# Patient Record
Sex: MALE | Race: WHITE | Employment: OTHER | URBAN - METROPOLITAN AREA
[De-identification: names, ages, dates, MRNs, and addresses within clinical notes are randomized per-mention and may not be internally consistent; named-entity substitution may affect disease eponyms.]

---

## 2018-05-13 ENCOUNTER — APPOINTMENT (OUTPATIENT)
Dept: GENERAL RADIOLOGY | Age: 83
DRG: 470 | End: 2018-05-13
Payer: MEDICARE

## 2018-05-13 ENCOUNTER — HOSPITAL ENCOUNTER (INPATIENT)
Age: 83
LOS: 3 days | Discharge: NURSING, REHAB OR LONG-TERM CARE | DRG: 470 | End: 2018-05-16
Attending: EMERGENCY MEDICINE | Admitting: INTERNAL MEDICINE
Payer: MEDICARE

## 2018-05-13 DIAGNOSIS — S72.002A CLOSED LEFT HIP FRACTURE, INITIAL ENCOUNTER (HCC): Primary | ICD-10-CM

## 2018-05-13 DIAGNOSIS — R52 PAIN: ICD-10-CM

## 2018-05-13 PROBLEM — E78.2 MIXED HYPERLIPIDEMIA: Chronic | Status: ACTIVE | Noted: 2018-05-13

## 2018-05-13 PROBLEM — J44.9 COPD (CHRONIC OBSTRUCTIVE PULMONARY DISEASE) (HCC): Chronic | Status: ACTIVE | Noted: 2018-05-13

## 2018-05-13 PROBLEM — I10 HTN (HYPERTENSION), BENIGN: Chronic | Status: ACTIVE | Noted: 2018-05-13

## 2018-05-13 LAB
ABO/RH: NORMAL
ANION GAP SERPL CALCULATED.3IONS-SCNC: 11 MMOL/L (ref 7–16)
ANTIBODY SCREEN: NORMAL
APTT: 32.7 SEC (ref 24.5–35.1)
BASOPHILS ABSOLUTE: 0.05 E9/L (ref 0–0.2)
BASOPHILS RELATIVE PERCENT: 0.5 % (ref 0–2)
BUN BLDV-MCNC: 11 MG/DL (ref 8–23)
CALCIUM SERPL-MCNC: 9 MG/DL (ref 8.6–10.2)
CHLORIDE BLD-SCNC: 96 MMOL/L (ref 98–107)
CO2: 24 MMOL/L (ref 22–29)
CREAT SERPL-MCNC: 0.5 MG/DL (ref 0.7–1.2)
EKG ATRIAL RATE: 108 BPM
EKG P AXIS: 46 DEGREES
EKG P-R INTERVAL: 200 MS
EKG Q-T INTERVAL: 336 MS
EKG QRS DURATION: 128 MS
EKG QTC CALCULATION (BAZETT): 450 MS
EKG R AXIS: -45 DEGREES
EKG T AXIS: -32 DEGREES
EKG VENTRICULAR RATE: 108 BPM
EOSINOPHILS ABSOLUTE: 0.09 E9/L (ref 0.05–0.5)
EOSINOPHILS RELATIVE PERCENT: 0.9 % (ref 0–6)
GFR AFRICAN AMERICAN: >60
GFR NON-AFRICAN AMERICAN: >60 ML/MIN/1.73
GLUCOSE BLD-MCNC: 129 MG/DL (ref 74–109)
HCT VFR BLD CALC: 42.7 % (ref 37–54)
HEMOGLOBIN: 14.4 G/DL (ref 12.5–16.5)
IMMATURE GRANULOCYTES #: 0.04 E9/L
IMMATURE GRANULOCYTES %: 0.4 % (ref 0–5)
INR BLD: 1.3
LYMPHOCYTES ABSOLUTE: 0.95 E9/L (ref 1.5–4)
LYMPHOCYTES RELATIVE PERCENT: 9.8 % (ref 20–42)
MCH RBC QN AUTO: 35.1 PG (ref 26–35)
MCHC RBC AUTO-ENTMCNC: 33.7 % (ref 32–34.5)
MCV RBC AUTO: 104.1 FL (ref 80–99.9)
MONOCYTES ABSOLUTE: 1.01 E9/L (ref 0.1–0.95)
MONOCYTES RELATIVE PERCENT: 10.4 % (ref 2–12)
NEUTROPHILS ABSOLUTE: 7.54 E9/L (ref 1.8–7.3)
NEUTROPHILS RELATIVE PERCENT: 78 % (ref 43–80)
PDW BLD-RTO: 12.9 FL (ref 11.5–15)
PLATELET # BLD: 172 E9/L (ref 130–450)
PMV BLD AUTO: 10.3 FL (ref 7–12)
POTASSIUM SERPL-SCNC: 4.5 MMOL/L (ref 3.5–5)
PROTHROMBIN TIME: 14.5 SEC (ref 9.3–12.4)
RBC # BLD: 4.1 E12/L (ref 3.8–5.8)
SODIUM BLD-SCNC: 131 MMOL/L (ref 132–146)
WBC # BLD: 9.7 E9/L (ref 4.5–11.5)

## 2018-05-13 PROCEDURE — 93005 ELECTROCARDIOGRAM TRACING: CPT | Performed by: EMERGENCY MEDICINE

## 2018-05-13 PROCEDURE — 94640 AIRWAY INHALATION TREATMENT: CPT

## 2018-05-13 PROCEDURE — 85610 PROTHROMBIN TIME: CPT

## 2018-05-13 PROCEDURE — 73562 X-RAY EXAM OF KNEE 3: CPT

## 2018-05-13 PROCEDURE — 73610 X-RAY EXAM OF ANKLE: CPT

## 2018-05-13 PROCEDURE — 80048 BASIC METABOLIC PNL TOTAL CA: CPT

## 2018-05-13 PROCEDURE — 99285 EMERGENCY DEPT VISIT HI MDM: CPT

## 2018-05-13 PROCEDURE — 6370000000 HC RX 637 (ALT 250 FOR IP): Performed by: INTERNAL MEDICINE

## 2018-05-13 PROCEDURE — 1200000000 HC SEMI PRIVATE

## 2018-05-13 PROCEDURE — 71045 X-RAY EXAM CHEST 1 VIEW: CPT

## 2018-05-13 PROCEDURE — 36415 COLL VENOUS BLD VENIPUNCTURE: CPT

## 2018-05-13 PROCEDURE — 6360000002 HC RX W HCPCS: Performed by: INTERNAL MEDICINE

## 2018-05-13 PROCEDURE — 86901 BLOOD TYPING SEROLOGIC RH(D): CPT

## 2018-05-13 PROCEDURE — 6370000000 HC RX 637 (ALT 250 FOR IP): Performed by: EMERGENCY MEDICINE

## 2018-05-13 PROCEDURE — 73552 X-RAY EXAM OF FEMUR 2/>: CPT

## 2018-05-13 PROCEDURE — 73502 X-RAY EXAM HIP UNI 2-3 VIEWS: CPT

## 2018-05-13 PROCEDURE — 94664 DEMO&/EVAL PT USE INHALER: CPT

## 2018-05-13 PROCEDURE — 86900 BLOOD TYPING SEROLOGIC ABO: CPT

## 2018-05-13 PROCEDURE — 86850 RBC ANTIBODY SCREEN: CPT

## 2018-05-13 PROCEDURE — 2580000003 HC RX 258: Performed by: INTERNAL MEDICINE

## 2018-05-13 PROCEDURE — 85730 THROMBOPLASTIN TIME PARTIAL: CPT

## 2018-05-13 PROCEDURE — 85025 COMPLETE CBC W/AUTO DIFF WBC: CPT

## 2018-05-13 RX ORDER — SODIUM CHLORIDE 0.9 % (FLUSH) 0.9 %
10 SYRINGE (ML) INJECTION EVERY 12 HOURS SCHEDULED
Status: DISCONTINUED | OUTPATIENT
Start: 2018-05-13 | End: 2018-05-16 | Stop reason: HOSPADM

## 2018-05-13 RX ORDER — CLINDAMYCIN PHOSPHATE 900 MG/50ML
900 INJECTION INTRAVENOUS SEE ADMIN INSTRUCTIONS
Status: COMPLETED | OUTPATIENT
Start: 2018-05-13 | End: 2018-05-14

## 2018-05-13 RX ORDER — ESCITALOPRAM OXALATE 10 MG/1
10 TABLET ORAL DAILY
Status: DISCONTINUED | OUTPATIENT
Start: 2018-05-13 | End: 2018-05-16 | Stop reason: HOSPADM

## 2018-05-13 RX ORDER — ACETAMINOPHEN 325 MG/1
650 TABLET ORAL EVERY 4 HOURS PRN
Status: DISCONTINUED | OUTPATIENT
Start: 2018-05-13 | End: 2018-05-16 | Stop reason: HOSPADM

## 2018-05-13 RX ORDER — ACETAMINOPHEN 325 MG/1
650 TABLET ORAL EVERY 4 HOURS PRN
Status: DISCONTINUED | OUTPATIENT
Start: 2018-05-13 | End: 2018-05-13 | Stop reason: SDUPTHER

## 2018-05-13 RX ORDER — DOXYCYCLINE HYCLATE 100 MG/1
100 CAPSULE ORAL 2 TIMES DAILY
Status: DISCONTINUED | OUTPATIENT
Start: 2018-05-13 | End: 2018-05-16 | Stop reason: HOSPADM

## 2018-05-13 RX ORDER — ONDANSETRON 2 MG/ML
4 INJECTION INTRAMUSCULAR; INTRAVENOUS EVERY 6 HOURS PRN
Status: DISCONTINUED | OUTPATIENT
Start: 2018-05-13 | End: 2018-05-16 | Stop reason: HOSPADM

## 2018-05-13 RX ORDER — CLINDAMYCIN PHOSPHATE 900 MG/50ML
900 INJECTION INTRAVENOUS
Status: DISCONTINUED | OUTPATIENT
Start: 2018-05-13 | End: 2018-05-13 | Stop reason: CLARIF

## 2018-05-13 RX ORDER — LABETALOL HYDROCHLORIDE 5 MG/ML
10 INJECTION, SOLUTION INTRAVENOUS EVERY 4 HOURS PRN
Status: DISCONTINUED | OUTPATIENT
Start: 2018-05-13 | End: 2018-05-16 | Stop reason: HOSPADM

## 2018-05-13 RX ORDER — OXYCODONE HYDROCHLORIDE AND ACETAMINOPHEN 5; 325 MG/1; MG/1
1 TABLET ORAL EVERY 4 HOURS PRN
Status: DISCONTINUED | OUTPATIENT
Start: 2018-05-13 | End: 2018-05-16 | Stop reason: HOSPADM

## 2018-05-13 RX ORDER — FORMOTEROL FUMARATE 20 UG/2ML
20 SOLUTION RESPIRATORY (INHALATION) EVERY 12 HOURS
Status: DISCONTINUED | OUTPATIENT
Start: 2018-05-13 | End: 2018-05-16 | Stop reason: HOSPADM

## 2018-05-13 RX ORDER — BUDESONIDE 0.5 MG/2ML
500 INHALANT ORAL 2 TIMES DAILY
Status: DISCONTINUED | OUTPATIENT
Start: 2018-05-13 | End: 2018-05-16 | Stop reason: HOSPADM

## 2018-05-13 RX ORDER — IPRATROPIUM BROMIDE AND ALBUTEROL SULFATE 2.5; .5 MG/3ML; MG/3ML
1 SOLUTION RESPIRATORY (INHALATION) EVERY 4 HOURS PRN
Status: DISCONTINUED | OUTPATIENT
Start: 2018-05-13 | End: 2018-05-16 | Stop reason: HOSPADM

## 2018-05-13 RX ORDER — HYDROCODONE BITARTRATE AND ACETAMINOPHEN 5; 325 MG/1; MG/1
1 TABLET ORAL ONCE
Status: COMPLETED | OUTPATIENT
Start: 2018-05-13 | End: 2018-05-13

## 2018-05-13 RX ORDER — PREDNISONE 1 MG/1
2.5 TABLET ORAL DAILY
Status: DISCONTINUED | OUTPATIENT
Start: 2018-05-13 | End: 2018-05-16 | Stop reason: HOSPADM

## 2018-05-13 RX ORDER — SODIUM CHLORIDE 0.9 % (FLUSH) 0.9 %
10 SYRINGE (ML) INJECTION PRN
Status: DISCONTINUED | OUTPATIENT
Start: 2018-05-13 | End: 2018-05-16 | Stop reason: HOSPADM

## 2018-05-13 RX ADMIN — BUDESONIDE 500 MCG: 0.5 SUSPENSION RESPIRATORY (INHALATION) at 22:10

## 2018-05-13 RX ADMIN — DOXYCYCLINE HYCLATE 100 MG: 100 CAPSULE, GELATIN COATED ORAL at 21:38

## 2018-05-13 RX ADMIN — HYDROCODONE BITARTRATE AND ACETAMINOPHEN 1 TABLET: 5; 325 TABLET ORAL at 12:31

## 2018-05-13 RX ADMIN — FORMOTEROL FUMARATE DIHYDRATE 20 MCG: 20 SOLUTION RESPIRATORY (INHALATION) at 22:10

## 2018-05-13 RX ADMIN — Medication 10 ML: at 21:43

## 2018-05-13 ASSESSMENT — PAIN SCALES - GENERAL
PAINLEVEL_OUTOF10: 3
PAINLEVEL_OUTOF10: 0
PAINLEVEL_OUTOF10: 0

## 2018-05-14 ENCOUNTER — APPOINTMENT (OUTPATIENT)
Dept: GENERAL RADIOLOGY | Age: 83
DRG: 470 | End: 2018-05-14
Payer: MEDICARE

## 2018-05-14 ENCOUNTER — ANESTHESIA (OUTPATIENT)
Dept: OPERATING ROOM | Age: 83
DRG: 470 | End: 2018-05-14
Payer: MEDICARE

## 2018-05-14 ENCOUNTER — ANESTHESIA EVENT (OUTPATIENT)
Dept: OPERATING ROOM | Age: 83
DRG: 470 | End: 2018-05-14
Payer: MEDICARE

## 2018-05-14 VITALS
RESPIRATION RATE: 8 BRPM | SYSTOLIC BLOOD PRESSURE: 135 MMHG | TEMPERATURE: 98.8 F | DIASTOLIC BLOOD PRESSURE: 97 MMHG | OXYGEN SATURATION: 100 %

## 2018-05-14 PROBLEM — M86.652 CHRONIC OSTEOMYELITIS OF LEFT FEMUR (HCC): Chronic | Status: ACTIVE | Noted: 2018-05-14

## 2018-05-14 PROBLEM — S72.032A CLOSED DISPLACED MIDCERVICAL FRACTURE OF LEFT FEMUR (HCC): Status: ACTIVE | Noted: 2018-05-13

## 2018-05-14 PROBLEM — T84.9XXA COMPLICATION OF INTERNAL ORTHOPEDIC DEVICE, INITIAL ENCOUNTER (HCC): Status: ACTIVE | Noted: 2018-05-14

## 2018-05-14 LAB
ANION GAP SERPL CALCULATED.3IONS-SCNC: 11 MMOL/L (ref 7–16)
APTT: 31.4 SEC (ref 24.5–35.1)
BUN BLDV-MCNC: 15 MG/DL (ref 8–23)
CALCIUM SERPL-MCNC: 8.4 MG/DL (ref 8.6–10.2)
CHLORIDE BLD-SCNC: 97 MMOL/L (ref 98–107)
CO2: 22 MMOL/L (ref 22–29)
CREAT SERPL-MCNC: 0.6 MG/DL (ref 0.7–1.2)
GFR AFRICAN AMERICAN: >60
GFR NON-AFRICAN AMERICAN: >60 ML/MIN/1.73
GLUCOSE BLD-MCNC: 121 MG/DL (ref 74–109)
HCT VFR BLD CALC: 39.2 % (ref 37–54)
HEMOGLOBIN: 13.4 G/DL (ref 12.5–16.5)
INR BLD: 1.3
MCH RBC QN AUTO: 35.4 PG (ref 26–35)
MCHC RBC AUTO-ENTMCNC: 34.2 % (ref 32–34.5)
MCV RBC AUTO: 103.4 FL (ref 80–99.9)
PDW BLD-RTO: 13.2 FL (ref 11.5–15)
PLATELET # BLD: 153 E9/L (ref 130–450)
PMV BLD AUTO: 9.6 FL (ref 7–12)
POTASSIUM SERPL-SCNC: 4.3 MMOL/L (ref 3.5–5)
PROTHROMBIN TIME: 14.7 SEC (ref 9.3–12.4)
RBC # BLD: 3.79 E12/L (ref 3.8–5.8)
SODIUM BLD-SCNC: 130 MMOL/L (ref 132–146)
WBC # BLD: 7.5 E9/L (ref 4.5–11.5)

## 2018-05-14 PROCEDURE — 80048 BASIC METABOLIC PNL TOTAL CA: CPT

## 2018-05-14 PROCEDURE — 2500000003 HC RX 250 WO HCPCS: Performed by: NURSE ANESTHETIST, CERTIFIED REGISTERED

## 2018-05-14 PROCEDURE — 3209999900 FLUORO FOR SURGICAL PROCEDURES

## 2018-05-14 PROCEDURE — 2500000003 HC RX 250 WO HCPCS: Performed by: STUDENT IN AN ORGANIZED HEALTH CARE EDUCATION/TRAINING PROGRAM

## 2018-05-14 PROCEDURE — 6360000002 HC RX W HCPCS

## 2018-05-14 PROCEDURE — 2580000003 HC RX 258: Performed by: NURSE ANESTHETIST, CERTIFIED REGISTERED

## 2018-05-14 PROCEDURE — C1776 JOINT DEVICE (IMPLANTABLE): HCPCS | Performed by: ORTHOPAEDIC SURGERY

## 2018-05-14 PROCEDURE — 3600000005 HC SURGERY LEVEL 5 BASE: Performed by: ORTHOPAEDIC SURGERY

## 2018-05-14 PROCEDURE — 87077 CULTURE AEROBIC IDENTIFY: CPT

## 2018-05-14 PROCEDURE — 3700000000 HC ANESTHESIA ATTENDED CARE: Performed by: ORTHOPAEDIC SURGERY

## 2018-05-14 PROCEDURE — 87070 CULTURE OTHR SPECIMN AEROBIC: CPT

## 2018-05-14 PROCEDURE — 2500000003 HC RX 250 WO HCPCS

## 2018-05-14 PROCEDURE — 0SRS0JZ REPLACEMENT OF LEFT HIP JOINT, FEMORAL SURFACE WITH SYNTHETIC SUBSTITUTE, OPEN APPROACH: ICD-10-PCS | Performed by: ORTHOPAEDIC SURGERY

## 2018-05-14 PROCEDURE — 87015 SPECIMEN INFECT AGNT CONCNTJ: CPT

## 2018-05-14 PROCEDURE — 6360000002 HC RX W HCPCS: Performed by: NURSE ANESTHETIST, CERTIFIED REGISTERED

## 2018-05-14 PROCEDURE — 85610 PROTHROMBIN TIME: CPT

## 2018-05-14 PROCEDURE — 7100000001 HC PACU RECOVERY - ADDTL 15 MIN: Performed by: ORTHOPAEDIC SURGERY

## 2018-05-14 PROCEDURE — 94640 AIRWAY INHALATION TREATMENT: CPT

## 2018-05-14 PROCEDURE — 2580000003 HC RX 258: Performed by: INTERNAL MEDICINE

## 2018-05-14 PROCEDURE — 87075 CULTR BACTERIA EXCEPT BLOOD: CPT

## 2018-05-14 PROCEDURE — 0QBC0ZZ EXCISION OF LEFT LOWER FEMUR, OPEN APPROACH: ICD-10-PCS | Performed by: ORTHOPAEDIC SURGERY

## 2018-05-14 PROCEDURE — 85027 COMPLETE CBC AUTOMATED: CPT

## 2018-05-14 PROCEDURE — 94760 N-INVAS EAR/PLS OXIMETRY 1: CPT

## 2018-05-14 PROCEDURE — 6360000002 HC RX W HCPCS: Performed by: ORTHOPAEDIC SURGERY

## 2018-05-14 PROCEDURE — 85730 THROMBOPLASTIN TIME PARTIAL: CPT

## 2018-05-14 PROCEDURE — 88311 DECALCIFY TISSUE: CPT

## 2018-05-14 PROCEDURE — 7100000000 HC PACU RECOVERY - FIRST 15 MIN: Performed by: ORTHOPAEDIC SURGERY

## 2018-05-14 PROCEDURE — 2709999900 HC NON-CHARGEABLE SUPPLY: Performed by: ORTHOPAEDIC SURGERY

## 2018-05-14 PROCEDURE — 73502 X-RAY EXAM HIP UNI 2-3 VIEWS: CPT

## 2018-05-14 PROCEDURE — 3700000001 HC ADD 15 MINUTES (ANESTHESIA): Performed by: ORTHOPAEDIC SURGERY

## 2018-05-14 PROCEDURE — 87186 SC STD MICRODIL/AGAR DIL: CPT

## 2018-05-14 PROCEDURE — 36415 COLL VENOUS BLD VENIPUNCTURE: CPT

## 2018-05-14 PROCEDURE — 87116 MYCOBACTERIA CULTURE: CPT

## 2018-05-14 PROCEDURE — 1200000000 HC SEMI PRIVATE

## 2018-05-14 PROCEDURE — C1713 ANCHOR/SCREW BN/BN,TIS/BN: HCPCS | Performed by: ORTHOPAEDIC SURGERY

## 2018-05-14 PROCEDURE — 87206 SMEAR FLUORESCENT/ACID STAI: CPT

## 2018-05-14 PROCEDURE — 2700000000 HC OXYGEN THERAPY PER DAY

## 2018-05-14 PROCEDURE — 88304 TISSUE EXAM BY PATHOLOGIST: CPT

## 2018-05-14 PROCEDURE — 87102 FUNGUS ISOLATION CULTURE: CPT

## 2018-05-14 PROCEDURE — 3600000015 HC SURGERY LEVEL 5 ADDTL 15MIN: Performed by: ORTHOPAEDIC SURGERY

## 2018-05-14 PROCEDURE — 73552 X-RAY EXAM OF FEMUR 2/>: CPT

## 2018-05-14 PROCEDURE — 87205 SMEAR GRAM STAIN: CPT

## 2018-05-14 PROCEDURE — 0QP904Z REMOVAL OF INTERNAL FIXATION DEVICE FROM LEFT FEMORAL SHAFT, OPEN APPROACH: ICD-10-PCS | Performed by: ORTHOPAEDIC SURGERY

## 2018-05-14 DEVICE — DUP USE 125194 IMPL HIP FEM HEAD LFIT V40 26MM 3MM: Type: IMPLANTABLE DEVICE | Site: HIP | Status: FUNCTIONAL

## 2018-05-14 DEVICE — IMPLANTABLE DEVICE: Type: IMPLANTABLE DEVICE | Site: HIP | Status: FUNCTIONAL

## 2018-05-14 DEVICE — COMPONENT TOT HIP CAPPED STD FRAC: Type: IMPLANTABLE DEVICE | Site: HIP | Status: FUNCTIONAL

## 2018-05-14 DEVICE — RESORBABLE BEAD KIT - FAST CURE
Type: IMPLANTABLE DEVICE | Site: HIP | Status: FUNCTIONAL
Brand: OSTEOSET

## 2018-05-14 RX ORDER — PROPOFOL 10 MG/ML
INJECTION, EMULSION INTRAVENOUS PRN
Status: DISCONTINUED | OUTPATIENT
Start: 2018-05-14 | End: 2018-05-14 | Stop reason: SDUPTHER

## 2018-05-14 RX ORDER — NEOSTIGMINE METHYLSULFATE 1 MG/ML
INJECTION, SOLUTION INTRAVENOUS PRN
Status: DISCONTINUED | OUTPATIENT
Start: 2018-05-14 | End: 2018-05-14 | Stop reason: SDUPTHER

## 2018-05-14 RX ORDER — MORPHINE SULFATE 2 MG/ML
2 INJECTION, SOLUTION INTRAMUSCULAR; INTRAVENOUS EVERY 5 MIN PRN
Status: DISCONTINUED | OUTPATIENT
Start: 2018-05-14 | End: 2018-05-14 | Stop reason: HOSPADM

## 2018-05-14 RX ORDER — ASPIRIN 81 MG/1
81 TABLET ORAL DAILY
Qty: 25 TABLET | Refills: 0 | Status: ON HOLD | OUTPATIENT
Start: 2018-05-14 | End: 2020-04-22

## 2018-05-14 RX ORDER — OXYCODONE HYDROCHLORIDE AND ACETAMINOPHEN 5; 325 MG/1; MG/1
1 TABLET ORAL EVERY 6 HOURS PRN
Qty: 28 TABLET | Refills: 0 | Status: SHIPPED | OUTPATIENT
Start: 2018-05-14 | End: 2018-05-21

## 2018-05-14 RX ORDER — ASPIRIN 81 MG/1
81 TABLET ORAL DAILY
Status: DISCONTINUED | OUTPATIENT
Start: 2018-05-15 | End: 2018-05-16 | Stop reason: HOSPADM

## 2018-05-14 RX ORDER — DEXAMETHASONE SODIUM PHOSPHATE 4 MG/ML
INJECTION, SOLUTION INTRA-ARTICULAR; INTRALESIONAL; INTRAMUSCULAR; INTRAVENOUS; SOFT TISSUE PRN
Status: DISCONTINUED | OUTPATIENT
Start: 2018-05-14 | End: 2018-05-14 | Stop reason: SDUPTHER

## 2018-05-14 RX ORDER — ERGOCALCIFEROL 1.25 MG/1
50000 CAPSULE ORAL ONCE
Status: COMPLETED | OUTPATIENT
Start: 2018-05-14 | End: 2018-05-15

## 2018-05-14 RX ORDER — ONDANSETRON 2 MG/ML
INJECTION INTRAMUSCULAR; INTRAVENOUS PRN
Status: DISCONTINUED | OUTPATIENT
Start: 2018-05-14 | End: 2018-05-14 | Stop reason: SDUPTHER

## 2018-05-14 RX ORDER — FENTANYL CITRATE 50 UG/ML
INJECTION, SOLUTION INTRAMUSCULAR; INTRAVENOUS PRN
Status: DISCONTINUED | OUTPATIENT
Start: 2018-05-14 | End: 2018-05-14 | Stop reason: SDUPTHER

## 2018-05-14 RX ORDER — TOBRAMYCIN 1.2 G/30ML
1200 INJECTION, POWDER, LYOPHILIZED, FOR SOLUTION INTRAVENOUS ONCE
Status: COMPLETED | OUTPATIENT
Start: 2018-05-14 | End: 2018-05-14

## 2018-05-14 RX ORDER — MEPERIDINE HYDROCHLORIDE 50 MG/ML
12.5 INJECTION INTRAMUSCULAR; INTRAVENOUS; SUBCUTANEOUS EVERY 5 MIN PRN
Status: DISCONTINUED | OUTPATIENT
Start: 2018-05-14 | End: 2018-05-14 | Stop reason: HOSPADM

## 2018-05-14 RX ORDER — ROCURONIUM BROMIDE 10 MG/ML
INJECTION, SOLUTION INTRAVENOUS PRN
Status: DISCONTINUED | OUTPATIENT
Start: 2018-05-14 | End: 2018-05-14 | Stop reason: SDUPTHER

## 2018-05-14 RX ORDER — OXYCODONE HYDROCHLORIDE AND ACETAMINOPHEN 5; 325 MG/1; MG/1
0.5 TABLET ORAL EVERY 6 HOURS PRN
Status: DISCONTINUED | OUTPATIENT
Start: 2018-05-14 | End: 2018-05-16 | Stop reason: HOSPADM

## 2018-05-14 RX ORDER — MORPHINE SULFATE 4 MG/ML
4 INJECTION, SOLUTION INTRAMUSCULAR; INTRAVENOUS
Status: DISCONTINUED | OUTPATIENT
Start: 2018-05-14 | End: 2018-05-16 | Stop reason: HOSPADM

## 2018-05-14 RX ORDER — GLYCOPYRROLATE 0.2 MG/ML
INJECTION INTRAMUSCULAR; INTRAVENOUS PRN
Status: DISCONTINUED | OUTPATIENT
Start: 2018-05-14 | End: 2018-05-14 | Stop reason: SDUPTHER

## 2018-05-14 RX ORDER — LABETALOL HYDROCHLORIDE 5 MG/ML
5 INJECTION, SOLUTION INTRAVENOUS EVERY 10 MIN PRN
Status: DISCONTINUED | OUTPATIENT
Start: 2018-05-14 | End: 2018-05-14 | Stop reason: HOSPADM

## 2018-05-14 RX ORDER — HYDRALAZINE HYDROCHLORIDE 20 MG/ML
5 INJECTION INTRAMUSCULAR; INTRAVENOUS EVERY 10 MIN PRN
Status: DISCONTINUED | OUTPATIENT
Start: 2018-05-14 | End: 2018-05-14 | Stop reason: HOSPADM

## 2018-05-14 RX ORDER — CLINDAMYCIN PHOSPHATE 600 MG/50ML
600 INJECTION INTRAVENOUS ONCE
Status: COMPLETED | OUTPATIENT
Start: 2018-05-15 | End: 2018-05-15

## 2018-05-14 RX ORDER — SODIUM CHLORIDE 9 MG/ML
INJECTION, SOLUTION INTRAVENOUS CONTINUOUS PRN
Status: DISCONTINUED | OUTPATIENT
Start: 2018-05-14 | End: 2018-05-14 | Stop reason: SDUPTHER

## 2018-05-14 RX ORDER — PROMETHAZINE HYDROCHLORIDE 25 MG/ML
6.25 INJECTION, SOLUTION INTRAMUSCULAR; INTRAVENOUS
Status: DISCONTINUED | OUTPATIENT
Start: 2018-05-14 | End: 2018-05-14 | Stop reason: HOSPADM

## 2018-05-14 RX ORDER — LIDOCAINE HYDROCHLORIDE 20 MG/ML
INJECTION, SOLUTION INFILTRATION; PERINEURAL PRN
Status: DISCONTINUED | OUTPATIENT
Start: 2018-05-14 | End: 2018-05-14 | Stop reason: SDUPTHER

## 2018-05-14 RX ADMIN — PHENYLEPHRINE HYDROCHLORIDE 200 MCG: 10 INJECTION INTRAMUSCULAR; INTRAVENOUS; SUBCUTANEOUS at 18:05

## 2018-05-14 RX ADMIN — LIDOCAINE HYDROCHLORIDE 40 MG: 20 INJECTION, SOLUTION INFILTRATION; PERINEURAL at 18:06

## 2018-05-14 RX ADMIN — PHENYLEPHRINE HYDROCHLORIDE 200 MCG: 10 INJECTION INTRAMUSCULAR; INTRAVENOUS; SUBCUTANEOUS at 18:09

## 2018-05-14 RX ADMIN — ONDANSETRON 4 MG: 2 INJECTION INTRAMUSCULAR; INTRAVENOUS at 19:08

## 2018-05-14 RX ADMIN — FORMOTEROL FUMARATE DIHYDRATE 20 MCG: 20 SOLUTION RESPIRATORY (INHALATION) at 10:25

## 2018-05-14 RX ADMIN — ROCURONIUM BROMIDE 10 MG: 10 INJECTION, SOLUTION INTRAVENOUS at 20:02

## 2018-05-14 RX ADMIN — PHENYLEPHRINE HYDROCHLORIDE 100 MCG: 10 INJECTION INTRAMUSCULAR; INTRAVENOUS; SUBCUTANEOUS at 18:40

## 2018-05-14 RX ADMIN — PHENYLEPHRINE HYDROCHLORIDE 50 MCG/MIN: 10 INJECTION INTRAMUSCULAR; INTRAVENOUS; SUBCUTANEOUS at 18:49

## 2018-05-14 RX ADMIN — FENTANYL CITRATE 50 MCG: 50 INJECTION, SOLUTION INTRAMUSCULAR; INTRAVENOUS at 18:53

## 2018-05-14 RX ADMIN — FENTANYL CITRATE 50 MCG: 50 INJECTION, SOLUTION INTRAMUSCULAR; INTRAVENOUS at 19:36

## 2018-05-14 RX ADMIN — PROPOFOL 150 MG: 10 INJECTION, EMULSION INTRAVENOUS at 18:06

## 2018-05-14 RX ADMIN — Medication 3 MG: at 20:35

## 2018-05-14 RX ADMIN — ROCURONIUM BROMIDE 50 MG: 10 INJECTION, SOLUTION INTRAVENOUS at 18:06

## 2018-05-14 RX ADMIN — PHENYLEPHRINE HYDROCHLORIDE 200 MCG: 10 INJECTION INTRAMUSCULAR; INTRAVENOUS; SUBCUTANEOUS at 18:12

## 2018-05-14 RX ADMIN — GLYCOPYRROLATE 0.6 MG: 0.2 INJECTION, SOLUTION INTRAMUSCULAR; INTRAVENOUS at 20:35

## 2018-05-14 RX ADMIN — Medication 10 ML: at 09:00

## 2018-05-14 RX ADMIN — BUDESONIDE 500 MCG: 0.5 SUSPENSION RESPIRATORY (INHALATION) at 10:23

## 2018-05-14 RX ADMIN — SODIUM CHLORIDE: 9 INJECTION, SOLUTION INTRAVENOUS at 18:00

## 2018-05-14 RX ADMIN — FENTANYL CITRATE 50 MCG: 50 INJECTION, SOLUTION INTRAMUSCULAR; INTRAVENOUS at 18:06

## 2018-05-14 RX ADMIN — FENTANYL CITRATE 50 MCG: 50 INJECTION, SOLUTION INTRAMUSCULAR; INTRAVENOUS at 20:26

## 2018-05-14 RX ADMIN — DEXAMETHASONE SODIUM PHOSPHATE 10 MG: 4 INJECTION, SOLUTION INTRAMUSCULAR; INTRAVENOUS at 18:25

## 2018-05-14 RX ADMIN — PHENYLEPHRINE HYDROCHLORIDE 100 MCG: 10 INJECTION INTRAMUSCULAR; INTRAVENOUS; SUBCUTANEOUS at 18:35

## 2018-05-14 RX ADMIN — ROCURONIUM BROMIDE 10 MG: 10 INJECTION, SOLUTION INTRAVENOUS at 19:01

## 2018-05-14 RX ADMIN — PHENYLEPHRINE HYDROCHLORIDE 200 MCG: 10 INJECTION INTRAMUSCULAR; INTRAVENOUS; SUBCUTANEOUS at 18:15

## 2018-05-14 RX ADMIN — CLINDAMYCIN PHOSPHATE 900 MG: 18 INJECTION, SOLUTION INTRAVENOUS at 18:39

## 2018-05-14 ASSESSMENT — PULMONARY FUNCTION TESTS
PIF_VALUE: 16
PIF_VALUE: 25
PIF_VALUE: 24
PIF_VALUE: 19
PIF_VALUE: 26
PIF_VALUE: 19
PIF_VALUE: 25
PIF_VALUE: 17
PIF_VALUE: 18
PIF_VALUE: 23
PIF_VALUE: 17
PIF_VALUE: 23
PIF_VALUE: 18
PIF_VALUE: 17
PIF_VALUE: 25
PIF_VALUE: 25
PIF_VALUE: 20
PIF_VALUE: 25
PIF_VALUE: 19
PIF_VALUE: 25
PIF_VALUE: 25
PIF_VALUE: 20
PIF_VALUE: 19
PIF_VALUE: 20
PIF_VALUE: 19
PIF_VALUE: 25
PIF_VALUE: 23
PIF_VALUE: 25
PIF_VALUE: 20
PIF_VALUE: 19
PIF_VALUE: 23
PIF_VALUE: 25
PIF_VALUE: 19
PIF_VALUE: 25
PIF_VALUE: 22
PIF_VALUE: 26
PIF_VALUE: 25
PIF_VALUE: 23
PIF_VALUE: 25
PIF_VALUE: 24
PIF_VALUE: 19
PIF_VALUE: 18
PIF_VALUE: 1
PIF_VALUE: 23
PIF_VALUE: 18
PIF_VALUE: 20
PIF_VALUE: 25
PIF_VALUE: 23
PIF_VALUE: 0
PIF_VALUE: 17
PIF_VALUE: 19
PIF_VALUE: 23
PIF_VALUE: 25
PIF_VALUE: 19
PIF_VALUE: 23
PIF_VALUE: 17
PIF_VALUE: 19
PIF_VALUE: 18
PIF_VALUE: 25
PIF_VALUE: 23
PIF_VALUE: 19
PIF_VALUE: 26
PIF_VALUE: 14
PIF_VALUE: 20
PIF_VALUE: 24
PIF_VALUE: 20
PIF_VALUE: 25
PIF_VALUE: 25
PIF_VALUE: 1
PIF_VALUE: 20
PIF_VALUE: 22
PIF_VALUE: 23
PIF_VALUE: 25
PIF_VALUE: 25
PIF_VALUE: 14
PIF_VALUE: 19
PIF_VALUE: 3
PIF_VALUE: 26
PIF_VALUE: 19
PIF_VALUE: 4
PIF_VALUE: 20
PIF_VALUE: 25
PIF_VALUE: 20
PIF_VALUE: 20
PIF_VALUE: 2
PIF_VALUE: 24
PIF_VALUE: 25
PIF_VALUE: 16
PIF_VALUE: 2
PIF_VALUE: 20
PIF_VALUE: 19
PIF_VALUE: 25
PIF_VALUE: 25
PIF_VALUE: 20
PIF_VALUE: 24
PIF_VALUE: 20
PIF_VALUE: 26
PIF_VALUE: 19
PIF_VALUE: 25
PIF_VALUE: 20
PIF_VALUE: 26
PIF_VALUE: 26
PIF_VALUE: 16
PIF_VALUE: 19
PIF_VALUE: 18
PIF_VALUE: 19
PIF_VALUE: 17
PIF_VALUE: 24
PIF_VALUE: 23
PIF_VALUE: 25
PIF_VALUE: 25
PIF_VALUE: 14
PIF_VALUE: 25
PIF_VALUE: 19
PIF_VALUE: 20
PIF_VALUE: 24
PIF_VALUE: 25
PIF_VALUE: 26
PIF_VALUE: 16
PIF_VALUE: 26
PIF_VALUE: 20
PIF_VALUE: 26
PIF_VALUE: 26
PIF_VALUE: 3
PIF_VALUE: 25
PIF_VALUE: 20
PIF_VALUE: 26
PIF_VALUE: 24
PIF_VALUE: 20
PIF_VALUE: 14
PIF_VALUE: 20
PIF_VALUE: 20
PIF_VALUE: 23
PIF_VALUE: 25
PIF_VALUE: 26
PIF_VALUE: 25
PIF_VALUE: 20
PIF_VALUE: 24
PIF_VALUE: 27
PIF_VALUE: 25
PIF_VALUE: 20
PIF_VALUE: 25
PIF_VALUE: 25
PIF_VALUE: 20
PIF_VALUE: 4
PIF_VALUE: 14
PIF_VALUE: 0
PIF_VALUE: 16
PIF_VALUE: 24
PIF_VALUE: 23
PIF_VALUE: 24
PIF_VALUE: 18
PIF_VALUE: 18
PIF_VALUE: 25
PIF_VALUE: 25
PIF_VALUE: 20
PIF_VALUE: 19
PIF_VALUE: 25
PIF_VALUE: 25
PIF_VALUE: 20
PIF_VALUE: 19
PIF_VALUE: 25
PIF_VALUE: 20
PIF_VALUE: 26
PIF_VALUE: 23
PIF_VALUE: 26
PIF_VALUE: 25
PIF_VALUE: 20
PIF_VALUE: 24
PIF_VALUE: 20
PIF_VALUE: 20
PIF_VALUE: 16
PIF_VALUE: 20
PIF_VALUE: 20
PIF_VALUE: 24
PIF_VALUE: 20
PIF_VALUE: 22
PIF_VALUE: 3
PIF_VALUE: 21
PIF_VALUE: 16
PIF_VALUE: 25
PIF_VALUE: 20
PIF_VALUE: 19

## 2018-05-14 ASSESSMENT — PAIN SCALES - GENERAL
PAINLEVEL_OUTOF10: 0

## 2018-05-15 PROBLEM — M17.12 DEGENERATIVE JOINT DISEASE OF LEFT KNEE: Chronic | Status: ACTIVE | Noted: 2018-05-15

## 2018-05-15 LAB
ANION GAP SERPL CALCULATED.3IONS-SCNC: 11 MMOL/L (ref 7–16)
BASOPHILS ABSOLUTE: 0 E9/L (ref 0–0.2)
BASOPHILS RELATIVE PERCENT: 0 % (ref 0–2)
BUN BLDV-MCNC: 18 MG/DL (ref 8–23)
CALCIUM SERPL-MCNC: 8.2 MG/DL (ref 8.6–10.2)
CHLORIDE BLD-SCNC: 101 MMOL/L (ref 98–107)
CO2: 23 MMOL/L (ref 22–29)
CREAT SERPL-MCNC: 0.6 MG/DL (ref 0.7–1.2)
EOSINOPHILS ABSOLUTE: 0 E9/L (ref 0.05–0.5)
EOSINOPHILS RELATIVE PERCENT: 0 % (ref 0–6)
GFR AFRICAN AMERICAN: >60
GFR NON-AFRICAN AMERICAN: >60 ML/MIN/1.73
GLUCOSE BLD-MCNC: 163 MG/DL (ref 74–109)
HCT VFR BLD CALC: 35.4 % (ref 37–54)
HEMOGLOBIN: 11.9 G/DL (ref 12.5–16.5)
IMMATURE GRANULOCYTES #: 0.04 E9/L
IMMATURE GRANULOCYTES %: 0.5 % (ref 0–5)
LYMPHOCYTES ABSOLUTE: 0.53 E9/L (ref 1.5–4)
LYMPHOCYTES RELATIVE PERCENT: 7.2 % (ref 20–42)
MCH RBC QN AUTO: 35.4 PG (ref 26–35)
MCHC RBC AUTO-ENTMCNC: 33.6 % (ref 32–34.5)
MCV RBC AUTO: 105.4 FL (ref 80–99.9)
MONOCYTES ABSOLUTE: 0.95 E9/L (ref 0.1–0.95)
MONOCYTES RELATIVE PERCENT: 12.9 % (ref 2–12)
NEUTROPHILS ABSOLUTE: 5.87 E9/L (ref 1.8–7.3)
NEUTROPHILS RELATIVE PERCENT: 79.4 % (ref 43–80)
PDW BLD-RTO: 12.8 FL (ref 11.5–15)
PLATELET # BLD: 133 E9/L (ref 130–450)
PMV BLD AUTO: 10.7 FL (ref 7–12)
POTASSIUM SERPL-SCNC: 4.7 MMOL/L (ref 3.5–5)
RBC # BLD: 3.36 E12/L (ref 3.8–5.8)
RBC # BLD: NORMAL 10*6/UL
SODIUM BLD-SCNC: 135 MMOL/L (ref 132–146)
WBC # BLD: 7.4 E9/L (ref 4.5–11.5)

## 2018-05-15 PROCEDURE — 0HBRXZZ EXCISION OF TOE NAIL, EXTERNAL APPROACH: ICD-10-PCS | Performed by: PODIATRIST

## 2018-05-15 PROCEDURE — 6370000000 HC RX 637 (ALT 250 FOR IP): Performed by: INTERNAL MEDICINE

## 2018-05-15 PROCEDURE — 2700000000 HC OXYGEN THERAPY PER DAY

## 2018-05-15 PROCEDURE — 1200000000 HC SEMI PRIVATE

## 2018-05-15 PROCEDURE — 94640 AIRWAY INHALATION TREATMENT: CPT

## 2018-05-15 PROCEDURE — 97535 SELF CARE MNGMENT TRAINING: CPT

## 2018-05-15 PROCEDURE — G8987 SELF CARE CURRENT STATUS: HCPCS

## 2018-05-15 PROCEDURE — 97165 OT EVAL LOW COMPLEX 30 MIN: CPT

## 2018-05-15 PROCEDURE — 97530 THERAPEUTIC ACTIVITIES: CPT

## 2018-05-15 PROCEDURE — 85025 COMPLETE CBC W/AUTO DIFF WBC: CPT

## 2018-05-15 PROCEDURE — G8978 MOBILITY CURRENT STATUS: HCPCS

## 2018-05-15 PROCEDURE — 97162 PT EVAL MOD COMPLEX 30 MIN: CPT

## 2018-05-15 PROCEDURE — 6370000000 HC RX 637 (ALT 250 FOR IP): Performed by: ORTHOPAEDIC SURGERY

## 2018-05-15 PROCEDURE — 2500000003 HC RX 250 WO HCPCS: Performed by: ORTHOPAEDIC SURGERY

## 2018-05-15 PROCEDURE — G8988 SELF CARE GOAL STATUS: HCPCS

## 2018-05-15 PROCEDURE — 2580000003 HC RX 258: Performed by: INTERNAL MEDICINE

## 2018-05-15 PROCEDURE — 36415 COLL VENOUS BLD VENIPUNCTURE: CPT

## 2018-05-15 PROCEDURE — G8979 MOBILITY GOAL STATUS: HCPCS

## 2018-05-15 PROCEDURE — 80048 BASIC METABOLIC PNL TOTAL CA: CPT

## 2018-05-15 RX ADMIN — DOXYCYCLINE HYCLATE 100 MG: 100 CAPSULE, GELATIN COATED ORAL at 20:36

## 2018-05-15 RX ADMIN — ACETAMINOPHEN 650 MG: 325 TABLET, FILM COATED ORAL at 08:45

## 2018-05-15 RX ADMIN — Medication 10 ML: at 08:48

## 2018-05-15 RX ADMIN — ESCITALOPRAM 10 MG: 10 TABLET, FILM COATED ORAL at 10:00

## 2018-05-15 RX ADMIN — FORMOTEROL FUMARATE DIHYDRATE 20 MCG: 20 SOLUTION RESPIRATORY (INHALATION) at 10:08

## 2018-05-15 RX ADMIN — DOXYCYCLINE HYCLATE 100 MG: 100 CAPSULE, GELATIN COATED ORAL at 10:00

## 2018-05-15 RX ADMIN — BUDESONIDE 500 MCG: 0.5 SUSPENSION RESPIRATORY (INHALATION) at 10:08

## 2018-05-15 RX ADMIN — CLINDAMYCIN PHOSPHATE 600 MG: 12 INJECTION, SOLUTION INTRAVENOUS at 01:54

## 2018-05-15 RX ADMIN — ASPIRIN 81 MG: 81 TABLET ORAL at 08:46

## 2018-05-15 RX ADMIN — PREDNISONE 2.5 MG: 5 TABLET ORAL at 10:06

## 2018-05-15 RX ADMIN — Medication 10 ML: at 20:36

## 2018-05-15 RX ADMIN — ERGOCALCIFEROL 50000 UNITS: 1.25 CAPSULE, LIQUID FILLED ORAL at 01:54

## 2018-05-15 ASSESSMENT — PAIN SCALES - GENERAL
PAINLEVEL_OUTOF10: 1
PAINLEVEL_OUTOF10: 0
PAINLEVEL_OUTOF10: 0
PAINLEVEL_OUTOF10: 3

## 2018-05-16 VITALS
DIASTOLIC BLOOD PRESSURE: 81 MMHG | WEIGHT: 240 LBS | SYSTOLIC BLOOD PRESSURE: 126 MMHG | OXYGEN SATURATION: 92 % | HEIGHT: 75 IN | HEART RATE: 103 BPM | RESPIRATION RATE: 16 BRPM | BODY MASS INDEX: 29.84 KG/M2 | TEMPERATURE: 97.9 F

## 2018-05-16 LAB
ANION GAP SERPL CALCULATED.3IONS-SCNC: 12 MMOL/L (ref 7–16)
ANISOCYTOSIS: ABNORMAL
BASOPHILS ABSOLUTE: 0 E9/L (ref 0–0.2)
BASOPHILS RELATIVE PERCENT: 0.1 % (ref 0–2)
BUN BLDV-MCNC: 29 MG/DL (ref 8–23)
CALCIUM SERPL-MCNC: 8.7 MG/DL (ref 8.6–10.2)
CHLORIDE BLD-SCNC: 102 MMOL/L (ref 98–107)
CO2: 21 MMOL/L (ref 22–29)
CREAT SERPL-MCNC: 0.7 MG/DL (ref 0.7–1.2)
EOSINOPHILS ABSOLUTE: 0 E9/L (ref 0.05–0.5)
EOSINOPHILS RELATIVE PERCENT: 0.1 % (ref 0–6)
GFR AFRICAN AMERICAN: >60
GFR NON-AFRICAN AMERICAN: >60 ML/MIN/1.73
GLUCOSE BLD-MCNC: 152 MG/DL (ref 74–109)
HCT VFR BLD CALC: 34.3 % (ref 37–54)
HEMOGLOBIN: 10.9 G/DL (ref 12.5–16.5)
LYMPHOCYTES ABSOLUTE: 1.34 E9/L (ref 1.5–4)
LYMPHOCYTES RELATIVE PERCENT: 12.9 % (ref 20–42)
MCH RBC QN AUTO: 34.8 PG (ref 26–35)
MCHC RBC AUTO-ENTMCNC: 31.8 % (ref 32–34.5)
MCV RBC AUTO: 109.6 FL (ref 80–99.9)
MONOCYTES ABSOLUTE: 1.03 E9/L (ref 0.1–0.95)
MONOCYTES RELATIVE PERCENT: 9.5 % (ref 2–12)
NEUTROPHILS ABSOLUTE: 8.03 E9/L (ref 1.8–7.3)
NEUTROPHILS RELATIVE PERCENT: 77.6 % (ref 43–80)
NUCLEATED RED BLOOD CELLS: 0.9 /100 WBC
PDW BLD-RTO: 13.1 FL (ref 11.5–15)
PLATELET # BLD: 176 E9/L (ref 130–450)
PMV BLD AUTO: 10.1 FL (ref 7–12)
POLYCHROMASIA: ABNORMAL
POTASSIUM SERPL-SCNC: 4.5 MMOL/L (ref 3.5–5)
RBC # BLD: 3.13 E12/L (ref 3.8–5.8)
SODIUM BLD-SCNC: 135 MMOL/L (ref 132–146)
WBC # BLD: 10.3 E9/L (ref 4.5–11.5)

## 2018-05-16 PROCEDURE — 97530 THERAPEUTIC ACTIVITIES: CPT

## 2018-05-16 PROCEDURE — 6370000000 HC RX 637 (ALT 250 FOR IP): Performed by: INTERNAL MEDICINE

## 2018-05-16 PROCEDURE — 85025 COMPLETE CBC W/AUTO DIFF WBC: CPT

## 2018-05-16 PROCEDURE — 94640 AIRWAY INHALATION TREATMENT: CPT

## 2018-05-16 PROCEDURE — 80048 BASIC METABOLIC PNL TOTAL CA: CPT

## 2018-05-16 PROCEDURE — 97535 SELF CARE MNGMENT TRAINING: CPT

## 2018-05-16 PROCEDURE — 36415 COLL VENOUS BLD VENIPUNCTURE: CPT

## 2018-05-16 PROCEDURE — 6370000000 HC RX 637 (ALT 250 FOR IP): Performed by: ORTHOPAEDIC SURGERY

## 2018-05-16 PROCEDURE — 2580000003 HC RX 258: Performed by: INTERNAL MEDICINE

## 2018-05-16 RX ORDER — ASPIRIN 81 MG/1
TABLET, CHEWABLE ORAL
Status: DISCONTINUED
Start: 2018-05-16 | End: 2018-05-16 | Stop reason: HOSPADM

## 2018-05-16 RX ADMIN — DOXYCYCLINE HYCLATE 100 MG: 100 CAPSULE, GELATIN COATED ORAL at 08:15

## 2018-05-16 RX ADMIN — Medication 10 ML: at 08:15

## 2018-05-16 RX ADMIN — ESCITALOPRAM 10 MG: 10 TABLET, FILM COATED ORAL at 08:16

## 2018-05-16 RX ADMIN — FORMOTEROL FUMARATE DIHYDRATE 20 MCG: 20 SOLUTION RESPIRATORY (INHALATION) at 08:40

## 2018-05-16 RX ADMIN — BUDESONIDE 500 MCG: 0.5 SUSPENSION RESPIRATORY (INHALATION) at 08:40

## 2018-05-16 RX ADMIN — ASPIRIN 81 MG: 81 TABLET ORAL at 17:08

## 2018-05-16 RX ADMIN — PREDNISONE 2.5 MG: 5 TABLET ORAL at 08:15

## 2018-05-16 ASSESSMENT — PAIN SCALES - GENERAL
PAINLEVEL_OUTOF10: 0
PAINLEVEL_OUTOF10: 3
PAINLEVEL_OUTOF10: 0

## 2018-05-17 LAB
CULTURE SURGICAL: NORMAL
CULTURE SURGICAL: NORMAL
GRAM STAIN RESULT: NORMAL
GRAM STAIN RESULT: NORMAL

## 2018-05-19 LAB
ANAEROBIC CULTURE: ABNORMAL
ANAEROBIC CULTURE: ABNORMAL
ANAEROBIC CULTURE: NORMAL
ANAEROBIC CULTURE: NORMAL
CULTURE SURGICAL: NORMAL
GRAM STAIN RESULT: NORMAL
ORGANISM: ABNORMAL

## 2018-06-18 LAB
FUNGUS (MYCOLOGY) CULTURE: NORMAL
FUNGUS STAIN: NORMAL

## 2018-07-03 LAB
AFB CULTURE (MYCOBACTERIA): NORMAL
AFB SMEAR: NORMAL

## 2019-01-10 LAB
ALBUMIN SERPL-MCNC: NORMAL G/DL
ALP BLD-CCNC: NORMAL U/L
ALT SERPL-CCNC: NORMAL U/L
ANION GAP SERPL CALCULATED.3IONS-SCNC: NORMAL MMOL/L
AST SERPL-CCNC: NORMAL U/L
AVERAGE GLUCOSE: NORMAL
BILIRUB SERPL-MCNC: NORMAL MG/DL
BUN BLDV-MCNC: NORMAL MG/DL
CALCIUM SERPL-MCNC: NORMAL MG/DL
CHLORIDE BLD-SCNC: NORMAL MMOL/L
CHOLESTEROL, TOTAL: NORMAL
CHOLESTEROL/HDL RATIO: NORMAL
CO2: NORMAL
CREAT SERPL-MCNC: NORMAL MG/DL
GFR CALCULATED: NORMAL
GLUCOSE BLD-MCNC: NORMAL MG/DL
HBA1C MFR BLD: 5 %
HDLC SERPL-MCNC: NORMAL MG/DL
LDL CHOLESTEROL CALCULATED: NORMAL
POTASSIUM SERPL-SCNC: NORMAL MMOL/L
SODIUM BLD-SCNC: NORMAL MMOL/L
TOTAL PROTEIN: NORMAL
TRIGL SERPL-MCNC: NORMAL MG/DL
VLDLC SERPL CALC-MCNC: NORMAL MG/DL

## 2019-06-12 ENCOUNTER — TELEPHONE (OUTPATIENT)
Dept: PRIMARY CARE CLINIC | Age: 84
End: 2019-06-12

## 2019-06-12 NOTE — TELEPHONE ENCOUNTER
Patient notified and will schedule an appointment    Electronically signed by Dang Mckenzie MA on 6/12/2019 at 2:48 PM

## 2019-06-13 ENCOUNTER — OFFICE VISIT (OUTPATIENT)
Dept: PRIMARY CARE CLINIC | Age: 84
End: 2019-06-13
Payer: MEDICARE

## 2019-06-13 VITALS — WEIGHT: 207 LBS | BODY MASS INDEX: 25.87 KG/M2 | TEMPERATURE: 98.1 F

## 2019-06-13 DIAGNOSIS — E03.9 ACQUIRED HYPOTHYROIDISM: Primary | ICD-10-CM

## 2019-06-13 DIAGNOSIS — E55.9 VITAMIN D DEFICIENCY: ICD-10-CM

## 2019-06-13 DIAGNOSIS — F41.9 ANXIETY: ICD-10-CM

## 2019-06-13 DIAGNOSIS — M15.9 PRIMARY OSTEOARTHRITIS INVOLVING MULTIPLE JOINTS: ICD-10-CM

## 2019-06-13 DIAGNOSIS — J43.9 PULMONARY EMPHYSEMA, UNSPECIFIED EMPHYSEMA TYPE (HCC): ICD-10-CM

## 2019-06-13 DIAGNOSIS — M79.605 LEFT LEG PAIN: ICD-10-CM

## 2019-06-13 PROCEDURE — 99214 OFFICE O/P EST MOD 30 MIN: CPT | Performed by: FAMILY MEDICINE

## 2019-06-13 RX ORDER — ESCITALOPRAM OXALATE 20 MG/1
10 TABLET ORAL DAILY
Qty: 90 TABLET | Refills: 5 | Status: SHIPPED | OUTPATIENT
Start: 2019-06-13 | End: 2019-07-15 | Stop reason: SDUPTHER

## 2019-06-13 RX ORDER — LEVOTHYROXINE SODIUM 0.03 MG/1
25 TABLET ORAL DAILY
Qty: 90 TABLET | Refills: 5 | Status: SHIPPED | OUTPATIENT
Start: 2019-06-13 | End: 2019-07-15 | Stop reason: SDUPTHER

## 2019-06-13 RX ORDER — DOXYCYCLINE HYCLATE 100 MG/1
100 CAPSULE ORAL 2 TIMES DAILY
Qty: 180 CAPSULE | Refills: 5 | Status: SHIPPED | OUTPATIENT
Start: 2019-06-13 | End: 2019-07-15 | Stop reason: SDUPTHER

## 2019-06-13 RX ORDER — ERGOCALCIFEROL 1.25 MG/1
50000 CAPSULE ORAL WEEKLY
Qty: 12 CAPSULE | Refills: 5 | Status: SHIPPED | OUTPATIENT
Start: 2019-06-13 | End: 2019-07-15 | Stop reason: SDUPTHER

## 2019-06-13 RX ORDER — TRIAMTERENE AND HYDROCHLOROTHIAZIDE 37.5; 25 MG/1; MG/1
1 TABLET ORAL DAILY
Qty: 30 TABLET | Refills: 1 | Status: SHIPPED | OUTPATIENT
Start: 2019-06-13 | End: 2019-07-15 | Stop reason: SDUPTHER

## 2019-06-13 RX ORDER — PREDNISONE 1 MG/1
2.5 TABLET ORAL DAILY
Qty: 90 TABLET | Refills: 5 | Status: SHIPPED | OUTPATIENT
Start: 2019-06-13 | End: 2019-07-15 | Stop reason: SDUPTHER

## 2019-06-13 ASSESSMENT — ENCOUNTER SYMPTOMS
SHORTNESS OF BREATH: 0
BACK PAIN: 0
CHOKING: 0
EYES NEGATIVE: 1
ALLERGIC/IMMUNOLOGIC NEGATIVE: 1
RESPIRATORY NEGATIVE: 1
COUGH: 0
GASTROINTESTINAL NEGATIVE: 1
STRIDOR: 0

## 2019-06-13 NOTE — PROGRESS NOTES
19  Name: Mary Castillo    : 1935    Sex: male    Age: 80 y.o. Subjective:  Chief Complaint: Patient is here for swelling legs       Onset one week ago    No  Cp or sob    Here alone  Left leg  Swell more then right      No calf pain        Saw   orto and  Gave shot and todl to see me              Review of Systems   Constitutional: Negative. HENT: Negative. Eyes: Negative. Respiratory: Negative. Negative for cough, choking, shortness of breath and stridor. Cardiovascular: Positive for leg swelling. Negative for chest pain. Gastrointestinal: Negative. Endocrine: Negative. Genitourinary: Negative. Musculoskeletal: Positive for arthralgias, gait problem and joint swelling. Negative for back pain and myalgias. Skin: Negative. Allergic/Immunologic: Negative. Hematological: Negative. Psychiatric/Behavioral: Negative. Current Outpatient Medications:     doxycycline hyclate (VIBRAMYCIN) 100 MG capsule, Take 1 capsule by mouth 2 times daily indefinite, Disp: 180 capsule, Rfl: 5    levothyroxine (SYNTHROID) 25 MCG tablet, Take 1 tablet by mouth daily, Disp: 90 tablet, Rfl: 5    vitamin D (ERGOCALCIFEROL) 49927 units CAPS capsule, Take 1 capsule by mouth once a week, Disp: 12 capsule, Rfl: 5    escitalopram (LEXAPRO) 20 MG tablet, Take 0.5 tablets by mouth daily, Disp: 90 tablet, Rfl: 5    fluticasone-salmeterol (ADVAIR DISKUS) 250-50 MCG/DOSE AEPB, Inhale 1 puff into the lungs 2 times daily, Disp: 3 each, Rfl: 5    predniSONE (DELTASONE) 5 MG tablet, Take 0.5 tablets by mouth daily, Disp: 90 tablet, Rfl: 5    triamterene-hydrochlorothiazide (MAXZIDE-25) 37.5-25 MG per tablet, Take 1 tablet by mouth daily, Disp: 30 tablet, Rfl: 1    aspirin EC 81 MG EC tablet, Take 1 tablet by mouth daily for 25 days, Disp: 25 tablet, Rfl: 0    acetaminophen (TYLENOL) 500 MG tablet, Take 500 mg by mouth 3 times daily as needed.   , Disp: , Rfl:   Allergies   Allergen Reactions    Cephalexin     Sulfa Antibiotics      Social History     Socioeconomic History    Marital status:      Spouse name: Not on file    Number of children: Not on file    Years of education: Not on file    Highest education level: Not on file   Occupational History    Occupation: retired     Comment: self employed   Social Needs    Financial resource strain: Not on file    Food insecurity:     Worry: Not on file     Inability: Not on file   Highlight needs:     Medical: Not on file     Non-medical: Not on file   Tobacco Use    Smoking status: Former Smoker     Years: 20.00     Types: Cigars    Tobacco comment: quit smoking 40 years ago   Substance and Sexual Activity    Alcohol use: No     Alcohol/week: 0.0 oz    Drug use: No    Sexual activity: Not on file   Lifestyle    Physical activity:     Days per week: Not on file     Minutes per session: Not on file    Stress: Not on file   Relationships    Social connections:     Talks on phone: Not on file     Gets together: Not on file     Attends Episcopal service: Not on file     Active member of club or organization: Not on file     Attends meetings of clubs or organizations: Not on file     Relationship status: Not on file    Intimate partner violence:     Fear of current or ex partner: Not on file     Emotionally abused: Not on file     Physically abused: Not on file     Forced sexual activity: Not on file   Other Topics Concern    Not on file   Social History Narrative        COPD    TICS    COLON POLYP    HTN    LIPID    [place]    ANX/DEP    EARLY HYPOTHYROID    EARLY DIET DM    COLON 12-99    TMT 1-06    CA BLADDER---PICMARIALUISA----DR BRYANT    BPH    EGD 12-05 MATT    FRACTURE L FEMUR SHAFT 4-10    L KNEE OR 12-10 TO CLEAR OUT INFECTION-DR ECKERT--LIFELONG DOXY BID    [employer] EVERY DAY AND THREE [employer] BEERS WHEN HE GETS HOME TOLD TO ME BY    WIFE 12-13    PARALYSIS DIAPHRAGM WITH DR HUMMEL 9-15----THEN DR VILLALOBOS    MOTHER MCG/DOSE AEPB; Inhale 1 puff into the lungs 2 times daily    Left leg pain  -     doxycycline hyclate (VIBRAMYCIN) 100 MG capsule; Take 1 capsule by mouth 2 times daily indefinite  -     predniSONE (DELTASONE) 5 MG tablet; Take 0.5 tablets by mouth daily  -     US DUP UPPER EXTREMITY LEFT VENOUS; Future  -     triamterene-hydrochlorothiazide (MAXZIDE-25) 37.5-25 MG per tablet; Take 1 tablet by mouth daily        Comments: us left leg     Refill meds   Diet exer  Hm  Issues------elev leg   See me   oen week  Call here after us. A great deal of time spent reviewing medications, diet, exercise, social issues. Also reviewing the chart before entering the room with patient and finishing charting after leaving patient's room. More than half of that time was spent face to face with the patient in counseling and coordinating care. Follow Up: Return in about 1 week (around 6/20/2019).      Seen by:  Terrance Ceron, DO

## 2019-06-14 ENCOUNTER — HOSPITAL ENCOUNTER (OUTPATIENT)
Dept: ULTRASOUND IMAGING | Age: 84
Discharge: HOME | End: 2019-06-16
Payer: MEDICARE

## 2019-06-14 DIAGNOSIS — M79.605 LEFT LEG PAIN: ICD-10-CM

## 2019-06-14 PROCEDURE — 93971 EXTREMITY STUDY: CPT

## 2019-06-20 ENCOUNTER — OFFICE VISIT (OUTPATIENT)
Dept: PRIMARY CARE CLINIC | Age: 84
End: 2019-06-20
Payer: MEDICARE

## 2019-06-20 VITALS
TEMPERATURE: 98.3 F | WEIGHT: 202 LBS | SYSTOLIC BLOOD PRESSURE: 128 MMHG | HEIGHT: 75 IN | DIASTOLIC BLOOD PRESSURE: 83 MMHG | BODY MASS INDEX: 25.12 KG/M2

## 2019-06-20 DIAGNOSIS — I10 HTN (HYPERTENSION), BENIGN: Primary | Chronic | ICD-10-CM

## 2019-06-20 DIAGNOSIS — M86.652 CHRONIC OSTEOMYELITIS OF LEFT FEMUR (HCC): Chronic | ICD-10-CM

## 2019-06-20 DIAGNOSIS — M15.9 PRIMARY OSTEOARTHRITIS INVOLVING MULTIPLE JOINTS: ICD-10-CM

## 2019-06-20 PROCEDURE — 99213 OFFICE O/P EST LOW 20 MIN: CPT | Performed by: FAMILY MEDICINE

## 2019-06-20 ASSESSMENT — ENCOUNTER SYMPTOMS
BACK PAIN: 0
EYES NEGATIVE: 1
RESPIRATORY NEGATIVE: 1

## 2019-06-20 NOTE — PROGRESS NOTES
19  Name: Denzel Cormier    : 1935    Sex: male    Age: 80 y.o. Subjective:  Chief Complaint: Patient is here for ck left leg        Left leg resolved   Us noted    Wife here           On  maxzide          [place]          For  Fitting brace today for l knee with dr Cristina Sanford      Review of Systems   Eyes: Negative. Respiratory: Negative. Cardiovascular: Negative. Musculoskeletal: Positive for arthralgias. Negative for back pain and gait problem. Current Outpatient Medications:     doxycycline hyclate (VIBRAMYCIN) 100 MG capsule, Take 1 capsule by mouth 2 times daily indefinite, Disp: 180 capsule, Rfl: 5    levothyroxine (SYNTHROID) 25 MCG tablet, Take 1 tablet by mouth daily, Disp: 90 tablet, Rfl: 5    vitamin D (ERGOCALCIFEROL) 10669 units CAPS capsule, Take 1 capsule by mouth once a week, Disp: 12 capsule, Rfl: 5    escitalopram (LEXAPRO) 20 MG tablet, Take 0.5 tablets by mouth daily, Disp: 90 tablet, Rfl: 5    fluticasone-salmeterol (ADVAIR DISKUS) 250-50 MCG/DOSE AEPB, Inhale 1 puff into the lungs 2 times daily, Disp: 3 each, Rfl: 5    predniSONE (DELTASONE) 5 MG tablet, Take 0.5 tablets by mouth daily, Disp: 90 tablet, Rfl: 5    triamterene-hydrochlorothiazide (MAXZIDE-25) 37.5-25 MG per tablet, Take 1 tablet by mouth daily, Disp: 30 tablet, Rfl: 1    aspirin EC 81 MG EC tablet, Take 1 tablet by mouth daily for 25 days, Disp: 25 tablet, Rfl: 0    acetaminophen (TYLENOL) 500 MG tablet, Take 500 mg by mouth 3 times daily as needed.   , Disp: , Rfl:   Allergies   Allergen Reactions    Cephalexin     Sulfa Antibiotics      Social History     Socioeconomic History    Marital status:      Spouse name: Not on file    Number of children: Not on file    Years of education: Not on file    Highest education level: Not on file   Occupational History    Occupation: retired     Comment: self employed   Social Needs    Financial resource strain: Not on [place] insecurity:     Worry: Not on file     Inability: Not on file    Transportation needs:     Medical: Not on file     Non-medical: Not on file   Tobacco Use    Smoking status: Former Smoker     Years: 20.00     Types: Cigars    Smokeless tobacco: Never Used    Tobacco comment: quit smoking 40 years ago   Substance and Sexual Activity    Alcohol use: No     Alcohol/week: 0.0 oz    Drug use: No    Sexual activity: Not on file   Lifestyle    Physical activity:     Days per week: Not on file     Minutes per session: Not on file    Stress: Not on file   Relationships    Social connections:     Talks on phone: Not on file     Gets together: Not on file     Attends Caodaism service: Not on file     Active member of club or organization: Not on file     Attends meetings of clubs or organizations: Not on file     Relationship status: Not on file    Intimate partner violence:     Fear of current or ex partner: Not on file     Emotionally abused: Not on file     Physically abused: Not on file     Forced sexual activity: Not on file   Other Topics Concern    Not on file   Social History Narrative        COPD    TICS    COLON POLYP    HTN    LIPID    [place]    ANX/DEP    EARLY HYPOTHYROID    EARLY DIET DM    COLON     TMT -06    CA BLADDER---PICHETTE----DR BRYANT    BPH    EGD  MATT    FRACTURE L FEMUR SHAFT 4-10    L KNEE OR 12-10 TO CLEAR OUT INFECTION-DR ECKERT--LIFELONG DOXY BID    [employer] EVERY DAY AND THREE [employer] BEERS WHEN HE GETS HOME TOLD TO ME BY    WIFE 12    PARALYSIS DIAPHRAGM WITH DR HUMMEL 9-15----THEN DR VILLALOBOS    MOTHER  AT 89    DAD  AT 79 CAD    VIT D DEFIC 10-16    HYPOTHYROID 2-17    HAS 4 CA    PULM DR VELEZ    RHEUM DR NEWTON    ONE BEER AT [employer] DAILY AND TWO  [facility] -18 DR ECKERT----OLD HARDWARE OUT      Past Medical History:   Diagnosis Date    Anxiety     Asthma     Bladder cancer ([facility] [address]     Chronic sinusitis     Constipation, chronic     COPD     Degenerative joint disease of left knee 5/15/2018    Depression     Gastritis     hx of gastritis    Hyperlipidemia     Hypertension     Hypothyroidism     Iron deficiency anemia     Prediabetes      Family History   Problem Relation Age of Onset    Arthritis Mother     Heart Disease Father       Past Surgical History:   Procedure Laterality Date    COLONOSCOPY      FEMUR FRACTURE SURGERY  04/12/2010    IM nailing left femoral shaft    FRACTURE SURGERY      CA PARTIAL HIP REPLACEMENT Left 5/14/2018    HIP HEMIARTHROPLASTY LEFT, WITH  REMOVAL OF PREVIOUS HARDWARE, INSERTION OF MEDICATION DELIVERY SYSTEM (WITH INTRAOPERATIVE FLUOROSCOPY) performed by Kathleen Burroughs MD at [facility]      10/10      Vitals:    06/20/19 1114   BP: 128/83   Temp: 98.3 °F (36.8 °C)   Weight: 202 lb (91.6 kg)   Height: 6' 3\" (1.905 m)       Objective:    Physical Exam   Cardiovascular: Normal rate and regular rhythm. Pulmonary/Chest: Effort normal and breath sounds normal.   Musculoskeletal: He exhibits deformity (dec rom l  knee). Skin:   Ecchymosis   resovled   No  Calf pain       Diagnoses and all orders for this visit:    HTN (hypertension), benign    Chronic osteomyelitis of left femur (HCC)    Primary osteoarthritis involving multiple joints        Comments: stop maxzdie on eweek befor enext ov       Sx call  sisues     A great deal of time spent reviewing medications, diet, exercise, social issues. Also reviewing the chart before entering the room with patient and finishing charting after leaving patient's room. More than half of that time was spent face to face with the patient in counseling and coordinating care. Follow Up: Return for reg.      Seen by:  Catie Retana DO

## 2019-07-15 ENCOUNTER — OFFICE VISIT (OUTPATIENT)
Dept: PRIMARY CARE CLINIC | Age: 84
End: 2019-07-15
Payer: MEDICARE

## 2019-07-15 VITALS
DIASTOLIC BLOOD PRESSURE: 83 MMHG | HEIGHT: 75 IN | WEIGHT: 209 LBS | BODY MASS INDEX: 25.99 KG/M2 | TEMPERATURE: 98.2 F | SYSTOLIC BLOOD PRESSURE: 128 MMHG

## 2019-07-15 DIAGNOSIS — E03.9 ACQUIRED HYPOTHYROIDISM: ICD-10-CM

## 2019-07-15 DIAGNOSIS — F41.9 ANXIETY: ICD-10-CM

## 2019-07-15 DIAGNOSIS — E78.2 MIXED HYPERLIPIDEMIA: ICD-10-CM

## 2019-07-15 DIAGNOSIS — E11.9 DIET-CONTROLLED DIABETES MELLITUS (HCC): ICD-10-CM

## 2019-07-15 DIAGNOSIS — E55.9 VITAMIN D DEFICIENCY: ICD-10-CM

## 2019-07-15 DIAGNOSIS — J43.9 PULMONARY EMPHYSEMA, UNSPECIFIED EMPHYSEMA TYPE (HCC): ICD-10-CM

## 2019-07-15 DIAGNOSIS — M15.9 PRIMARY OSTEOARTHRITIS INVOLVING MULTIPLE JOINTS: Primary | ICD-10-CM

## 2019-07-15 DIAGNOSIS — M79.605 LEFT LEG PAIN: ICD-10-CM

## 2019-07-15 PROCEDURE — 99214 OFFICE O/P EST MOD 30 MIN: CPT | Performed by: FAMILY MEDICINE

## 2019-07-15 RX ORDER — ESCITALOPRAM OXALATE 20 MG/1
10 TABLET ORAL DAILY
Qty: 90 TABLET | Refills: 5 | Status: SHIPPED | OUTPATIENT
Start: 2019-07-15 | End: 2020-01-27 | Stop reason: SDUPTHER

## 2019-07-15 RX ORDER — PREDNISONE 1 MG/1
2.5 TABLET ORAL DAILY
Qty: 90 TABLET | Refills: 5 | Status: SHIPPED | OUTPATIENT
Start: 2019-07-15 | End: 2020-01-20

## 2019-07-15 RX ORDER — DOXYCYCLINE HYCLATE 100 MG/1
100 CAPSULE ORAL 2 TIMES DAILY
Qty: 180 CAPSULE | Refills: 5 | Status: SHIPPED | OUTPATIENT
Start: 2019-07-15 | End: 2020-01-20

## 2019-07-15 RX ORDER — LEVOTHYROXINE SODIUM 0.03 MG/1
25 TABLET ORAL DAILY
Qty: 90 TABLET | Refills: 5 | Status: SHIPPED | OUTPATIENT
Start: 2019-07-15 | End: 2020-01-27 | Stop reason: SDUPTHER

## 2019-07-15 RX ORDER — ERGOCALCIFEROL 1.25 MG/1
50000 CAPSULE ORAL WEEKLY
Qty: 12 CAPSULE | Refills: 5 | Status: SHIPPED | OUTPATIENT
Start: 2019-07-15 | End: 2020-01-27 | Stop reason: SDUPTHER

## 2019-07-15 RX ORDER — TRIAMTERENE AND HYDROCHLOROTHIAZIDE 37.5; 25 MG/1; MG/1
1 TABLET ORAL DAILY
Qty: 30 TABLET | Refills: 5 | Status: SHIPPED | OUTPATIENT
Start: 2019-07-15 | End: 2020-01-27

## 2019-07-15 ASSESSMENT — ENCOUNTER SYMPTOMS
RESPIRATORY NEGATIVE: 1
EYES NEGATIVE: 1
GASTROINTESTINAL NEGATIVE: 1
ALLERGIC/IMMUNOLOGIC NEGATIVE: 1

## 2019-07-17 ENCOUNTER — OFFICE VISIT (OUTPATIENT)
Dept: RHEUMATOLOGY | Age: 84
End: 2019-07-17
Payer: MEDICARE

## 2019-07-17 VITALS
HEIGHT: 75 IN | SYSTOLIC BLOOD PRESSURE: 134 MMHG | WEIGHT: 205.5 LBS | DIASTOLIC BLOOD PRESSURE: 82 MMHG | TEMPERATURE: 98.1 F | HEART RATE: 68 BPM | OXYGEN SATURATION: 97 % | BODY MASS INDEX: 25.55 KG/M2

## 2019-07-17 DIAGNOSIS — Z87.39 H/O CALCIUM PYROPHOSPHATE DEPOSITION DISEASE (CPPD): ICD-10-CM

## 2019-07-17 DIAGNOSIS — M15.9 PRIMARY OSTEOARTHRITIS INVOLVING MULTIPLE JOINTS: Primary | ICD-10-CM

## 2019-07-17 PROCEDURE — 99214 OFFICE O/P EST MOD 30 MIN: CPT | Performed by: INTERNAL MEDICINE

## 2019-07-17 ASSESSMENT — ENCOUNTER SYMPTOMS
EYE PAIN: 0
WHEEZING: 0
ABDOMINAL DISTENTION: 0
SINUS PRESSURE: 0
PHOTOPHOBIA: 0
TROUBLE SWALLOWING: 0
COLOR CHANGE: 0
EYE ITCHING: 0
SINUS PAIN: 0
EYE DISCHARGE: 0
COUGH: 0
ABDOMINAL PAIN: 0
CONSTIPATION: 0
EYE REDNESS: 0
DIARRHEA: 0
SORE THROAT: 0
SHORTNESS OF BREATH: 0
CHEST TIGHTNESS: 0
VOMITING: 0
BLOOD IN STOOL: 0

## 2020-01-01 ENCOUNTER — OFFICE VISIT (OUTPATIENT)
Dept: PRIMARY CARE CLINIC | Age: 85
End: 2020-01-01
Payer: MEDICARE

## 2020-01-01 ENCOUNTER — TELEPHONE (OUTPATIENT)
Dept: CARDIOLOGY | Age: 85
End: 2020-01-01

## 2020-01-01 ENCOUNTER — TELEPHONE (OUTPATIENT)
Dept: PRIMARY CARE CLINIC | Age: 85
End: 2020-01-01

## 2020-01-01 ENCOUNTER — HOSPITAL ENCOUNTER (OUTPATIENT)
Age: 85
Discharge: HOME | End: 2020-08-22
Payer: MEDICARE

## 2020-01-01 ENCOUNTER — HOSPITAL ENCOUNTER (OUTPATIENT)
Dept: CARDIOLOGY | Age: 85
Discharge: HOME | End: 2020-11-23
Payer: MEDICARE

## 2020-01-01 ENCOUNTER — OFFICE VISIT (OUTPATIENT)
Dept: CARDIOLOGY CLINIC | Age: 85
End: 2020-01-01
Payer: MEDICARE

## 2020-01-01 ENCOUNTER — HOSPITAL ENCOUNTER (OUTPATIENT)
Age: 85
Discharge: HOME | End: 2020-12-23
Payer: MEDICARE

## 2020-01-01 ENCOUNTER — NURSE ONLY (OUTPATIENT)
Dept: PRIMARY CARE CLINIC | Age: 85
End: 2020-01-01

## 2020-01-01 ENCOUNTER — TELEPHONE (OUTPATIENT)
Dept: ADMINISTRATIVE | Age: 85
End: 2020-01-01

## 2020-01-01 ENCOUNTER — VIRTUAL VISIT (OUTPATIENT)
Dept: PRIMARY CARE CLINIC | Age: 85
End: 2020-01-01
Payer: MEDICARE

## 2020-01-01 VITALS
TEMPERATURE: 98 F | WEIGHT: 185 LBS | BODY MASS INDEX: 23.12 KG/M2 | DIASTOLIC BLOOD PRESSURE: 78 MMHG | SYSTOLIC BLOOD PRESSURE: 127 MMHG

## 2020-01-01 VITALS
HEART RATE: 113 BPM | DIASTOLIC BLOOD PRESSURE: 82 MMHG | BODY MASS INDEX: 23 KG/M2 | SYSTOLIC BLOOD PRESSURE: 135 MMHG | RESPIRATION RATE: 18 BRPM | WEIGHT: 184 LBS | TEMPERATURE: 98.7 F | OXYGEN SATURATION: 84 %

## 2020-01-01 VITALS
RESPIRATION RATE: 18 BRPM | DIASTOLIC BLOOD PRESSURE: 62 MMHG | SYSTOLIC BLOOD PRESSURE: 106 MMHG | HEART RATE: 85 BPM | BODY MASS INDEX: 22.88 KG/M2 | HEIGHT: 75 IN | WEIGHT: 184 LBS

## 2020-01-01 VITALS
BODY MASS INDEX: 23 KG/M2 | HEART RATE: 73 BPM | TEMPERATURE: 98.2 F | OXYGEN SATURATION: 96 % | WEIGHT: 184 LBS | RESPIRATION RATE: 14 BRPM

## 2020-01-01 VITALS
SYSTOLIC BLOOD PRESSURE: 134 MMHG | DIASTOLIC BLOOD PRESSURE: 82 MMHG | TEMPERATURE: 98.1 F | WEIGHT: 186 LBS | BODY MASS INDEX: 23.25 KG/M2

## 2020-01-01 VITALS
WEIGHT: 200 LBS | SYSTOLIC BLOOD PRESSURE: 132 MMHG | TEMPERATURE: 99.2 F | DIASTOLIC BLOOD PRESSURE: 78 MMHG | BODY MASS INDEX: 25 KG/M2

## 2020-01-01 VITALS
WEIGHT: 188 LBS | DIASTOLIC BLOOD PRESSURE: 74 MMHG | SYSTOLIC BLOOD PRESSURE: 132 MMHG | BODY MASS INDEX: 23.5 KG/M2 | TEMPERATURE: 98.1 F

## 2020-01-01 DIAGNOSIS — U07.1 COVID-19: ICD-10-CM

## 2020-01-01 LAB
ALBUMIN SERPL-MCNC: 3.5 G/DL (ref 3.5–5.2)
ALP BLD-CCNC: 74 U/L (ref 40–129)
ALT SERPL-CCNC: 22 U/L (ref 0–40)
ANION GAP SERPL CALCULATED.3IONS-SCNC: 9 MMOL/L (ref 7–16)
AST SERPL-CCNC: 50 U/L (ref 0–39)
BASOPHILS ABSOLUTE: 0.05 E9/L (ref 0–0.2)
BASOPHILS RELATIVE PERCENT: 1.3 % (ref 0–2)
BILIRUB SERPL-MCNC: 1 MG/DL (ref 0–1.2)
BUN BLDV-MCNC: 17 MG/DL (ref 8–23)
CALCIUM SERPL-MCNC: 9.6 MG/DL (ref 8.6–10.2)
CHLORIDE BLD-SCNC: 105 MMOL/L (ref 98–107)
CHOLESTEROL, TOTAL: 171 MG/DL (ref 0–199)
CO2: 26 MMOL/L (ref 22–29)
CREAT SERPL-MCNC: 0.6 MG/DL (ref 0.7–1.2)
EOSINOPHILS ABSOLUTE: 0.22 E9/L (ref 0.05–0.5)
EOSINOPHILS RELATIVE PERCENT: 5.9 % (ref 0–6)
GFR AFRICAN AMERICAN: >60
GFR NON-AFRICAN AMERICAN: >60 ML/MIN/1.73
GLUCOSE BLD-MCNC: 94 MG/DL (ref 74–99)
HCT VFR BLD CALC: 37.8 % (ref 37–54)
HDLC SERPL-MCNC: 63 MG/DL
HEMOGLOBIN: 12.5 G/DL (ref 12.5–16.5)
IMMATURE GRANULOCYTES #: 0.03 E9/L
IMMATURE GRANULOCYTES %: 0.8 % (ref 0–5)
LDL CHOLESTEROL CALCULATED: 100 MG/DL (ref 0–99)
LYMPHOCYTES ABSOLUTE: 1.39 E9/L (ref 1.5–4)
LYMPHOCYTES RELATIVE PERCENT: 37.3 % (ref 20–42)
MCH RBC QN AUTO: 35.4 PG (ref 26–35)
MCHC RBC AUTO-ENTMCNC: 33.1 % (ref 32–34.5)
MCV RBC AUTO: 107.1 FL (ref 80–99.9)
MONOCYTES ABSOLUTE: 0.68 E9/L (ref 0.1–0.95)
MONOCYTES RELATIVE PERCENT: 18.2 % (ref 2–12)
NEUTROPHILS ABSOLUTE: 1.36 E9/L (ref 1.8–7.3)
NEUTROPHILS RELATIVE PERCENT: 36.5 % (ref 43–80)
PDW BLD-RTO: 14 FL (ref 11.5–15)
PLATELET # BLD: 190 E9/L (ref 130–450)
PMV BLD AUTO: 10.2 FL (ref 7–12)
POTASSIUM SERPL-SCNC: 4.1 MMOL/L (ref 3.5–5)
PROSTATE SPECIFIC ANTIGEN: 2.23 NG/ML (ref 0–4)
RBC # BLD: 3.53 E12/L (ref 3.8–5.8)
SARS-COV-2: NOT DETECTED
SARS-COV-2: NOT DETECTED
SODIUM BLD-SCNC: 140 MMOL/L (ref 132–146)
SOURCE: NORMAL
SOURCE: NORMAL
TOTAL PROTEIN: 7.4 G/DL (ref 6.4–8.3)
TRIGL SERPL-MCNC: 39 MG/DL (ref 0–149)
VITAMIN D 25-HYDROXY: 24 NG/ML (ref 30–100)
VLDLC SERPL CALC-MCNC: 8 MG/DL
WBC # BLD: 3.7 E9/L (ref 4.5–11.5)

## 2020-01-01 PROCEDURE — G8926 SPIRO NO PERF OR DOC: HCPCS | Performed by: FAMILY MEDICINE

## 2020-01-01 PROCEDURE — 1036F TOBACCO NON-USER: CPT | Performed by: FAMILY MEDICINE

## 2020-01-01 PROCEDURE — 3023F SPIROM DOC REV: CPT | Performed by: FAMILY MEDICINE

## 2020-01-01 PROCEDURE — 1123F ACP DISCUSS/DSCN MKR DOCD: CPT | Performed by: INTERNAL MEDICINE

## 2020-01-01 PROCEDURE — 4040F PNEUMOC VAC/ADMIN/RCVD: CPT | Performed by: INTERNAL MEDICINE

## 2020-01-01 PROCEDURE — 1123F ACP DISCUSS/DSCN MKR DOCD: CPT | Performed by: FAMILY MEDICINE

## 2020-01-01 PROCEDURE — G8420 CALC BMI NORM PARAMETERS: HCPCS | Performed by: FAMILY MEDICINE

## 2020-01-01 PROCEDURE — 80061 LIPID PANEL: CPT

## 2020-01-01 PROCEDURE — 82306 VITAMIN D 25 HYDROXY: CPT

## 2020-01-01 PROCEDURE — G8428 CUR MEDS NOT DOCUMENT: HCPCS | Performed by: FAMILY MEDICINE

## 2020-01-01 PROCEDURE — 4040F PNEUMOC VAC/ADMIN/RCVD: CPT | Performed by: FAMILY MEDICINE

## 2020-01-01 PROCEDURE — G0103 PSA SCREENING: HCPCS

## 2020-01-01 PROCEDURE — 99205 OFFICE O/P NEW HI 60 MIN: CPT | Performed by: INTERNAL MEDICINE

## 2020-01-01 PROCEDURE — 90732 PPSV23 VACC 2 YRS+ SUBQ/IM: CPT | Performed by: FAMILY MEDICINE

## 2020-01-01 PROCEDURE — G8420 CALC BMI NORM PARAMETERS: HCPCS | Performed by: INTERNAL MEDICINE

## 2020-01-01 PROCEDURE — G8427 DOCREV CUR MEDS BY ELIG CLIN: HCPCS | Performed by: FAMILY MEDICINE

## 2020-01-01 PROCEDURE — U0003 INFECTIOUS AGENT DETECTION BY NUCLEIC ACID (DNA OR RNA); SEVERE ACUTE RESPIRATORY SYNDROME CORONAVIRUS 2 (SARS-COV-2) (CORONAVIRUS DISEASE [COVID-19]), AMPLIFIED PROBE TECHNIQUE, MAKING USE OF HIGH THROUGHPUT TECHNOLOGIES AS DESCRIBED BY CMS-2020-01-R: HCPCS

## 2020-01-01 PROCEDURE — 99214 OFFICE O/P EST MOD 30 MIN: CPT | Performed by: FAMILY MEDICINE

## 2020-01-01 PROCEDURE — 96372 THER/PROPH/DIAG INJ SC/IM: CPT | Performed by: FAMILY MEDICINE

## 2020-01-01 PROCEDURE — G8484 FLU IMMUNIZE NO ADMIN: HCPCS | Performed by: FAMILY MEDICINE

## 2020-01-01 PROCEDURE — G0438 PPPS, INITIAL VISIT: HCPCS | Performed by: FAMILY MEDICINE

## 2020-01-01 PROCEDURE — G8417 CALC BMI ABV UP PARAM F/U: HCPCS | Performed by: FAMILY MEDICINE

## 2020-01-01 PROCEDURE — 85025 COMPLETE CBC W/AUTO DIFF WBC: CPT

## 2020-01-01 PROCEDURE — 99213 OFFICE O/P EST LOW 20 MIN: CPT | Performed by: FAMILY MEDICINE

## 2020-01-01 PROCEDURE — 93308 TTE F-UP OR LMTD: CPT

## 2020-01-01 PROCEDURE — G8427 DOCREV CUR MEDS BY ELIG CLIN: HCPCS | Performed by: INTERNAL MEDICINE

## 2020-01-01 PROCEDURE — 36415 COLL VENOUS BLD VENIPUNCTURE: CPT

## 2020-01-01 PROCEDURE — 99421 OL DIG E/M SVC 5-10 MIN: CPT | Performed by: FAMILY MEDICINE

## 2020-01-01 PROCEDURE — 80053 COMPREHEN METABOLIC PANEL: CPT

## 2020-01-01 PROCEDURE — G8484 FLU IMMUNIZE NO ADMIN: HCPCS | Performed by: INTERNAL MEDICINE

## 2020-01-01 PROCEDURE — G0009 ADMIN PNEUMOCOCCAL VACCINE: HCPCS | Performed by: FAMILY MEDICINE

## 2020-01-01 PROCEDURE — 1036F TOBACCO NON-USER: CPT | Performed by: INTERNAL MEDICINE

## 2020-01-01 PROCEDURE — 93000 ELECTROCARDIOGRAM COMPLETE: CPT | Performed by: INTERNAL MEDICINE

## 2020-01-01 RX ORDER — AZITHROMYCIN 250 MG/1
250 TABLET, FILM COATED ORAL SEE ADMIN INSTRUCTIONS
Qty: 6 TABLET | Refills: 0 | Status: SHIPPED | OUTPATIENT
Start: 2020-01-01 | End: 2020-01-01

## 2020-01-01 RX ORDER — PREDNISONE 10 MG/1
TABLET ORAL
Qty: 18 TABLET | Refills: 0 | Status: SHIPPED
Start: 2020-01-01 | End: 2021-01-01

## 2020-01-01 RX ORDER — CLINDAMYCIN HYDROCHLORIDE 300 MG/1
300 CAPSULE ORAL 2 TIMES DAILY
Qty: 14 CAPSULE | Refills: 0 | Status: SHIPPED | OUTPATIENT
Start: 2020-01-01 | End: 2020-01-01

## 2020-01-01 RX ORDER — CEFDINIR 300 MG/1
300 CAPSULE ORAL 2 TIMES DAILY
Qty: 20 CAPSULE | Refills: 0 | Status: SHIPPED | OUTPATIENT
Start: 2020-01-01 | End: 2020-01-01

## 2020-01-01 RX ORDER — DOXYCYCLINE HYCLATE 100 MG
100 TABLET ORAL 2 TIMES DAILY
Qty: 20 TABLET | Refills: 0 | Status: SHIPPED | OUTPATIENT
Start: 2020-01-01 | End: 2020-01-01

## 2020-01-01 RX ORDER — METHYLPREDNISOLONE 4 MG/1
TABLET ORAL
Qty: 1 KIT | Refills: 0 | Status: SHIPPED
Start: 2020-01-01 | End: 2021-01-01

## 2020-01-01 RX ORDER — ERGOCALCIFEROL 1.25 MG/1
CAPSULE ORAL
Qty: 24 CAPSULE | Refills: 5 | Status: SHIPPED | OUTPATIENT
Start: 2020-01-01

## 2020-01-01 RX ORDER — METHYLPREDNISOLONE ACETATE 40 MG/ML
40 INJECTION, SUSPENSION INTRA-ARTICULAR; INTRALESIONAL; INTRAMUSCULAR; SOFT TISSUE ONCE
Status: COMPLETED | OUTPATIENT
Start: 2020-01-01 | End: 2020-01-01

## 2020-01-01 RX ORDER — PREDNISONE 10 MG/1
TABLET ORAL
Qty: 18 TABLET | Refills: 0 | Status: SHIPPED
Start: 2020-01-01 | End: 2020-01-01

## 2020-01-01 RX ORDER — ALBUTEROL SULFATE 2.5 MG/3ML
2.5 SOLUTION RESPIRATORY (INHALATION) EVERY 6 HOURS PRN
Qty: 120 EACH | Refills: 3 | Status: SHIPPED
Start: 2020-01-01 | End: 2021-01-01 | Stop reason: SDUPTHER

## 2020-01-01 RX ORDER — DOXYCYCLINE HYCLATE 100 MG
100 TABLET ORAL 2 TIMES DAILY
Qty: 180 TABLET | Refills: 5
Start: 2020-01-01 | End: 2021-01-01 | Stop reason: SDUPTHER

## 2020-01-01 RX ADMIN — METHYLPREDNISOLONE ACETATE 40 MG: 40 INJECTION, SUSPENSION INTRA-ARTICULAR; INTRALESIONAL; INTRAMUSCULAR; SOFT TISSUE at 14:29

## 2020-01-01 ASSESSMENT — ENCOUNTER SYMPTOMS
BACK PAIN: 1
EYES NEGATIVE: 1
COUGH: 1
SHORTNESS OF BREATH: 1
SHORTNESS OF BREATH: 0
WHEEZING: 0
ALLERGIC/IMMUNOLOGIC NEGATIVE: 1
TROUBLE SWALLOWING: 0
BACK PAIN: 1
ALLERGIC/IMMUNOLOGIC NEGATIVE: 1
GASTROINTESTINAL NEGATIVE: 1
STRIDOR: 0
CHEST TIGHTNESS: 0
ALLERGIC/IMMUNOLOGIC NEGATIVE: 1
ALLERGIC/IMMUNOLOGIC NEGATIVE: 1
RESPIRATORY NEGATIVE: 1
GASTROINTESTINAL NEGATIVE: 1
COUGH: 1
EYES NEGATIVE: 1
COUGH: 1
GASTROINTESTINAL NEGATIVE: 1
GASTROINTESTINAL NEGATIVE: 1
STRIDOR: 0
EYES NEGATIVE: 1
SHORTNESS OF BREATH: 0
SHORTNESS OF BREATH: 1
APNEA: 0
EYES NEGATIVE: 1

## 2020-01-01 ASSESSMENT — PATIENT HEALTH QUESTIONNAIRE - PHQ9
SUM OF ALL RESPONSES TO PHQ QUESTIONS 1-9: 0
SUM OF ALL RESPONSES TO PHQ9 QUESTIONS 1 & 2: 0
SUM OF ALL RESPONSES TO PHQ QUESTIONS 1-9: 0
SUM OF ALL RESPONSES TO PHQ9 QUESTIONS 1 & 2: 0
1. LITTLE INTEREST OR PLEASURE IN DOING THINGS: 0
SUM OF ALL RESPONSES TO PHQ QUESTIONS 1-9: 0
SUM OF ALL RESPONSES TO PHQ QUESTIONS 1-9: 0
1. LITTLE INTEREST OR PLEASURE IN DOING THINGS: 0
2. FEELING DOWN, DEPRESSED OR HOPELESS: 0
SUM OF ALL RESPONSES TO PHQ QUESTIONS 1-9: 0
2. FEELING DOWN, DEPRESSED OR HOPELESS: 0
SUM OF ALL RESPONSES TO PHQ9 QUESTIONS 1 & 2: 0
SUM OF ALL RESPONSES TO PHQ9 QUESTIONS 1 & 2: 0
SUM OF ALL RESPONSES TO PHQ QUESTIONS 1-9: 0
SUM OF ALL RESPONSES TO PHQ QUESTIONS 1-9: 0
2. FEELING DOWN, DEPRESSED OR HOPELESS: 0
SUM OF ALL RESPONSES TO PHQ QUESTIONS 1-9: 0
SUM OF ALL RESPONSES TO PHQ QUESTIONS 1-9: 0
1. LITTLE INTEREST OR PLEASURE IN DOING THINGS: 0
2. FEELING DOWN, DEPRESSED OR HOPELESS: 0
SUM OF ALL RESPONSES TO PHQ QUESTIONS 1-9: 0
1. LITTLE INTEREST OR PLEASURE IN DOING THINGS: 0
2. FEELING DOWN, DEPRESSED OR HOPELESS: 0
SUM OF ALL RESPONSES TO PHQ9 QUESTIONS 1 & 2: 0
1. LITTLE INTEREST OR PLEASURE IN DOING THINGS: 0
SUM OF ALL RESPONSES TO PHQ QUESTIONS 1-9: 0

## 2020-01-13 ENCOUNTER — TELEPHONE (OUTPATIENT)
Dept: PRIMARY CARE CLINIC | Age: 85
End: 2020-01-13

## 2020-01-13 NOTE — TELEPHONE ENCOUNTER
Eugenia Jefferson a  at [place] wife is having surgery this week. Wife is concerned about caring for  while she is recovering. Wanting to know if you could order homecare for Ed.

## 2020-01-14 NOTE — TELEPHONE ENCOUNTER
Go ahead and arrange but I doubt insurance will cover.   He is a diagnosis of debilitation gait disturbance

## 2020-01-16 NOTE — TELEPHONE ENCOUNTER
Unable to get through to [facility].   Rings then goes to fast busy signal.

## 2020-01-20 ENCOUNTER — OFFICE VISIT (OUTPATIENT)
Dept: PRIMARY CARE CLINIC | Age: 85
End: 2020-01-20
Payer: MEDICARE

## 2020-01-20 ENCOUNTER — HOSPITAL ENCOUNTER (OUTPATIENT)
Age: 85
Discharge: HOME | End: 2020-01-22
Payer: MEDICARE

## 2020-01-20 VITALS
DIASTOLIC BLOOD PRESSURE: 68 MMHG | HEIGHT: 75 IN | TEMPERATURE: 97.8 F | WEIGHT: 170 LBS | BODY MASS INDEX: 21.14 KG/M2 | SYSTOLIC BLOOD PRESSURE: 108 MMHG

## 2020-01-20 PROBLEM — S72.032A CLOSED DISPLACED MIDCERVICAL FRACTURE OF LEFT FEMUR (HCC): Status: RESOLVED | Noted: 2018-05-13 | Resolved: 2020-01-20

## 2020-01-20 PROBLEM — E55.9 VITAMIN D DEFICIENCY: Chronic | Status: ACTIVE | Noted: 2019-06-13

## 2020-01-20 PROBLEM — M15.9 PRIMARY OSTEOARTHRITIS INVOLVING MULTIPLE JOINTS: Chronic | Status: ACTIVE | Noted: 2019-06-13

## 2020-01-20 PROBLEM — F41.9 ANXIETY: Chronic | Status: ACTIVE | Noted: 2019-06-13

## 2020-01-20 PROBLEM — R63.4 UNEXPLAINED WEIGHT LOSS: Status: ACTIVE | Noted: 2020-01-20

## 2020-01-20 PROBLEM — Z87.39 H/O CALCIUM PYROPHOSPHATE DEPOSITION DISEASE (CPPD): Chronic | Status: ACTIVE | Noted: 2019-07-17

## 2020-01-20 PROBLEM — J43.9 PULMONARY EMPHYSEMA (HCC): Chronic | Status: ACTIVE | Noted: 2019-06-13

## 2020-01-20 PROBLEM — J45.20 MILD INTERMITTENT ASTHMA WITHOUT COMPLICATION: Status: ACTIVE | Noted: 2020-01-20

## 2020-01-20 PROBLEM — E03.9 ACQUIRED HYPOTHYROIDISM: Chronic | Status: ACTIVE | Noted: 2019-06-13

## 2020-01-20 PROBLEM — R63.4 UNEXPLAINED WEIGHT LOSS: Chronic | Status: ACTIVE | Noted: 2020-01-20

## 2020-01-20 PROBLEM — M79.605 LEFT LEG PAIN: Status: RESOLVED | Noted: 2019-07-15 | Resolved: 2020-01-20

## 2020-01-20 PROBLEM — T84.9XXA COMPLICATION OF INTERNAL ORTHOPEDIC DEVICE, INITIAL ENCOUNTER (HCC): Status: RESOLVED | Noted: 2018-05-14 | Resolved: 2020-01-20

## 2020-01-20 LAB
ALBUMIN SERPL-MCNC: 3 G/DL (ref 3.5–5.2)
ALP BLD-CCNC: 110 U/L (ref 40–129)
ALT SERPL-CCNC: 20 U/L (ref 0–40)
ANION GAP SERPL CALCULATED.3IONS-SCNC: 14 MMOL/L (ref 7–16)
AST SERPL-CCNC: 75 U/L (ref 0–39)
BASOPHILS ABSOLUTE: 0.08 E9/L (ref 0–0.2)
BASOPHILS RELATIVE PERCENT: 2.2 % (ref 0–2)
BILIRUB SERPL-MCNC: 0.8 MG/DL (ref 0–1.2)
BUN BLDV-MCNC: 14 MG/DL (ref 8–23)
CALCIUM SERPL-MCNC: 9.2 MG/DL (ref 8.6–10.2)
CHLORIDE BLD-SCNC: 99 MMOL/L (ref 98–107)
CHOLESTEROL, TOTAL: 130 MG/DL (ref 0–199)
CO2: 23 MMOL/L (ref 22–29)
CREAT SERPL-MCNC: 0.5 MG/DL (ref 0.7–1.2)
EOSINOPHILS ABSOLUTE: 0.41 E9/L (ref 0.05–0.5)
EOSINOPHILS RELATIVE PERCENT: 11.4 % (ref 0–6)
GFR AFRICAN AMERICAN: >60
GFR NON-AFRICAN AMERICAN: >60 ML/MIN/1.73
GLUCOSE BLD-MCNC: 95 MG/DL (ref 74–99)
HBA1C MFR BLD: 5 % (ref 4–5.6)
HCT VFR BLD CALC: 39.7 % (ref 37–54)
HDLC SERPL-MCNC: 30 MG/DL
HEMOGLOBIN: 12.5 G/DL (ref 12.5–16.5)
IMMATURE GRANULOCYTES #: 0.01 E9/L
IMMATURE GRANULOCYTES %: 0.3 % (ref 0–5)
LDL CHOLESTEROL CALCULATED: 84 MG/DL (ref 0–99)
LYMPHOCYTES ABSOLUTE: 1.47 E9/L (ref 1.5–4)
LYMPHOCYTES RELATIVE PERCENT: 40.7 % (ref 20–42)
MCH RBC QN AUTO: 34.1 PG (ref 26–35)
MCHC RBC AUTO-ENTMCNC: 31.5 % (ref 32–34.5)
MCV RBC AUTO: 108.2 FL (ref 80–99.9)
MONOCYTES ABSOLUTE: 0.54 E9/L (ref 0.1–0.95)
MONOCYTES RELATIVE PERCENT: 15 % (ref 2–12)
NEUTROPHILS ABSOLUTE: 1.1 E9/L (ref 1.8–7.3)
NEUTROPHILS RELATIVE PERCENT: 30.4 % (ref 43–80)
PDW BLD-RTO: 12.9 FL (ref 11.5–15)
PLATELET # BLD: 178 E9/L (ref 130–450)
PMV BLD AUTO: 10.2 FL (ref 7–12)
POTASSIUM SERPL-SCNC: 4.1 MMOL/L (ref 3.5–5)
PROSTATE SPECIFIC ANTIGEN: 0.66 NG/ML (ref 0–4)
RBC # BLD: 3.67 E12/L (ref 3.8–5.8)
SODIUM BLD-SCNC: 136 MMOL/L (ref 132–146)
T4 TOTAL: 7.3 MCG/DL (ref 4.5–11.7)
TOTAL PROTEIN: 7.6 G/DL (ref 6.4–8.3)
TRIGL SERPL-MCNC: 81 MG/DL (ref 0–149)
TSH SERPL DL<=0.05 MIU/L-ACNC: 2.5 UIU/ML (ref 0.27–4.2)
VITAMIN D 25-HYDROXY: 26 NG/ML (ref 30–100)
VLDLC SERPL CALC-MCNC: 16 MG/DL
WBC # BLD: 3.6 E9/L (ref 4.5–11.5)

## 2020-01-20 PROCEDURE — 84443 ASSAY THYROID STIM HORMONE: CPT

## 2020-01-20 PROCEDURE — 4040F PNEUMOC VAC/ADMIN/RCVD: CPT | Performed by: FAMILY MEDICINE

## 2020-01-20 PROCEDURE — 85025 COMPLETE CBC W/AUTO DIFF WBC: CPT

## 2020-01-20 PROCEDURE — G8420 CALC BMI NORM PARAMETERS: HCPCS | Performed by: FAMILY MEDICINE

## 2020-01-20 PROCEDURE — 3023F SPIROM DOC REV: CPT | Performed by: FAMILY MEDICINE

## 2020-01-20 PROCEDURE — 36415 COLL VENOUS BLD VENIPUNCTURE: CPT

## 2020-01-20 PROCEDURE — 80061 LIPID PANEL: CPT

## 2020-01-20 PROCEDURE — 80053 COMPREHEN METABOLIC PANEL: CPT

## 2020-01-20 PROCEDURE — 1123F ACP DISCUSS/DSCN MKR DOCD: CPT | Performed by: FAMILY MEDICINE

## 2020-01-20 PROCEDURE — G8427 DOCREV CUR MEDS BY ELIG CLIN: HCPCS | Performed by: FAMILY MEDICINE

## 2020-01-20 PROCEDURE — 82306 VITAMIN D 25 HYDROXY: CPT

## 2020-01-20 PROCEDURE — G8484 FLU IMMUNIZE NO ADMIN: HCPCS | Performed by: FAMILY MEDICINE

## 2020-01-20 PROCEDURE — G8926 SPIRO NO PERF OR DOC: HCPCS | Performed by: FAMILY MEDICINE

## 2020-01-20 PROCEDURE — 99214 OFFICE O/P EST MOD 30 MIN: CPT | Performed by: FAMILY MEDICINE

## 2020-01-20 PROCEDURE — 84436 ASSAY OF TOTAL THYROXINE: CPT

## 2020-01-20 PROCEDURE — 1036F TOBACCO NON-USER: CPT | Performed by: FAMILY MEDICINE

## 2020-01-20 PROCEDURE — G0103 PSA SCREENING: HCPCS

## 2020-01-20 PROCEDURE — 83036 HEMOGLOBIN GLYCOSYLATED A1C: CPT

## 2020-01-20 RX ORDER — DOXYCYCLINE HYCLATE 100 MG/1
100 CAPSULE ORAL 2 TIMES DAILY
Qty: 180 CAPSULE | Refills: 5 | Status: SHIPPED | OUTPATIENT
Start: 2020-01-20 | End: 2020-01-27 | Stop reason: SDUPTHER

## 2020-01-20 ASSESSMENT — PATIENT HEALTH QUESTIONNAIRE - PHQ9
2. FEELING DOWN, DEPRESSED OR HOPELESS: 0
1. LITTLE INTEREST OR PLEASURE IN DOING THINGS: 0
SUM OF ALL RESPONSES TO PHQ QUESTIONS 1-9: 0
SUM OF ALL RESPONSES TO PHQ9 QUESTIONS 1 & 2: 0
SUM OF ALL RESPONSES TO PHQ QUESTIONS 1-9: 0

## 2020-01-20 ASSESSMENT — ENCOUNTER SYMPTOMS
ALLERGIC/IMMUNOLOGIC NEGATIVE: 1
EYES NEGATIVE: 1
GASTROINTESTINAL NEGATIVE: 1
RESPIRATORY NEGATIVE: 1
SHORTNESS OF BREATH: 0
CHEST TIGHTNESS: 0
BACK PAIN: 1

## 2020-01-20 NOTE — PROGRESS NOTES
[place]    MOTHER  AT 80    DAD  AT 79 CAD    VIT D DEFIC 10-16    HYPOTHYROID 2-17    HAS 4 CA    PULM DR VELEZ    RHEUM DR [place]    ONE BEER AT [employer] DAILY AND TWO AT HOME DAILY    FRAC LEFT FEMUR  DR ECKERT----OLD HARDWARE OUT      Past Medical History:   Diagnosis Date    Anxiety     Asthma     Bladder cancer ([facility] [address]     Chronic sinusitis     Closed displaced midcervical fracture of left femur ([address]     Complication of internal orthopedic device, initial encounter ([facility] [address] 2018    Hardware not a problem, just in the way of fixation of broken hip, must be removed to insert hemiarthroplasty    Constipation, chronic     COPD     Degenerative joint disease of left knee 5/15/2018    Depression     Gastritis     hx of gastritis    Hearing loss     Hyperlipidemia     Hypertension     Hypothyroidism     Iron deficiency anemia     Left leg pain 7/15/2019    Prediabetes     Type 2 diabetes mellitus without complication ([facility] [address]     Vitamin D deficiency      Family History   Problem Relation Age of Onset    Arthritis Mother     Cancer Mother         glaucoma    Heart Disease Father     Coronary Art Dis Father     Emphysema Father     Diabetes Other     Arthritis Son       Past Surgical History:   Procedure Laterality Date    COLONOSCOPY      FEMUR FRACTURE SURGERY  2010    IM nailing left femoral shaft    FRACTURE SURGERY      DE PARTIAL HIP REPLACEMENT Left 2018    HIP HEMIARTHROPLASTY LEFT, WITH  REMOVAL OF PREVIOUS HARDWARE, INSERTION OF MEDICATION DELIVERY SYSTEM (WITH INTRAOPERATIVE FLUOROSCOPY) performed by Maday Craig MD at [facility]      10/10      Vitals:    20 1233   BP: 108/68   Temp: 97.8 °F (36.6 °C)   Weight: 170 lb (77.1 kg)   Height: 6' 3\" (1.905 m)       Objective:    Physical Exam  Vitals signs reviewed. Constitutional:       Appearance: He is well-developed.    HENT:      Head: [facility] [place] or see any specialist.A great deal of time spent reviewing medications, diet, exercise, social issues. Also reviewing the chart before entering the room with patient and finishing charting after leaving patient's room. More than half of that time was spent face to face with the patient in counseling and coordinating care. Follow Up: Return in about 1 week (around 1/27/2020).      Seen by:  Diego Gonzales, DO

## 2020-01-27 ENCOUNTER — OFFICE VISIT (OUTPATIENT)
Dept: PRIMARY CARE CLINIC | Age: 85
End: 2020-01-27
Payer: MEDICARE

## 2020-01-27 VITALS
TEMPERATURE: 97.8 F | DIASTOLIC BLOOD PRESSURE: 78 MMHG | SYSTOLIC BLOOD PRESSURE: 126 MMHG | BODY MASS INDEX: 21.75 KG/M2 | WEIGHT: 174 LBS

## 2020-01-27 PROBLEM — R79.89 ELEVATED LIVER FUNCTION TESTS: Status: ACTIVE | Noted: 2020-01-27

## 2020-01-27 PROCEDURE — 1036F TOBACCO NON-USER: CPT | Performed by: FAMILY MEDICINE

## 2020-01-27 PROCEDURE — 3023F SPIROM DOC REV: CPT | Performed by: FAMILY MEDICINE

## 2020-01-27 PROCEDURE — 1123F ACP DISCUSS/DSCN MKR DOCD: CPT | Performed by: FAMILY MEDICINE

## 2020-01-27 PROCEDURE — 99214 OFFICE O/P EST MOD 30 MIN: CPT | Performed by: FAMILY MEDICINE

## 2020-01-27 PROCEDURE — G8484 FLU IMMUNIZE NO ADMIN: HCPCS | Performed by: FAMILY MEDICINE

## 2020-01-27 PROCEDURE — 4040F PNEUMOC VAC/ADMIN/RCVD: CPT | Performed by: FAMILY MEDICINE

## 2020-01-27 PROCEDURE — G8420 CALC BMI NORM PARAMETERS: HCPCS | Performed by: FAMILY MEDICINE

## 2020-01-27 PROCEDURE — G8427 DOCREV CUR MEDS BY ELIG CLIN: HCPCS | Performed by: FAMILY MEDICINE

## 2020-01-27 PROCEDURE — G8926 SPIRO NO PERF OR DOC: HCPCS | Performed by: FAMILY MEDICINE

## 2020-01-27 RX ORDER — DILTIAZEM HYDROCHLORIDE 60 MG/1
2 TABLET, FILM COATED ORAL 2 TIMES DAILY
Qty: 1 INHALER | Refills: 3 | Status: SHIPPED | OUTPATIENT
Start: 2020-01-27 | End: 2021-01-01

## 2020-01-27 RX ORDER — DOXYCYCLINE HYCLATE 100 MG/1
100 CAPSULE ORAL 2 TIMES DAILY
Qty: 180 CAPSULE | Refills: 5 | Status: SHIPPED | OUTPATIENT
Start: 2020-01-27 | End: 2020-03-11 | Stop reason: ALTCHOICE

## 2020-01-27 RX ORDER — ESCITALOPRAM OXALATE 10 MG/1
10 TABLET ORAL DAILY
Qty: 90 TABLET | Refills: 5 | Status: SHIPPED | OUTPATIENT
Start: 2020-01-27 | End: 2021-01-01 | Stop reason: SDUPTHER

## 2020-01-27 RX ORDER — ERGOCALCIFEROL 1.25 MG/1
50000 CAPSULE ORAL WEEKLY
Qty: 12 CAPSULE | Refills: 5 | Status: SHIPPED | OUTPATIENT
Start: 2020-01-27 | End: 2020-01-01 | Stop reason: SDUPTHER

## 2020-01-27 RX ORDER — PREDNISONE 2.5 MG
2.5 TABLET ORAL DAILY
Qty: 90 TABLET | Refills: 11 | Status: SHIPPED | OUTPATIENT
Start: 2020-01-27 | End: 2021-01-01

## 2020-01-27 RX ORDER — LEVOTHYROXINE SODIUM 0.03 MG/1
25 TABLET ORAL DAILY
Qty: 90 TABLET | Refills: 5 | Status: SHIPPED | OUTPATIENT
Start: 2020-01-27 | End: 2021-01-01 | Stop reason: SDUPTHER

## 2020-01-27 ASSESSMENT — ENCOUNTER SYMPTOMS
EYES NEGATIVE: 1
GASTROINTESTINAL NEGATIVE: 1
RESPIRATORY NEGATIVE: 1
ALLERGIC/IMMUNOLOGIC NEGATIVE: 1

## 2020-01-27 NOTE — PROGRESS NOTES
20  Name: Natalie Ruiz    : 1935    Sex: male    Age: 80 y.o. Subjective:  Chief Complaint: Patient is here for  On ewek  Ck  Re weight       eatng better  Up 4 lbs   Lab noed  LFT up  Sl higher then last year   Still heavy  etoh daily   In wheelchair     he is bathing better since I talked to him last.  He is eating better as well. Son is in the room today. Wife told me previous patient he is doing better since he last saw me. There is very minimal walking refused to see any other doctors. Review of Systems   Constitutional: Negative. HENT: Negative. Eyes: Negative. Respiratory: Negative. Cardiovascular: Negative. Gastrointestinal: Negative. Endocrine: Negative. Genitourinary: Negative. Musculoskeletal: Positive for arthralgias. Skin: Negative. Allergic/Immunologic: Negative. Neurological: Negative. Hematological: Negative. Psychiatric/Behavioral: Negative.           Current Outpatient Medications:     vitamin D (ERGOCALCIFEROL) 1.25 MG (13395 UT) CAPS capsule, Take 1 capsule by mouth once a week, Disp: 12 capsule, Rfl: 5    escitalopram (LEXAPRO) 10 MG tablet, Take 1 tablet by mouth daily, Disp: 90 tablet, Rfl: 5    levothyroxine (SYNTHROID) 25 MCG tablet, Take 1 tablet by mouth daily, Disp: 90 tablet, Rfl: 5    doxycycline hyclate (VIBRAMYCIN) 100 MG capsule, Take 1 capsule by mouth 2 times daily indefinite, Disp: 180 capsule, Rfl: 5    SYMBICORT 80-4.5 MCG/ACT AERO, Inhale 2 puffs into the lungs 2 times daily, Disp: 1 Inhaler, Rfl: 3    predniSONE (DELTASONE) 2.5 MG tablet, Take 1 tablet by mouth daily, Disp: 90 tablet, Rfl: 11    PROAIR  (90 Base) MCG/ACT inhaler, , Disp: , Rfl:     aspirin EC 81 MG EC tablet, Take 1 tablet by mouth daily for 25 days, Disp: 25 tablet, Rfl: 0  Allergies   Allergen Reactions    Cephalexin     Sulfa Antibiotics      Social History     Socioeconomic History    Marital status:      Spouse name: Not on file    Number of children: Not on file    Years of education: Not on file    Highest education level: Not on file   Occupational History    Occupation: retired     Comment: self employed   Social Needs    Financial resource strain: Not on file    Food insecurity:     Worry: Not on file     Inability: Not on file   [employer] needs:     Medical: Not on file     Non-medical: Not on file   Tobacco Use    Smoking status: Former Smoker     Years: 20.00     Types: Cigars    Smokeless tobacco: Never Used    Tobacco comment: quit smoking 40 years ago   Substance and Sexual Activity    Alcohol use: Not on file    Drug use: No    Sexual activity: Not Currently     Partners: Female   Lifestyle    Physical activity:     Days per week: Not on file     Minutes per session: Not on file    Stress: Not on file   Relationships    Social connections:     Talks on phone: Not on file     Gets together: Not on file     Attends Worship service: Not on file     Active member of club or organization: Not on file     Attends meetings of clubs or organizations: Not on file     Relationship status: Not on file    Intimate partner violence:     Fear of current or ex partner: Not on file     Emotionally abused: Not on file     Physically abused: Not on file     Forced sexual activity: Not on file   Other Topics Concern    Not on file   Social History Narrative        COPD    TICS    COLON POLYP    HTN    LIPID    [place]    ANX/DEP    EARLY HYPOTHYROID    EARLY DIET DM    COLON     TMT 1-06    CA BLADDER---ARLINE----DR BRYANT    BPH    EGD  MATT    FRACTURE L FEMUR SHAFT 4-10    L KNEE OR 12-10 TO CLEAR OUT INFECTION-DR ECKERT--LIFELONG DOXY BID    [employer] EVERY DAY AND THREE [employer] BEERS WHEN HE GETS HOME TOLD TO ME BY    WIFE 12    PARALYSIS DIAPHRAGM WITH DR HUMMEL 9-15----THEN DR VILLALOBOS    MOTHER  AT 89    DAD  AT 79 CAD    VIT D DEFIC 10-16    HYPOTHYROID 2-17    HAS 4 CA Grace Gonzalez(Resident) range of motion. Cardiovascular:      Rate and Rhythm: Normal rate and regular rhythm. Pulmonary:      Effort: Pulmonary effort is normal.      Breath sounds: Normal breath sounds. Abdominal:      Palpations: Abdomen is soft. Musculoskeletal:      Comments: Marked dec rom   Both knees   Skin:     General: Skin is warm. Neurological:      Mental Status: He is alert and oriented to person, place, and time. Psychiatric:         Behavior: Behavior normal.         Deyanira Moody was seen today for results. Diagnoses and all orders for this visit:    Vitamin D deficiency  -     vitamin D (ERGOCALCIFEROL) 1.25 MG (47726 UT) CAPS capsule; Take 1 capsule by mouth once a week    Anxiety  -     escitalopram (LEXAPRO) 10 MG tablet; Take 1 tablet by mouth daily    Acquired hypothyroidism  -     levothyroxine (SYNTHROID) 25 MCG tablet; Take 1 tablet by mouth daily    Unexplained weight loss  -     doxycycline hyclate (VIBRAMYCIN) 100 MG capsule; Take 1 capsule by mouth 2 times daily indefinite    COPD without exacerbation ([facility] [address]  -     SYMBICORT 80-4.5 MCG/ACT AERO; Inhale 2 puffs into the lungs 2 times daily    Elevated liver function tests    Other orders  -     predniSONE (DELTASONE) 2.5 MG tablet; Take 1 tablet by mouth daily        Comments: Discontinue all alcohol. Increase diet. Daily basis. Follow-up with pulmonary. Continue daily antibiotic. Low-fat low sugar diet. Thyroid medication without food. Continue Lexapro. And to weigh him every week and call if it goes down at all. A great deal of time spent reviewing medications, diet, exercise, social issues. Also reviewing the chart before entering the room with patient and finishing charting after leaving patient's room. More than half of that time was spent face to face with the patient in counseling and coordinating care. He refuses see any doctor or any other doctors    Follow Up: Return in about 4 months (around 5/27/2020) for lab  before.      Seen by:  Koko Haynes, DO

## 2020-03-11 ENCOUNTER — HOSPITAL ENCOUNTER (EMERGENCY)
Age: 85
Discharge: HOME | End: 2020-03-11
Attending: EMERGENCY MEDICINE
Payer: MEDICARE

## 2020-03-11 ENCOUNTER — OFFICE VISIT (OUTPATIENT)
Dept: PRIMARY CARE CLINIC | Age: 85
End: 2020-03-11
Payer: MEDICARE

## 2020-03-11 ENCOUNTER — APPOINTMENT (OUTPATIENT)
Dept: ULTRASOUND IMAGING | Age: 85
End: 2020-03-11
Payer: MEDICARE

## 2020-03-11 VITALS
DIASTOLIC BLOOD PRESSURE: 78 MMHG | SYSTOLIC BLOOD PRESSURE: 119 MMHG | HEIGHT: 75 IN | TEMPERATURE: 97.5 F | HEART RATE: 91 BPM | OXYGEN SATURATION: 96 % | RESPIRATION RATE: 18 BRPM | BODY MASS INDEX: 21.64 KG/M2 | WEIGHT: 174 LBS

## 2020-03-11 VITALS
HEIGHT: 75 IN | HEART RATE: 102 BPM | OXYGEN SATURATION: 96 % | RESPIRATION RATE: 16 BRPM | TEMPERATURE: 98.3 F | WEIGHT: 174 LBS | BODY MASS INDEX: 21.64 KG/M2

## 2020-03-11 LAB
ALBUMIN SERPL-MCNC: 3.3 G/DL (ref 3.5–5.2)
ALP BLD-CCNC: 101 U/L (ref 40–129)
ALT SERPL-CCNC: 17 U/L (ref 0–40)
ANION GAP SERPL CALCULATED.3IONS-SCNC: 10 MMOL/L (ref 7–16)
AST SERPL-CCNC: 47 U/L (ref 0–39)
BASOPHILS ABSOLUTE: 0.06 E9/L (ref 0–0.2)
BASOPHILS RELATIVE PERCENT: 1.3 % (ref 0–2)
BILIRUB SERPL-MCNC: 0.9 MG/DL (ref 0–1.2)
BUN BLDV-MCNC: 14 MG/DL (ref 8–23)
CALCIUM SERPL-MCNC: 9 MG/DL (ref 8.6–10.2)
CHLORIDE BLD-SCNC: 100 MMOL/L (ref 98–107)
CO2: 25 MMOL/L (ref 22–29)
CREAT SERPL-MCNC: 0.5 MG/DL (ref 0.7–1.2)
EOSINOPHILS ABSOLUTE: 0.38 E9/L (ref 0.05–0.5)
EOSINOPHILS RELATIVE PERCENT: 8.5 % (ref 0–6)
GFR AFRICAN AMERICAN: >60
GFR NON-AFRICAN AMERICAN: >60 ML/MIN/1.73
GLUCOSE BLD-MCNC: 99 MG/DL (ref 74–99)
HCT VFR BLD CALC: 35.7 % (ref 37–54)
HEMOGLOBIN: 11.8 G/DL (ref 12.5–16.5)
IMMATURE GRANULOCYTES #: 0.02 E9/L
IMMATURE GRANULOCYTES %: 0.4 % (ref 0–5)
LYMPHOCYTES ABSOLUTE: 1.54 E9/L (ref 1.5–4)
LYMPHOCYTES RELATIVE PERCENT: 34.5 % (ref 20–42)
MCH RBC QN AUTO: 34.8 PG (ref 26–35)
MCHC RBC AUTO-ENTMCNC: 33.1 % (ref 32–34.5)
MCV RBC AUTO: 105.3 FL (ref 80–99.9)
MONOCYTES ABSOLUTE: 0.68 E9/L (ref 0.1–0.95)
MONOCYTES RELATIVE PERCENT: 15.2 % (ref 2–12)
NEUTROPHILS ABSOLUTE: 1.78 E9/L (ref 1.8–7.3)
NEUTROPHILS RELATIVE PERCENT: 40.1 % (ref 43–80)
PDW BLD-RTO: 14.6 FL (ref 11.5–15)
PLATELET # BLD: 188 E9/L (ref 130–450)
PMV BLD AUTO: 9.7 FL (ref 7–12)
POTASSIUM SERPL-SCNC: 4.4 MMOL/L (ref 3.5–5)
RBC # BLD: 3.39 E12/L (ref 3.8–5.8)
SODIUM BLD-SCNC: 135 MMOL/L (ref 132–146)
TOTAL PROTEIN: 7.4 G/DL (ref 6.4–8.3)
WBC # BLD: 4.5 E9/L (ref 4.5–11.5)

## 2020-03-11 PROCEDURE — 93971 EXTREMITY STUDY: CPT

## 2020-03-11 PROCEDURE — 1036F TOBACCO NON-USER: CPT | Performed by: FAMILY MEDICINE

## 2020-03-11 PROCEDURE — G8420 CALC BMI NORM PARAMETERS: HCPCS | Performed by: FAMILY MEDICINE

## 2020-03-11 PROCEDURE — G8428 CUR MEDS NOT DOCUMENT: HCPCS | Performed by: FAMILY MEDICINE

## 2020-03-11 PROCEDURE — G8484 FLU IMMUNIZE NO ADMIN: HCPCS | Performed by: FAMILY MEDICINE

## 2020-03-11 PROCEDURE — 85025 COMPLETE CBC W/AUTO DIFF WBC: CPT

## 2020-03-11 PROCEDURE — 1123F ACP DISCUSS/DSCN MKR DOCD: CPT | Performed by: FAMILY MEDICINE

## 2020-03-11 PROCEDURE — 99283 EMERGENCY DEPT VISIT LOW MDM: CPT

## 2020-03-11 PROCEDURE — 99213 OFFICE O/P EST LOW 20 MIN: CPT | Performed by: FAMILY MEDICINE

## 2020-03-11 PROCEDURE — 80053 COMPREHEN METABOLIC PANEL: CPT

## 2020-03-11 PROCEDURE — 4040F PNEUMOC VAC/ADMIN/RCVD: CPT | Performed by: FAMILY MEDICINE

## 2020-03-11 RX ORDER — CLINDAMYCIN HYDROCHLORIDE 300 MG/1
300 CAPSULE ORAL 2 TIMES DAILY
Qty: 14 CAPSULE | Refills: 0 | Status: SHIPPED | OUTPATIENT
Start: 2020-03-11 | End: 2020-03-18

## 2020-03-11 ASSESSMENT — ENCOUNTER SYMPTOMS: RESPIRATORY NEGATIVE: 1

## 2020-03-11 ASSESSMENT — PATIENT HEALTH QUESTIONNAIRE - PHQ9
SUM OF ALL RESPONSES TO PHQ QUESTIONS 1-9: 0
1. LITTLE INTEREST OR PLEASURE IN DOING THINGS: 0
SUM OF ALL RESPONSES TO PHQ QUESTIONS 1-9: 0
SUM OF ALL RESPONSES TO PHQ9 QUESTIONS 1 & 2: 0
2. FEELING DOWN, DEPRESSED OR HOPELESS: 0

## 2020-03-11 NOTE — PROGRESS NOTES
Degenerative joint disease of left knee 5/15/2018    Depression     Gastritis     hx of gastritis    Hearing loss     Hyperlipidemia     Hypertension     Hypothyroidism     Iron deficiency anemia     Left leg pain 7/15/2019    Prediabetes     Type 2 diabetes mellitus without complication ([address]     Vitamin D deficiency      Family History   Problem Relation Age of Onset    Arthritis Mother     Cancer Mother         glaucoma    Heart Disease Father     Coronary Art Dis Father     Emphysema Father     Diabetes Other     Arthritis Son       Past Surgical History:   Procedure Laterality Date    COLONOSCOPY      FEMUR FRACTURE SURGERY  04/12/2010    IM nailing left femoral shaft    FRACTURE SURGERY      ND PARTIAL HIP REPLACEMENT Left 5/14/2018    HIP HEMIARTHROPLASTY LEFT, WITH  REMOVAL OF PREVIOUS HARDWARE, INSERTION OF MEDICATION DELIVERY SYSTEM (WITH INTRAOPERATIVE FLUOROSCOPY) performed by Aixa Harper MD at [facility] ENDOSCOPY      10/10      Vitals:    03/11/20 1553   Pulse: 102   Resp: 16   Temp: 98.3 °F (36.8 °C)   SpO2: 96%   Weight: 174 lb (78.9 kg)   Height: 6' 3\" (1.905 m)       Objective:    Physical Exam  Cardiovascular:      Rate and Rhythm: Normal rate and regular rhythm. Pulses: Normal pulses. Heart sounds: Normal heart sounds. Pulmonary:      Effort: Pulmonary effort is normal.      Breath sounds: Normal breath sounds. Musculoskeletal:      Comments: Right arm with swelling and erythema and pain with palp         Itzel Sparrow was seen today for rash and swelling. Diagnoses and all orders for this visit:    Cellulitis of right arm        Comments: to er now    Wife  Drove   She will gali son  A great deal of time spent reviewing medications, diet, exercise, social issues. Also reviewing the chart before entering the room with patient and finishing charting after leaving patient's room.  More than half of that time was spent face to face with the patient in counseling and coordinating care. Follow Up: Return to er now.      Seen by:  Ziggy Whalen DO

## 2020-03-11 NOTE — ED TRIAGE NOTES
FIRST PROVIDER CONTACT ASSESSMENT NOTE      Department of Emergency Medicine   Admit Date: 3/11/2020  5:00 PM    Chief Complaint: Other (RUE swollen x 1 week-sent by Dr Aracelis Boyle for US-pulses present)      History of Present Illness:    Gypsy Gutiérrez is a 80 y.o. male who presents to the ED for left arm swelling x 1 week.  PCP recommended ED evaluation to r/o DVT.         -----------------END OF FIRST PROVIDER CONTACT ASSESSMENT NOTE--------------  Electronically signed by OG Garcia   DD: 3/11/20

## 2020-03-12 NOTE — ED PROVIDER NOTES
denies any pain. ROS    Pertinent positives and negatives are stated within HPI, all other systems reviewed and are negative. Past Surgical History:   Procedure Laterality Date    COLONOSCOPY      FEMUR FRACTURE SURGERY  04/12/2010    IM nailing left femoral shaft    FRACTURE SURGERY      MI PARTIAL HIP REPLACEMENT Left 5/14/2018    HIP HEMIARTHROPLASTY LEFT, WITH  REMOVAL OF PREVIOUS HARDWARE, INSERTION OF MEDICATION DELIVERY SYSTEM (WITH INTRAOPERATIVE FLUOROSCOPY) performed by Aixa Harper MD at [facility]      10/10   Social History:  reports that he has quit smoking. His smoking use included cigars. He quit after 20.00 years of use. He has never used smokeless tobacco. He reports that he does not use drugs. Family History: family history includes Arthritis in his mother and son; Cancer in his mother; Coronary Art Dis in his father; Diabetes in an other family member; Emphysema in his father; Heart Disease in his father. Allergies: Cephalexin and Sulfa antibiotics    Physical Exam           ED Triage Vitals [03/11/20 1704]   BP Temp Temp Source Pulse Resp SpO2 Height Weight   119/78 97.5 °F (36.4 °C) Oral 91 18 96 % 6' 3\" (1.905 m) 174 lb (78.9 kg)     Oxygen Saturation Interpretation: Normal.    Constitutional:  Alert, development consistent with age. HEENT:  NC/NT. Airway patent. Eyes:  No discharge. Ears:  External ears without lesions. Mouth:  Mucous membranes moist without lesions, tongue and gums normal.  Throat:  Airway patient. Neck:  Supple. No lymphadenopathy. Respiratory:  Clear to auscultation and breath sounds equal.  CV:  Regular rate and rhythm. Extremity: Pitting edema of the right upper extremity. Radial pulses equal and bounding bilaterally. Capillary refill difficult to distinguish secondary to onychomycosis.   Integument:  Skin turgor: Normal.              Hyperpigmented rash of the bilateral forearms with multiple excoriations capsule by mouth 2 times daily for 7 days     Electronically signed by Stephanie Granados PA-C   DD: 3/11/20  **This report was transcribed using voice recognition software. Every effort was made to ensure accuracy; however, inadvertent computerized transcription errors may be present.   END OF ED PROVIDER NOTE       Stephanie Granados PA-C  03/11/20 2027

## 2020-03-16 ENCOUNTER — TELEPHONE (OUTPATIENT)
Dept: PRIMARY CARE CLINIC | Age: 85
End: 2020-03-16

## 2020-03-16 NOTE — TELEPHONE ENCOUNTER
Per pt, in er for cellulitis. Told to call and schedule f/up. Said it's doing better and almost entirely gone. Does not want to come in with the COVID-19 crisis.   Will keep a close watch on it and call if he needs to come back in to see PCP

## 2020-03-18 ENCOUNTER — TELEPHONE (OUTPATIENT)
Dept: PRIMARY CARE CLINIC | Age: 85
End: 2020-03-18

## 2020-04-22 ENCOUNTER — APPOINTMENT (OUTPATIENT)
Dept: GENERAL RADIOLOGY | Age: 85
DRG: 640 | End: 2020-04-22
Payer: MEDICARE

## 2020-04-22 ENCOUNTER — APPOINTMENT (OUTPATIENT)
Dept: CT IMAGING | Age: 85
DRG: 640 | End: 2020-04-22
Payer: MEDICARE

## 2020-04-22 ENCOUNTER — HOSPITAL ENCOUNTER (INPATIENT)
Age: 85
LOS: 2 days | Discharge: NURSING, REHAB OR LONG-TERM CARE | DRG: 640 | End: 2020-04-24
Attending: EMERGENCY MEDICINE | Admitting: INTERNAL MEDICINE
Payer: MEDICARE

## 2020-04-22 PROBLEM — R53.1 GENERALIZED WEAKNESS: Status: ACTIVE | Noted: 2020-04-22

## 2020-04-22 PROBLEM — I10 HTN (HYPERTENSION), BENIGN: Status: ACTIVE | Noted: 2018-05-13

## 2020-04-22 PROBLEM — E87.1 HYPONATREMIA: Status: ACTIVE | Noted: 2020-04-22

## 2020-04-22 PROBLEM — E44.0 MODERATE PROTEIN-CALORIE MALNUTRITION (HCC): Status: ACTIVE | Noted: 2020-04-22

## 2020-04-22 PROBLEM — R26.2 UNABLE TO WALK: Status: ACTIVE | Noted: 2020-04-22

## 2020-04-22 LAB
ALBUMIN SERPL-MCNC: 3.3 G/DL (ref 3.5–5.2)
ALP BLD-CCNC: 81 U/L (ref 40–129)
ALT SERPL-CCNC: 18 U/L (ref 0–40)
ANION GAP SERPL CALCULATED.3IONS-SCNC: 9 MMOL/L (ref 7–16)
AST SERPL-CCNC: 45 U/L (ref 0–39)
BACTERIA: ABNORMAL /HPF
BASOPHILS ABSOLUTE: 0.05 E9/L (ref 0–0.2)
BASOPHILS RELATIVE PERCENT: 0.8 % (ref 0–2)
BILIRUB SERPL-MCNC: 1.3 MG/DL (ref 0–1.2)
BILIRUBIN DIRECT: 0.2 MG/DL (ref 0–0.3)
BILIRUBIN URINE: NEGATIVE
BILIRUBIN, INDIRECT: 1.1 MG/DL (ref 0–1)
BLOOD, URINE: NEGATIVE
BUN BLDV-MCNC: 14 MG/DL (ref 8–23)
CALCIUM SERPL-MCNC: 9 MG/DL (ref 8.6–10.2)
CHLORIDE BLD-SCNC: 92 MMOL/L (ref 98–107)
CLARITY: CLEAR
CO2: 26 MMOL/L (ref 22–29)
COLOR: ABNORMAL
CREAT SERPL-MCNC: 0.5 MG/DL (ref 0.7–1.2)
EOSINOPHILS ABSOLUTE: 0.09 E9/L (ref 0.05–0.5)
EOSINOPHILS RELATIVE PERCENT: 1.5 % (ref 0–6)
GFR AFRICAN AMERICAN: >60
GFR NON-AFRICAN AMERICAN: >60 ML/MIN/1.73
GLUCOSE BLD-MCNC: 105 MG/DL (ref 74–99)
GLUCOSE URINE: NEGATIVE MG/DL
HCT VFR BLD CALC: 38.5 % (ref 37–54)
HEMOGLOBIN: 13.1 G/DL (ref 12.5–16.5)
IMMATURE GRANULOCYTES #: 0.02 E9/L
IMMATURE GRANULOCYTES %: 0.3 % (ref 0–5)
KETONES, URINE: NEGATIVE MG/DL
LEUKOCYTE ESTERASE, URINE: NEGATIVE
LYMPHOCYTES ABSOLUTE: 1.11 E9/L (ref 1.5–4)
LYMPHOCYTES RELATIVE PERCENT: 18.5 % (ref 20–42)
MCH RBC QN AUTO: 35.4 PG (ref 26–35)
MCHC RBC AUTO-ENTMCNC: 34 % (ref 32–34.5)
MCV RBC AUTO: 104.1 FL (ref 80–99.9)
MONOCYTES ABSOLUTE: 0.98 E9/L (ref 0.1–0.95)
MONOCYTES RELATIVE PERCENT: 16.3 % (ref 2–12)
NEUTROPHILS ABSOLUTE: 3.76 E9/L (ref 1.8–7.3)
NEUTROPHILS RELATIVE PERCENT: 62.6 % (ref 43–80)
NITRITE, URINE: NEGATIVE
PDW BLD-RTO: 13.5 FL (ref 11.5–15)
PH UA: 6.5 (ref 5–9)
PLATELET # BLD: 205 E9/L (ref 130–450)
PMV BLD AUTO: 9.2 FL (ref 7–12)
POTASSIUM SERPL-SCNC: 4.4 MMOL/L (ref 3.5–5)
PROTEIN UA: NEGATIVE MG/DL
RBC # BLD: 3.7 E12/L (ref 3.8–5.8)
RBC UA: ABNORMAL /HPF (ref 0–2)
SODIUM BLD-SCNC: 127 MMOL/L (ref 132–146)
SPECIFIC GRAVITY UA: 1.02 (ref 1–1.03)
TOTAL PROTEIN: 8 G/DL (ref 6.4–8.3)
TROPONIN: <0.01 NG/ML (ref 0–0.03)
TSH SERPL DL<=0.05 MIU/L-ACNC: 2.79 UIU/ML (ref 0.27–4.2)
UROBILINOGEN, URINE: 1 E.U./DL
WBC # BLD: 6 E9/L (ref 4.5–11.5)
WBC UA: ABNORMAL /HPF (ref 0–5)

## 2020-04-22 PROCEDURE — 71045 X-RAY EXAM CHEST 1 VIEW: CPT

## 2020-04-22 PROCEDURE — 99223 1ST HOSP IP/OBS HIGH 75: CPT | Performed by: INTERNAL MEDICINE

## 2020-04-22 PROCEDURE — 80076 HEPATIC FUNCTION PANEL: CPT

## 2020-04-22 PROCEDURE — 84484 ASSAY OF TROPONIN QUANT: CPT

## 2020-04-22 PROCEDURE — 70450 CT HEAD/BRAIN W/O DYE: CPT

## 2020-04-22 PROCEDURE — 84443 ASSAY THYROID STIM HORMONE: CPT

## 2020-04-22 PROCEDURE — 80048 BASIC METABOLIC PNL TOTAL CA: CPT

## 2020-04-22 PROCEDURE — 72170 X-RAY EXAM OF PELVIS: CPT

## 2020-04-22 PROCEDURE — 85025 COMPLETE CBC W/AUTO DIFF WBC: CPT

## 2020-04-22 PROCEDURE — 2060000000 HC ICU INTERMEDIATE R&B

## 2020-04-22 PROCEDURE — 6370000000 HC RX 637 (ALT 250 FOR IP): Performed by: INTERNAL MEDICINE

## 2020-04-22 PROCEDURE — 2580000003 HC RX 258: Performed by: INTERNAL MEDICINE

## 2020-04-22 PROCEDURE — 81001 URINALYSIS AUTO W/SCOPE: CPT

## 2020-04-22 PROCEDURE — 72125 CT NECK SPINE W/O DYE: CPT

## 2020-04-22 PROCEDURE — 93005 ELECTROCARDIOGRAM TRACING: CPT | Performed by: STUDENT IN AN ORGANIZED HEALTH CARE EDUCATION/TRAINING PROGRAM

## 2020-04-22 PROCEDURE — 2580000003 HC RX 258: Performed by: STUDENT IN AN ORGANIZED HEALTH CARE EDUCATION/TRAINING PROGRAM

## 2020-04-22 PROCEDURE — 99285 EMERGENCY DEPT VISIT HI MDM: CPT

## 2020-04-22 RX ORDER — ACETAMINOPHEN 650 MG/1
650 SUPPOSITORY RECTAL EVERY 6 HOURS PRN
Status: DISCONTINUED | OUTPATIENT
Start: 2020-04-22 | End: 2020-04-24 | Stop reason: HOSPADM

## 2020-04-22 RX ORDER — 0.9 % SODIUM CHLORIDE 0.9 %
500 INTRAVENOUS SOLUTION INTRAVENOUS ONCE
Status: COMPLETED | OUTPATIENT
Start: 2020-04-22 | End: 2020-04-22

## 2020-04-22 RX ORDER — DOXYCYCLINE HYCLATE 100 MG/1
100 CAPSULE ORAL 2 TIMES DAILY
Status: DISCONTINUED | OUTPATIENT
Start: 2020-04-22 | End: 2020-04-23

## 2020-04-22 RX ORDER — DOXYCYCLINE HYCLATE 100 MG
100 TABLET ORAL 2 TIMES DAILY
Status: ON HOLD | COMMUNITY
End: 2020-04-24 | Stop reason: HOSPADM

## 2020-04-22 RX ORDER — PREDNISONE 1 MG/1
2.5 TABLET ORAL DAILY
Status: DISCONTINUED | OUTPATIENT
Start: 2020-04-23 | End: 2020-04-24 | Stop reason: HOSPADM

## 2020-04-22 RX ORDER — POTASSIUM CHLORIDE 7.45 MG/ML
10 INJECTION INTRAVENOUS PRN
Status: DISCONTINUED | OUTPATIENT
Start: 2020-04-22 | End: 2020-04-24 | Stop reason: HOSPADM

## 2020-04-22 RX ORDER — POTASSIUM CHLORIDE 20 MEQ/1
40 TABLET, EXTENDED RELEASE ORAL PRN
Status: DISCONTINUED | OUTPATIENT
Start: 2020-04-22 | End: 2020-04-24 | Stop reason: HOSPADM

## 2020-04-22 RX ORDER — BUDESONIDE AND FORMOTEROL FUMARATE DIHYDRATE 80; 4.5 UG/1; UG/1
2 AEROSOL RESPIRATORY (INHALATION) 2 TIMES DAILY
Status: DISCONTINUED | OUTPATIENT
Start: 2020-04-22 | End: 2020-04-22 | Stop reason: CLARIF

## 2020-04-22 RX ORDER — BUDESONIDE 0.25 MG/2ML
0.25 INHALANT ORAL 2 TIMES DAILY
Status: DISCONTINUED | OUTPATIENT
Start: 2020-04-22 | End: 2020-04-24 | Stop reason: HOSPADM

## 2020-04-22 RX ORDER — MAGNESIUM SULFATE 1 G/100ML
1 INJECTION INTRAVENOUS PRN
Status: DISCONTINUED | OUTPATIENT
Start: 2020-04-22 | End: 2020-04-24 | Stop reason: HOSPADM

## 2020-04-22 RX ORDER — SODIUM CHLORIDE 0.9 % (FLUSH) 0.9 %
10 SYRINGE (ML) INJECTION EVERY 12 HOURS SCHEDULED
Status: DISCONTINUED | OUTPATIENT
Start: 2020-04-22 | End: 2020-04-24 | Stop reason: HOSPADM

## 2020-04-22 RX ORDER — SODIUM CHLORIDE 0.9 % (FLUSH) 0.9 %
10 SYRINGE (ML) INJECTION PRN
Status: DISCONTINUED | OUTPATIENT
Start: 2020-04-22 | End: 2020-04-24 | Stop reason: HOSPADM

## 2020-04-22 RX ORDER — ONDANSETRON 2 MG/ML
4 INJECTION INTRAMUSCULAR; INTRAVENOUS EVERY 6 HOURS PRN
Status: DISCONTINUED | OUTPATIENT
Start: 2020-04-22 | End: 2020-04-24 | Stop reason: HOSPADM

## 2020-04-22 RX ORDER — ALBUTEROL SULFATE 2.5 MG/3ML
2.5 SOLUTION RESPIRATORY (INHALATION) 4 TIMES DAILY
Status: DISCONTINUED | OUTPATIENT
Start: 2020-04-22 | End: 2020-04-24 | Stop reason: HOSPADM

## 2020-04-22 RX ORDER — ESCITALOPRAM OXALATE 10 MG/1
10 TABLET ORAL DAILY
Status: DISCONTINUED | OUTPATIENT
Start: 2020-04-23 | End: 2020-04-24 | Stop reason: HOSPADM

## 2020-04-22 RX ORDER — ARFORMOTEROL TARTRATE 15 UG/2ML
15 SOLUTION RESPIRATORY (INHALATION) 2 TIMES DAILY
Status: DISCONTINUED | OUTPATIENT
Start: 2020-04-22 | End: 2020-04-24 | Stop reason: HOSPADM

## 2020-04-22 RX ORDER — LEVOTHYROXINE SODIUM 0.03 MG/1
25 TABLET ORAL DAILY
Status: DISCONTINUED | OUTPATIENT
Start: 2020-04-23 | End: 2020-04-24 | Stop reason: HOSPADM

## 2020-04-22 RX ORDER — ACETAMINOPHEN 325 MG/1
650 TABLET ORAL EVERY 6 HOURS PRN
Status: DISCONTINUED | OUTPATIENT
Start: 2020-04-22 | End: 2020-04-24 | Stop reason: HOSPADM

## 2020-04-22 RX ORDER — SODIUM CHLORIDE 9 MG/ML
INJECTION, SOLUTION INTRAVENOUS CONTINUOUS
Status: DISCONTINUED | OUTPATIENT
Start: 2020-04-22 | End: 2020-04-23

## 2020-04-22 RX ADMIN — SODIUM CHLORIDE 500 ML: 9 INJECTION, SOLUTION INTRAVENOUS at 19:09

## 2020-04-22 RX ADMIN — SODIUM CHLORIDE: 9 INJECTION, SOLUTION INTRAVENOUS at 21:59

## 2020-04-22 RX ADMIN — DOXYCYCLINE HYCLATE 100 MG: 100 CAPSULE ORAL at 21:58

## 2020-04-22 ASSESSMENT — ENCOUNTER SYMPTOMS
CONSTIPATION: 0
TROUBLE SWALLOWING: 0
SHORTNESS OF BREATH: 0
SINUS PRESSURE: 0
RHINORRHEA: 0
BLOOD IN STOOL: 0
BACK PAIN: 0
DIARRHEA: 0
WHEEZING: 0
EYE REDNESS: 0
NAUSEA: 0
EYE PAIN: 0
VOMITING: 0
PHOTOPHOBIA: 0
COUGH: 0
ABDOMINAL PAIN: 0
CHEST TIGHTNESS: 0
ABDOMINAL DISTENTION: 0
SORE THROAT: 0

## 2020-04-22 ASSESSMENT — PAIN SCALES - GENERAL: PAINLEVEL_OUTOF10: 0

## 2020-04-22 NOTE — ED PROVIDER NOTES
Patient is an 28-year-old male who presents to ED for evaluation of fall. Onset of symptoms was earlier this morning. Patient is somewhat of a poor historian, notes is unsure if he fell yesterday or today. Patient describes the fall as mechanical in nature, \"after stepping backwards trying to arrange my remote controls\". Spoke with wife who patient lives with-- notes that patient has been falling over the past 1 to 2 weeks multiple times. Notes that patient has had increased fatigue. Caretaker was at house and instructed him to be evaluated in ED secondary to inability to stand after sitting on shower stool. Wife notes that he she has had to repeat questions multiple times-- unsure if he is confused or has had increased difficulty with hearing. Patient denies any pain to the lower extremities or hips. Patient notes that he walks with a assistive device. Patient denies trauma to the head or neck. Patient is not on anticoagulation. Patient denies preceding shortness of breath or chest pain prior to event. Patient notes that last fall was approximately 1 week prior. Patient denies associated fever, chills, cough, congestion, nausea, vomiting or abdominal pain. The history is provided by the patient and medical records. Review of Systems   Constitutional: Negative for chills, diaphoresis, fatigue and fever. HENT: Negative for congestion, ear pain, rhinorrhea, sinus pressure, sneezing, sore throat and trouble swallowing. Eyes: Negative for photophobia, pain and redness. Respiratory: Negative for cough, chest tightness, shortness of breath and wheezing. Cardiovascular: Negative for chest pain and palpitations. Gastrointestinal: Negative for abdominal distention, abdominal pain, blood in stool, constipation, diarrhea, nausea and vomiting. Endocrine: Negative for polydipsia and polyuria. Genitourinary: Negative for difficulty urinating, dysuria, flank pain, hematuria and urgency. exam intact. Negative logroll. Compartments soft. Skin:     General: Skin is warm and dry. Findings: No rash. Neurological:      Mental Status: He is alert. Comments: Patient is alert, oriented to person and place only. No focal motor or sensory deficit to the bilateral upper or lower extremities. Patient with no appreciable aphasia or dysarthria. No unilateral flattening of the nasolabial folds. Psychiatric:         Behavior: Behavior normal.         Thought Content: Thought content normal.         Judgment: Judgment normal.          Procedures     MDM  Number of Diagnoses or Management Options  General weakness:   Hyponatremia:   Recurrent falls:   Diagnosis management comments: Patient is an 26-year-old male who presented to ED for evaluation of fall, difficulty with ambulation. On arrival to ED, physical exam as noted above. EKG with unchanged left anterior fascicular block and right bundle branch block as noted previously. Labs reviewed, hyponatremia. Spoke with patient's wife who notes that he has had increased recurrent falls, confusion and weakness over the past 1.5 weeks and is concerned that patient will injure himself with fall. Patient to be admitted for further evaluation and management. On repeat examination prior to admission, patient resting comfortably in bed in no apparent distress.   Patient with no new complaints prior to admission and is agreeable to plan.               --------------------------------------------- PAST HISTORY ---------------------------------------------  Past Medical History:  has a past medical history of Anxiety, Asthma, Bladder cancer ([address] Chronic sinusitis, Closed displaced midcervical fracture of left femur ([facility] Complication of internal orthopedic device, initial encounter ([facility] Constipation, chronic, COPD, Degenerative joint disease of left knee, Depression, Gastritis, Hearing loss, Hyperlipidemia, Hypertension, Hypothyroidism, Iron

## 2020-04-23 PROBLEM — R79.89 LOW VITAMIN B12 LEVEL: Status: ACTIVE | Noted: 2020-04-23

## 2020-04-23 PROBLEM — E43 SEVERE PROTEIN-CALORIE MALNUTRITION (HCC): Chronic | Status: ACTIVE | Noted: 2020-04-22

## 2020-04-23 PROBLEM — E53.8 LOW VITAMIN B12 LEVEL: Status: ACTIVE | Noted: 2020-04-23

## 2020-04-23 LAB
ALBUMIN SERPL-MCNC: 3 G/DL (ref 3.5–5.2)
ALP BLD-CCNC: 81 U/L (ref 40–129)
ALT SERPL-CCNC: 16 U/L (ref 0–40)
ANION GAP SERPL CALCULATED.3IONS-SCNC: 10 MMOL/L (ref 7–16)
AST SERPL-CCNC: 37 U/L (ref 0–39)
BASOPHILS ABSOLUTE: 0.06 E9/L (ref 0–0.2)
BASOPHILS RELATIVE PERCENT: 1 % (ref 0–2)
BILIRUB SERPL-MCNC: 1.1 MG/DL (ref 0–1.2)
BILIRUBIN DIRECT: 0.3 MG/DL (ref 0–0.3)
BILIRUBIN, INDIRECT: 0.8 MG/DL (ref 0–1)
BUN BLDV-MCNC: 14 MG/DL (ref 8–23)
CALCIUM SERPL-MCNC: 8.4 MG/DL (ref 8.6–10.2)
CHLORIDE BLD-SCNC: 96 MMOL/L (ref 98–107)
CO2: 23 MMOL/L (ref 22–29)
CREAT SERPL-MCNC: 0.5 MG/DL (ref 0.7–1.2)
EKG ATRIAL RATE: 89 BPM
EKG P AXIS: 62 DEGREES
EKG P-R INTERVAL: 178 MS
EKG Q-T INTERVAL: 380 MS
EKG QRS DURATION: 128 MS
EKG QTC CALCULATION (BAZETT): 462 MS
EKG R AXIS: -62 DEGREES
EKG T AXIS: 52 DEGREES
EKG VENTRICULAR RATE: 89 BPM
EOSINOPHILS ABSOLUTE: 0.1 E9/L (ref 0.05–0.5)
EOSINOPHILS RELATIVE PERCENT: 1.7 % (ref 0–6)
FOLATE: 8.4 NG/ML (ref 4.8–24.2)
GFR AFRICAN AMERICAN: >60
GFR NON-AFRICAN AMERICAN: >60 ML/MIN/1.73
GLUCOSE BLD-MCNC: 105 MG/DL (ref 74–99)
HCT VFR BLD CALC: 35.1 % (ref 37–54)
HEMOGLOBIN: 12.1 G/DL (ref 12.5–16.5)
IMMATURE GRANULOCYTES #: 0.02 E9/L
IMMATURE GRANULOCYTES %: 0.3 % (ref 0–5)
LYMPHOCYTES ABSOLUTE: 1.04 E9/L (ref 1.5–4)
LYMPHOCYTES RELATIVE PERCENT: 17.7 % (ref 20–42)
MCH RBC QN AUTO: 35.3 PG (ref 26–35)
MCHC RBC AUTO-ENTMCNC: 34.5 % (ref 32–34.5)
MCV RBC AUTO: 102.3 FL (ref 80–99.9)
MONOCYTES ABSOLUTE: 0.99 E9/L (ref 0.1–0.95)
MONOCYTES RELATIVE PERCENT: 16.9 % (ref 2–12)
NEUTROPHILS ABSOLUTE: 3.65 E9/L (ref 1.8–7.3)
NEUTROPHILS RELATIVE PERCENT: 62.4 % (ref 43–80)
OSMOLALITY: 273 MOSM/KG (ref 285–310)
PDW BLD-RTO: 13.5 FL (ref 11.5–15)
PLATELET # BLD: 211 E9/L (ref 130–450)
PMV BLD AUTO: 9.4 FL (ref 7–12)
POTASSIUM REFLEX MAGNESIUM: 4 MMOL/L (ref 3.5–5)
RBC # BLD: 3.43 E12/L (ref 3.8–5.8)
SODIUM BLD-SCNC: 129 MMOL/L (ref 132–146)
TOTAL PROTEIN: 7.3 G/DL (ref 6.4–8.3)
VITAMIN B-12: 189 PG/ML (ref 211–946)
WBC # BLD: 5.9 E9/L (ref 4.5–11.5)

## 2020-04-23 PROCEDURE — 6370000000 HC RX 637 (ALT 250 FOR IP): Performed by: INTERNAL MEDICINE

## 2020-04-23 PROCEDURE — 2060000000 HC ICU INTERMEDIATE R&B

## 2020-04-23 PROCEDURE — 6360000002 HC RX W HCPCS: Performed by: INTERNAL MEDICINE

## 2020-04-23 PROCEDURE — 80048 BASIC METABOLIC PNL TOTAL CA: CPT

## 2020-04-23 PROCEDURE — 83930 ASSAY OF BLOOD OSMOLALITY: CPT

## 2020-04-23 PROCEDURE — 94640 AIRWAY INHALATION TREATMENT: CPT

## 2020-04-23 PROCEDURE — 93010 ELECTROCARDIOGRAM REPORT: CPT | Performed by: INTERNAL MEDICINE

## 2020-04-23 PROCEDURE — 2580000003 HC RX 258: Performed by: INTERNAL MEDICINE

## 2020-04-23 PROCEDURE — 99232 SBSQ HOSP IP/OBS MODERATE 35: CPT | Performed by: FAMILY MEDICINE

## 2020-04-23 PROCEDURE — 36415 COLL VENOUS BLD VENIPUNCTURE: CPT

## 2020-04-23 PROCEDURE — 82607 VITAMIN B-12: CPT

## 2020-04-23 PROCEDURE — 83921 ORGANIC ACID SINGLE QUANT: CPT

## 2020-04-23 PROCEDURE — 6370000000 HC RX 637 (ALT 250 FOR IP): Performed by: STUDENT IN AN ORGANIZED HEALTH CARE EDUCATION/TRAINING PROGRAM

## 2020-04-23 PROCEDURE — 97530 THERAPEUTIC ACTIVITIES: CPT

## 2020-04-23 PROCEDURE — 82746 ASSAY OF FOLIC ACID SERUM: CPT

## 2020-04-23 PROCEDURE — 80076 HEPATIC FUNCTION PANEL: CPT

## 2020-04-23 PROCEDURE — 97165 OT EVAL LOW COMPLEX 30 MIN: CPT | Performed by: OCCUPATIONAL THERAPIST

## 2020-04-23 PROCEDURE — 6360000002 HC RX W HCPCS: Performed by: STUDENT IN AN ORGANIZED HEALTH CARE EDUCATION/TRAINING PROGRAM

## 2020-04-23 PROCEDURE — 85025 COMPLETE CBC W/AUTO DIFF WBC: CPT

## 2020-04-23 PROCEDURE — 2580000003 HC RX 258: Performed by: STUDENT IN AN ORGANIZED HEALTH CARE EDUCATION/TRAINING PROGRAM

## 2020-04-23 PROCEDURE — 97161 PT EVAL LOW COMPLEX 20 MIN: CPT

## 2020-04-23 RX ORDER — SODIUM CHLORIDE 9 MG/ML
INJECTION, SOLUTION INTRAVENOUS CONTINUOUS
Status: DISCONTINUED | OUTPATIENT
Start: 2020-04-23 | End: 2020-04-24

## 2020-04-23 RX ORDER — CYANOCOBALAMIN 1000 UG/ML
1000 INJECTION INTRAMUSCULAR; SUBCUTANEOUS ONCE
Status: COMPLETED | OUTPATIENT
Start: 2020-04-23 | End: 2020-04-23

## 2020-04-23 RX ORDER — FOLIC ACID 1 MG/1
1 TABLET ORAL DAILY
Status: DISCONTINUED | OUTPATIENT
Start: 2020-04-23 | End: 2020-04-24 | Stop reason: HOSPADM

## 2020-04-23 RX ADMIN — ESCITALOPRAM OXALATE 10 MG: 10 TABLET ORAL at 07:52

## 2020-04-23 RX ADMIN — SODIUM CHLORIDE: 9 INJECTION, SOLUTION INTRAVENOUS at 07:53

## 2020-04-23 RX ADMIN — SODIUM CHLORIDE: 9 INJECTION, SOLUTION INTRAVENOUS at 21:51

## 2020-04-23 RX ADMIN — CYANOCOBALAMIN 1000 MCG: 1000 INJECTION, SOLUTION INTRAMUSCULAR at 09:52

## 2020-04-23 RX ADMIN — DOXYCYCLINE HYCLATE 100 MG: 100 CAPSULE ORAL at 07:52

## 2020-04-23 RX ADMIN — FOLIC ACID 1 MG: 1 TABLET ORAL at 12:34

## 2020-04-23 RX ADMIN — ARFORMOTEROL TARTRATE 15 MCG: 15 SOLUTION RESPIRATORY (INHALATION) at 00:12

## 2020-04-23 RX ADMIN — SODIUM CHLORIDE, PRESERVATIVE FREE 10 ML: 5 INJECTION INTRAVENOUS at 07:53

## 2020-04-23 RX ADMIN — BUDESONIDE 250 MCG: 0.25 SUSPENSION RESPIRATORY (INHALATION) at 00:11

## 2020-04-23 RX ADMIN — SODIUM CHLORIDE: 9 INJECTION, SOLUTION INTRAVENOUS at 09:52

## 2020-04-23 RX ADMIN — ALBUTEROL SULFATE 2.5 MG: 2.5 SOLUTION RESPIRATORY (INHALATION) at 00:10

## 2020-04-23 RX ADMIN — PREDNISONE 2.5 MG: 5 TABLET ORAL at 07:52

## 2020-04-23 RX ADMIN — ENOXAPARIN SODIUM 40 MG: 40 INJECTION SUBCUTANEOUS at 07:52

## 2020-04-23 RX ADMIN — ACETAMINOPHEN 650 MG: 325 TABLET ORAL at 07:52

## 2020-04-23 ASSESSMENT — PAIN SCALES - GENERAL
PAINLEVEL_OUTOF10: 0

## 2020-04-23 NOTE — PROGRESS NOTES
Attempted to contact patients wife Jose Angelavinash Maier about patients home medications.  Unable to reach her, will try again

## 2020-04-23 NOTE — PROGRESS NOTES
[facility] INITIAL EVALUATION      Date:2020  Patient Name: Arjun Lucero  MRN: [id number]  : 1935  Room: [address] [place], [place] [address]    Referring Provider:      Evaluating OT: Kelly Landon    AM-PAC Daily Activity Raw Score: AM-PAC Daily Activity Inpatient   How much help for putting on and taking off regular lower body clothing?: Total  How much help for Bathing?: Total  How much help for Toileting?: Total  How much help for putting on and taking off regular upper body clothing?: A Lot  How much help for taking care of personal grooming?: A Lot  How much help for eating meals?: A Little  AM-PAC Inpatient Daily Activity Raw Score: 10  AM-PAC Inpatient ADL T-Scale Score : 27.31  ADL Inpatient CMS 0-100% Score: 74.7  ADL Inpatient CMS G-Code Modifier : CL      Recommended Adaptive Equipment: TBD     Diagnosis: patient admitted secondary to fall   Surgery: none   Pertinent Medical History:    Past Medical History:   Diagnosis Date    Anxiety     Asthma     [facility]     Chronic sinusitis     Closed displaced midcervical fracture of left femur ([facility] [address]    Complication of internal orthopedic device, initial encounter ([facility] 2018    Hardware not a problem, just in the way of fixation of broken hip, must be removed to insert hemiarthroplasty    Constipation, chronic     COPD     Degenerative joint disease of left knee 5/15/2018    Depression     Gastritis     hx of gastritis    Hearing loss     Hyperlipidemia     Hypertension     Hypothyroidism     Iron deficiency anemia     Left leg pain 7/15/2019    Prediabetes     Type 2 diabetes mellitus without complication ([address]     Vitamin D deficiency       Precautions:  Falls     Home Living: Pt lives with spouse in one floor home. No further information provided.   Patient with decreased orientation   Bathroom setup: none noted   Equipment owned: none noted    Prior Level of Function: independent with ADLs , independent with IADLs; ambulated without devic3  Driving: does not note  Occupation: no    Pain Level: none noted during evaluatoin  Cognition: A&O: 1/4; Follows 2 step directions   Memory:  poor   Sequencing:  fair   Problem solving:  fair   Judgement/safety:  fair     Functional Assessment:   Initial Eval Status  Date: 23Apr. 2020 Treatment Status  Date: STGs = LTGs  Time frame: 5-7 days   Feeding Supervision      Grooming Minimal assist   Modified [place]    UB Dressing Minimal Assist   Modified [place]    LB Dressing Maximal Assist   Minimal Assist    Bathing Maximal Assist  Minimal Assist    Toileting Maximal Assist   Minimal Assist    Bed Mobility  Supine to sit: Maximal Assist   Sit to supine: Maximal Assist   Supine to sit: Minimal Assist   Sit to supine: Minimal Assist    Functional Transfers Maximal Assist   Minimal Assist    Functional Mobility Maximal Assist   Minimal Assist    Balance Sitting:     Static:  Stand by assist    Dynamic:minimal assist  Standing: maximum assist     Activity Tolerance fair  good   Visual/  Perceptual Glasses: yes                Hand Dominance right   Strength ROM Additional Info:    RUE  3-/5  WFL with exception of passive/active shoulder flexion/abduction limited good  and wfl FMC/dexterity noted during ADL tasks       LUE 3-/5  WFL with exception of passive/active shoulder flexion/abduction 0 - 90 degrees good  and wfl FMC/dexterity noted during ADL tasks       Hearing: hard of hearing  Sensation:  WFL for light touch  Tone: WFL   Edema: none noted at time of evaluation    Comments: Upon arrival patient supine in bed. Patient seated in chair At end of session, patient with call light and phone within reach, all lines and tubes intact. Overall patient demonstrated decreased independence and safety during completion of ADL/functional transfer/mobility tasks.   Pt would benefit from continued skilled OT to increase safety and independence with completion of ADL/IADL tasks for functional independence and quality of life. Eval Complexity: Low Complexity    Assessment of current deficits   Functional mobility [x]  ADLs [x] Strength [x]  Cognition []  Functional transfers  [x] IADLs [x] Safety Awareness [x]  Endurance [x]  Fine Motor Coordination [] Balance [x] Vision/perception [] Sensation []   Gross Motor Coordination [] ROM [] Delirium []                  Motor Control []    Plan of Care:  OT 1-3x/week PRN   ADL retraining [x]   Equipment needs [x]   Neuromuscular re-education [x] Energy Conservation Techniques [x]  Functional Transfer training [x] Patient and/or Family Education [x]  Functional Mobility training [x]  Environmental Modifications [x]  Cognitive re-training []   Compensatory techniques for ADLs [x]  Splinting Needs []   Positioning to improve overall function [x]   Therapeutic Activity [x]  Therapeutic Exercise  [x]  Visual/Perceptual: []    Delirium prevention/treatment  []   Other:  []    Rehab Potential: Good for established goals     Patient / Family Goal: no goal noted at time of evaluation      Patient and/or family were instructed on functional diagnosis, prognosis/goals and OT plan of care. Demonstrated fair understanding.      Evaluation (+)    Treatment Time In:0900            Treatment Time Out: 9141                 Evaluation time includes thorough review of current medical information, gathering information on past medical history/social history and prior level of function, completion of standardized testing/informal observation of tasks, assessment of data, and development of POC/Goals      [employer], OTR/L, EDIS

## 2020-04-23 NOTE — PROGRESS NOTES
T Axis 52 degrees   CBC auto differential    Collection Time: 04/22/20  4:56 PM   Result Value Ref Range    WBC 6.0 4.5 - 11.5 E9/L    RBC 3.70 (L) 3.80 - 5.80 E12/L    Hemoglobin 13.1 12.5 - 16.5 g/dL    Hematocrit 38.5 37.0 - 54.0 %    .1 (H) 80.0 - 99.9 fL    MCH 35.4 (H) 26.0 - 35.0 pg    MCHC 34.0 32.0 - 34.5 %    RDW 13.5 11.5 - 15.0 fL    Platelets 174 453 - 928 E9/L    MPV 9.2 7.0 - 12.0 fL    Neutrophils % 62.6 43.0 - 80.0 %    Immature Granulocytes % 0.3 0.0 - 5.0 %    Lymphocytes % 18.5 (L) 20.0 - 42.0 %    Monocytes % 16.3 (H) 2.0 - 12.0 %    Eosinophils % 1.5 0.0 - 6.0 %    Basophils % 0.8 0.0 - 2.0 %    Neutrophils Absolute 3.76 1.80 - 7.30 E9/L    Immature Granulocytes # 0.02 E9/L    Lymphocytes Absolute 1.11 (L) 1.50 - 4.00 E9/L    Monocytes Absolute 0.98 (H) 0.10 - 0.95 E9/L    Eosinophils Absolute 0.09 0.05 - 0.50 E9/L    Basophils Absolute 0.05 0.00 - 0.20 R3/V   Basic Metabolic Panel    Collection Time: 04/22/20  4:56 PM   Result Value Ref Range    Sodium 127 (L) 132 - 146 mmol/L    Potassium 4.4 3.5 - 5.0 mmol/L    Chloride 92 (L) 98 - 107 mmol/L    CO2 26 22 - 29 mmol/L    Anion Gap 9 7 - 16 mmol/L    Glucose 105 (H) 74 - 99 mg/dL    BUN 14 8 - 23 mg/dL    CREATININE 0.5 (L) 0.7 - 1.2 mg/dL    GFR Non-African American >60 >=60 mL/min/1.73    GFR African American >60     Calcium 9.0 8.6 - 10.2 mg/dL   Urinalysis with Microscopic    Collection Time: 04/22/20  4:56 PM   Result Value Ref Range    Color, UA DARK YELLOW (A) Straw/Yellow    Clarity, UA Clear Clear    Glucose, Ur Negative Negative mg/dL    Bilirubin Urine Negative Negative    Ketones, Urine Negative Negative mg/dL    Specific Gravity, UA 1.020 1.005 - 1.030    Blood, Urine Negative Negative    pH, UA 6.5 5.0 - 9.0    Protein, UA Negative Negative mg/dL    Urobilinogen, Urine 1.0 <2.0 E.U./dL    Nitrite, Urine Negative Negative    Leukocyte Esterase, Urine Negative Negative    WBC, UA NONE 0 - 5 /HPF    RBC, UA NONE 0 - 2 /HPF Bacteria, UA NONE SEEN None Seen /HPF   Troponin    Collection Time: 04/22/20  4:56 PM   Result Value Ref Range    Troponin <0.01 0.00 - 0.03 ng/mL   TSH without Reflex    Collection Time: 04/22/20  4:56 PM   Result Value Ref Range    TSH 2.790 0.270 - 4.200 uIU/mL   Hepatic Function Panel    Collection Time: 04/22/20  4:56 PM   Result Value Ref Range    Total Protein 8.0 6.4 - 8.3 g/dL    Alb 3.3 (L) 3.5 - 5.2 g/dL    Alkaline Phosphatase 81 40 - 129 U/L    ALT 18 0 - 40 U/L    AST 45 (H) 0 - 39 U/L    Total Bilirubin 1.3 (H) 0.0 - 1.2 mg/dL    Bilirubin, Direct 0.2 0.0 - 0.3 mg/dL    Bilirubin, Indirect 1.1 (H) 0.0 - 1.0 mg/dL   Vitamin B12 & folate    Collection Time: 04/23/20  3:21 AM   Result Value Ref Range    Vitamin B-12 189 (L) 211 - 946 pg/mL    Folate 8.4 4.8 - 24.2 ng/mL   Basic Metabolic Panel w/ Reflex to MG    Collection Time: 04/23/20  3:21 AM   Result Value Ref Range    Sodium 129 (L) 132 - 146 mmol/L    Potassium reflex Magnesium 4.0 3.5 - 5.0 mmol/L    Chloride 96 (L) 98 - 107 mmol/L    CO2 23 22 - 29 mmol/L    Anion Gap 10 7 - 16 mmol/L    Glucose 105 (H) 74 - 99 mg/dL    BUN 14 8 - 23 mg/dL    CREATININE 0.5 (L) 0.7 - 1.2 mg/dL    GFR Non-African American >60 >=60 mL/min/1.73    GFR African American >60     Calcium 8.4 (L) 8.6 - 10.2 mg/dL   Hepatic function panel    Collection Time: 04/23/20  3:21 AM   Result Value Ref Range    Total Protein 7.3 6.4 - 8.3 g/dL    Alb 3.0 (L) 3.5 - 5.2 g/dL    Alkaline Phosphatase 81 40 - 129 U/L    ALT 16 0 - 40 U/L    AST 37 0 - 39 U/L    Total Bilirubin 1.1 0.0 - 1.2 mg/dL    Bilirubin, Direct 0.3 0.0 - 0.3 mg/dL    Bilirubin, Indirect 0.8 0.0 - 1.0 mg/dL   CBC auto differential    Collection Time: 04/23/20  3:21 AM   Result Value Ref Range    WBC 5.9 4.5 - 11.5 E9/L    RBC 3.43 (L) 3.80 - 5.80 E12/L    Hemoglobin 12.1 (L) 12.5 - 16.5 g/dL    Hematocrit 35.1 (L) 37.0 - 54.0 %    .3 (H) 80.0 - 99.9 fL    MCH 35.3 (H) 26.0 - 35.0 pg    MCHC 34.5 32.0 - 34.5 %    RDW 13.5 11.5 - 15.0 fL    Platelets 766 835 - 818 E9/L    MPV 9.4 7.0 - 12.0 fL    Neutrophils % 62.4 43.0 - 80.0 %    Immature Granulocytes % 0.3 0.0 - 5.0 %    Lymphocytes % 17.7 (L) 20.0 - 42.0 %    Monocytes % 16.9 (H) 2.0 - 12.0 %    Eosinophils % 1.7 0.0 - 6.0 %    Basophils % 1.0 0.0 - 2.0 %    Neutrophils Absolute 3.65 1.80 - 7.30 E9/L    Immature Granulocytes # 0.02 E9/L    Lymphocytes Absolute 1.04 (L) 1.50 - 4.00 E9/L    Monocytes Absolute 0.99 (H) 0.10 - 0.95 E9/L    Eosinophils Absolute 0.10 0.05 - 0.50 E9/L    Basophils Absolute 0.06 0.00 - 0.20 E9/L   Osmolality, Serum    Collection Time: 04/23/20  3:21 AM   Result Value Ref Range    Osmolality 273 (L) 285 - 310 mOsm/Kg       Radiology and other tests reviewed:  CT Head WO Contrast   Final Result   Diffuse atrophy likely age related   Findings compatible with small vessel ischemic changes. CT Cervical Spine WO Contrast   Final Result   No acute fracture or dislocation. Degenerative spondylotic changes   which are rather pronounced as outlined above. XR CHEST PORTABLE   Final Result   Pleural thickening and/or effusion on the right. XR PELVIS (1-2 VIEWS)   Final Result   No acute fracture or dislocation. Bony demineralization. Left hip   arthroplasty present.           Assessment:  Active Hospital Problems    Diagnosis Date Noted    Low vitamin B12 level [E53.8] 04/23/2020    Hyponatremia [E87.1] 04/22/2020    Unable to walk [R26.2] 04/22/2020    Generalized weakness [R53.1] 04/22/2020    Severe protein-calorie malnutrition ([address] [E43] 04/22/2020    Acquired hypothyroidism [E03.9] 06/13/2019    Vitamin D deficiency [E55.9] 06/13/2019    COPD without exacerbation ([facility] [J44.9] 05/13/2018    Mixed hyperlipidemia [E78.2] 05/13/2018       Plan:  Generalized weakness with inability to walk  -monitor daily labs, still hyponatremic  [facility]  -social work following for placement  -vitamin X87 and folic acid being repleted    Hypothyroidism: Continue Synthroid 112 mcg daily  COPD: Continue nebulizer albuterol, Pulmicort   Hyperlipidemia: Continue atorvastatin  Anxiety: C.w escitalopram   DJD: C.w low dose prednisone      Code Status: Full   DVT prophylaxis: Lovenox   Diet: [address]  Medicine Resident PGY-2  04/23/20   12:50 PM

## 2020-04-23 NOTE — PLAN OF CARE
Problem: Malnutrition  (NI-5.2)  Goal: Food and/or Nutrient Delivery  Pt will tolerate oral diet and ONS with at least 75% intake  Description: Individualized approach for food/nutrient provision.   Outcome: Met This Shift

## 2020-04-23 NOTE — DISCHARGE INSTR - COC
 Anxiety F41.9    Vitamin D deficiency E55.9    Pulmonary emphysema (HCC) J43.9    H/O calcium pyrophosphate deposition disease (CPPD) Z87.39    Unexplained weight loss R63.4    Mild intermittent asthma without complication M14.69    Elevated liver function tests R94.5    Hyponatremia E87.1    Unable to walk R26.2    Generalized weakness R53.1    Severe protein-calorie malnutrition (HCC) E43    Low vitamin B12 level E53.8    Recurrent falls R29.6       Isolation/Infection:   Isolation          No Isolation        Patient Infection Status     None to display          Nurse Assessment:  Last Vital Signs: /77   Pulse 88   Temp 97.9 °F (36.6 °C) (Oral)   Resp 18   Ht 6' 3\" (1.905 m)   Wt 175 lb 1.6 oz (79.4 kg)   SpO2 97%   BMI 21.89 kg/m²     Last documented pain score (0-10 scale): Pain Level: 0  Last Weight:   Wt Readings from Last 1 Encounters:   04/23/20 175 lb 1.6 oz (79.4 kg)     Mental Status:  oriented    IV Access:  {[facility] IV ACCESS:[id number]}    Nursing Mobility/ADLs:  Walking   Assisted  Transfer  Assisted  Bathing  Assisted  Dressing  Assisted  Toileting  Assisted  Feeding  Independent  Med Admin  Assisted  Med Delivery   whole    Wound Care Documentation and Therapy:  Incision 05/14/18 Hip Left (Active)   Number of days: 709       Incision 05/14/18 Knee Left (Active)   Number of days: 709        Elimination:  Continence:   · Bowel: {YES / ME:42123}  · Bladder: {YES / YS:17028}  Urinary Catheter: None   Colostomy/Ileostomy/Ileal Conduit: No       Date of Last BM: 4/24/20        Intake/Output Summary (Last 24 hours) at 4/23/2020 1410  Last data filed at 4/23/2020 1218  Gross per 24 hour   Intake 200 ml   Output 400 ml   Net -200 ml     I/O last 3 completed shifts:  In: -   Out: 150 [Urine:150]    Safety Concerns:      At Risk for Falls    Impairments/Disabilities:      Hearing    Nutrition Therapy:  Current Nutrition Therapy:   - Oral Diet:  Carb Control 4 carbs/meal

## 2020-04-23 NOTE — CARE COORDINATION
Social Work/Discharge Planning:  Social Work consult noted. Called patient wife Hui Seth (ph: [phone]) and completed initial assessment. Explained Social Work role and discussed transition of care/discharge planning. PTA he uses a wheeled walker. Patient has a caregiver once a week to assist with bathing through the [place]. Patient has a bedside commode, extended shower bench and a transfer wheelchair. He has a snf history at [facility]. Patient had frequent falls at home. Discussed AMPAC score and she is agreeable to snf at discharge. Hui Seth states her first snf choice is [facility], 2nd-[facility] and 3rd[facility]. Referral made to louise Rosario with [address] [place] [address] and facility will review patient information. Will continue to follow and assist with discharge planning.   Electronically signed by Claudetta Deeds, LSW on 4/23/2020 at 11:04 AM

## 2020-04-23 NOTE — PROGRESS NOTES
review)  · Anthropometric Measures:  · Ht: 6' 3\" (190.5 cm)   · Current Body Wt: 175 lb (79.4 kg)(4/23 bedwt)  · Admission Body Wt: 174 lb (78.9 kg)(4/22 stated)  · Usual Body Wt: 205 lb (93 kg)(per PCP EMR in July 2019)  · % Weight Change:  ,  15% wt loss in last 10 months  · Ideal Body Wt: 196 lb (88.9 kg), % Ideal Body 89%  · BMI Classification: BMI 18.5 - 24.9 Normal Weight    Nutrition Interventions:   Continue current diet, Start ONS(Diabetic ONS and Ensure HP )  Continued Inpatient Monitoring, Education Not Indicated    Nutrition Evaluation:   · Evaluation: Goals set   · Goals: Pt tolerate PO and ONS with at least 75% intake    · Monitoring: Meal Intake, Supplement Intake, Skin Integrity, I&O, Mental Status/Confusion, Weight, Pertinent Labs      Electronically signed by Scott Campuzano RD, CNSC, LD on 4/23/20 at 11:25 AM EDT    Contact Number: [phone]

## 2020-04-24 VITALS
HEART RATE: 73 BPM | TEMPERATURE: 97.8 F | WEIGHT: 173 LBS | HEIGHT: 75 IN | OXYGEN SATURATION: 98 % | BODY MASS INDEX: 21.51 KG/M2 | DIASTOLIC BLOOD PRESSURE: 86 MMHG | SYSTOLIC BLOOD PRESSURE: 136 MMHG | RESPIRATION RATE: 16 BRPM

## 2020-04-24 PROBLEM — J44.9 COPD WITHOUT EXACERBATION (HCC): Chronic | Status: RESOLVED | Noted: 2018-05-13 | Resolved: 2020-04-24

## 2020-04-24 LAB
ALBUMIN SERPL-MCNC: 2.7 G/DL (ref 3.5–5.2)
ALP BLD-CCNC: 75 U/L (ref 40–129)
ALT SERPL-CCNC: 14 U/L (ref 0–40)
ANION GAP SERPL CALCULATED.3IONS-SCNC: 9 MMOL/L (ref 7–16)
AST SERPL-CCNC: 32 U/L (ref 0–39)
BASOPHILS ABSOLUTE: 0.05 E9/L (ref 0–0.2)
BASOPHILS RELATIVE PERCENT: 1 % (ref 0–2)
BILIRUB SERPL-MCNC: 1 MG/DL (ref 0–1.2)
BILIRUBIN DIRECT: 0.3 MG/DL (ref 0–0.3)
BILIRUBIN, INDIRECT: 0.7 MG/DL (ref 0–1)
BUN BLDV-MCNC: 12 MG/DL (ref 8–23)
CALCIUM SERPL-MCNC: 8.3 MG/DL (ref 8.6–10.2)
CHLORIDE BLD-SCNC: 94 MMOL/L (ref 98–107)
CO2: 24 MMOL/L (ref 22–29)
CREAT SERPL-MCNC: 0.5 MG/DL (ref 0.7–1.2)
EOSINOPHILS ABSOLUTE: 0.14 E9/L (ref 0.05–0.5)
EOSINOPHILS RELATIVE PERCENT: 2.9 % (ref 0–6)
GFR AFRICAN AMERICAN: >60
GFR NON-AFRICAN AMERICAN: >60 ML/MIN/1.73
GLUCOSE BLD-MCNC: 94 MG/DL (ref 74–99)
HCT VFR BLD CALC: 31.9 % (ref 37–54)
HEMOGLOBIN: 11 G/DL (ref 12.5–16.5)
IMMATURE GRANULOCYTES #: 0.01 E9/L
IMMATURE GRANULOCYTES %: 0.2 % (ref 0–5)
LYMPHOCYTES ABSOLUTE: 1.11 E9/L (ref 1.5–4)
LYMPHOCYTES RELATIVE PERCENT: 22.8 % (ref 20–42)
MCH RBC QN AUTO: 35.9 PG (ref 26–35)
MCHC RBC AUTO-ENTMCNC: 34.5 % (ref 32–34.5)
MCV RBC AUTO: 104.2 FL (ref 80–99.9)
MONOCYTES ABSOLUTE: 0.93 E9/L (ref 0.1–0.95)
MONOCYTES RELATIVE PERCENT: 19.1 % (ref 2–12)
NEUTROPHILS ABSOLUTE: 2.63 E9/L (ref 1.8–7.3)
NEUTROPHILS RELATIVE PERCENT: 54 % (ref 43–80)
PDW BLD-RTO: 13.5 FL (ref 11.5–15)
PLATELET # BLD: 202 E9/L (ref 130–450)
PMV BLD AUTO: 9 FL (ref 7–12)
POTASSIUM REFLEX MAGNESIUM: 3.8 MMOL/L (ref 3.5–5)
RBC # BLD: 3.06 E12/L (ref 3.8–5.8)
SODIUM BLD-SCNC: 127 MMOL/L (ref 132–146)
TOTAL PROTEIN: 6.7 G/DL (ref 6.4–8.3)
WBC # BLD: 4.9 E9/L (ref 4.5–11.5)

## 2020-04-24 PROCEDURE — 97530 THERAPEUTIC ACTIVITIES: CPT | Performed by: PHYSICAL THERAPIST

## 2020-04-24 PROCEDURE — 6370000000 HC RX 637 (ALT 250 FOR IP): Performed by: INTERNAL MEDICINE

## 2020-04-24 PROCEDURE — 85025 COMPLETE CBC W/AUTO DIFF WBC: CPT

## 2020-04-24 PROCEDURE — 97530 THERAPEUTIC ACTIVITIES: CPT

## 2020-04-24 PROCEDURE — 99238 HOSP IP/OBS DSCHRG MGMT 30/<: CPT | Performed by: FAMILY MEDICINE

## 2020-04-24 PROCEDURE — 80076 HEPATIC FUNCTION PANEL: CPT

## 2020-04-24 PROCEDURE — 6360000002 HC RX W HCPCS: Performed by: INTERNAL MEDICINE

## 2020-04-24 PROCEDURE — 80048 BASIC METABOLIC PNL TOTAL CA: CPT

## 2020-04-24 PROCEDURE — 36415 COLL VENOUS BLD VENIPUNCTURE: CPT

## 2020-04-24 PROCEDURE — 6370000000 HC RX 637 (ALT 250 FOR IP): Performed by: STUDENT IN AN ORGANIZED HEALTH CARE EDUCATION/TRAINING PROGRAM

## 2020-04-24 PROCEDURE — 97535 SELF CARE MNGMENT TRAINING: CPT

## 2020-04-24 PROCEDURE — 94640 AIRWAY INHALATION TREATMENT: CPT

## 2020-04-24 RX ORDER — FOLIC ACID 1 MG/1
1 TABLET ORAL DAILY
Qty: 30 TABLET | Refills: 3 | DISCHARGE
Start: 2020-04-25

## 2020-04-24 RX ORDER — ASPIRIN 81 MG/1
81 TABLET ORAL DAILY
Qty: 25 TABLET | Refills: 0 | DISCHARGE
Start: 2020-04-24 | End: 2020-01-01

## 2020-04-24 RX ADMIN — ARFORMOTEROL TARTRATE 15 MCG: 15 SOLUTION RESPIRATORY (INHALATION) at 08:30

## 2020-04-24 RX ADMIN — ENOXAPARIN SODIUM 40 MG: 40 INJECTION SUBCUTANEOUS at 08:09

## 2020-04-24 RX ADMIN — LEVOTHYROXINE SODIUM 25 MCG: 25 TABLET ORAL at 06:07

## 2020-04-24 RX ADMIN — FOLIC ACID 1 MG: 1 TABLET ORAL at 08:14

## 2020-04-24 RX ADMIN — PREDNISONE 2.5 MG: 5 TABLET ORAL at 08:09

## 2020-04-24 RX ADMIN — ALBUTEROL SULFATE 2.5 MG: 2.5 SOLUTION RESPIRATORY (INHALATION) at 11:24

## 2020-04-24 RX ADMIN — ESCITALOPRAM OXALATE 10 MG: 10 TABLET ORAL at 08:09

## 2020-04-24 RX ADMIN — ALBUTEROL SULFATE 2.5 MG: 2.5 SOLUTION RESPIRATORY (INHALATION) at 08:28

## 2020-04-24 RX ADMIN — BUDESONIDE 250 MCG: 0.25 SUSPENSION RESPIRATORY (INHALATION) at 08:30

## 2020-04-24 ASSESSMENT — PAIN SCALES - GENERAL: PAINLEVEL_OUTOF10: 0

## 2020-04-24 NOTE — PROGRESS NOTES
[facility]  Progress Note    Chief complaint :  Chief Complaint   Patient presents with    Fatigue     with difficulty walking       Subjective:    Patient has no complaints. Denies any fever, chills, fatigue, or weakness. Past medical, surgical, family and social history were reviewed, non-contributory, and unchanged unless otherwise stated. Review of Systems - as above     Objective:  BP (!) 160/92   Pulse 79   Temp 98.5 °F (36.9 °C) (Oral)   Resp 16   Ht 6' 3\" (1.905 m)   Wt 173 lb 6.4 oz (78.7 kg)   SpO2 96%   BMI 21.67 kg/m²     Physical Exam  Constitutional:       Appearance: He is well-developed. HENT:      Head: Normocephalic and atraumatic. Eyes:      General:         Right eye: No discharge. Left eye: No discharge. Conjunctiva/sclera: Conjunctivae normal.   Neck:      Musculoskeletal: Normal range of motion and neck supple. Trachea: No tracheal deviation. Cardiovascular:      Rate and Rhythm: Normal rate and regular rhythm. Heart sounds: Normal heart sounds. Pulmonary:      Effort: Pulmonary effort is normal. No respiratory distress. Breath sounds: Normal breath sounds. No wheezing. Abdominal:      General: Bowel sounds are normal. There is no distension. Palpations: Abdomen is soft. Tenderness: There is no abdominal tenderness. Musculoskeletal:         General: No tenderness. Skin:     General: Skin is warm and dry. Neurological:      General: No focal deficit present. Mental Status: He is alert.    Psychiatric:         Behavior: Behavior normal.          Labs:  Recent Results (from the past 24 hour(s))   Basic Metabolic Panel w/ Reflex to MG    Collection Time: 04/24/20  3:50 AM   Result Value Ref Range    Sodium 127 (L) 132 - 146 mmol/L    Potassium reflex Magnesium 3.8 3.5 - 5.0 mmol/L    Chloride 94 (L) 98 - 107 mmol/L    CO2 24 22 - 29 mmol/L    Anion Gap 9 7 - 16 mmol/L    Glucose 94 74 - 99 mg/dL    BUN 12 8 - 23 mg/dL    CREATININE 0.5 (L) 0.7 - 1.2 mg/dL    GFR Non-African American >60 >=60 mL/min/1.73    GFR African American >60     Calcium 8.3 (L) 8.6 - 10.2 mg/dL   Hepatic function panel    Collection Time: 04/24/20  3:50 AM   Result Value Ref Range    Total Protein 6.7 6.4 - 8.3 g/dL    Alb 2.7 (L) 3.5 - 5.2 g/dL    Alkaline Phosphatase 75 40 - 129 U/L    ALT 14 0 - 40 U/L    AST 32 0 - 39 U/L    Total Bilirubin 1.0 0.0 - 1.2 mg/dL    Bilirubin, Direct 0.3 0.0 - 0.3 mg/dL    Bilirubin, Indirect 0.7 0.0 - 1.0 mg/dL   CBC auto differential    Collection Time: 04/24/20  3:50 AM   Result Value Ref Range    WBC 4.9 4.5 - 11.5 E9/L    RBC 3.06 (L) 3.80 - 5.80 E12/L    Hemoglobin 11.0 (L) 12.5 - 16.5 g/dL    Hematocrit 31.9 (L) 37.0 - 54.0 %    .2 (H) 80.0 - 99.9 fL    MCH 35.9 (H) 26.0 - 35.0 pg    MCHC 34.5 32.0 - 34.5 %    RDW 13.5 11.5 - 15.0 fL    Platelets 217 456 - 021 E9/L    MPV 9.0 7.0 - 12.0 fL    Neutrophils % 54.0 43.0 - 80.0 %    Immature Granulocytes % 0.2 0.0 - 5.0 %    Lymphocytes % 22.8 20.0 - 42.0 %    Monocytes % 19.1 (H) 2.0 - 12.0 %    Eosinophils % 2.9 0.0 - 6.0 %    Basophils % 1.0 0.0 - 2.0 %    Neutrophils Absolute 2.63 1.80 - 7.30 E9/L    Immature Granulocytes # 0.01 E9/L    Lymphocytes Absolute 1.11 (L) 1.50 - 4.00 E9/L    Monocytes Absolute 0.93 0.10 - 0.95 E9/L    Eosinophils Absolute 0.14 0.05 - 0.50 E9/L    Basophils Absolute 0.05 0.00 - 0.20 E9/L       Radiology and other tests reviewed:  CT Head WO Contrast   Final Result   Diffuse atrophy likely age related   Findings compatible with small vessel ischemic changes. CT Cervical Spine WO Contrast   Final Result   No acute fracture or dislocation. Degenerative spondylotic changes   which are rather pronounced as outlined above. XR CHEST PORTABLE   Final Result   Pleural thickening and/or effusion on the right. XR PELVIS (1-2 VIEWS)   Final Result   No acute fracture or dislocation.

## 2020-04-24 NOTE — CARE COORDINATION
Social Work/Discharge Planning:  Received notification patient wants to discharge to another facility. Patient wife Jana Noble called and requested for this worker to meet with her  regarding snf. This worker met with patient for an extensive time and thoroughly explained the need for snf. Patient inquired about facility [address], which is [facility]. This worker called liaison for [facility] and facility has no beds available. Notified patient and he is agreeable to discharging to [facility] for rehab. Informed patient that this worker will notify his wife and he will likely discharge to facility today. Notified RN Marina Barnes. Called patient wife Jana Noble ([phone]) and notified her that her  is agreeable to [facility]. Jana Noble requested to be notified when her  discharges. Notified  and . Will continue to follow.   Electronically signed by ENEIDA Farrell on 4/24/2020 at 9:04 AM

## 2020-04-24 NOTE — PROGRESS NOTES
Occupational Therapy  OT BEDSIDE TREATMENT NOTE      Date:2020  Patient Name: Zach Donnelly  MRN: [id number]  : 1935  Room: [address] [place], [place] [address]     Referring Provider: Memo Barreto MD        Evaluating OT: [employer]     AM-PAC Daily Activity Raw Score:         Recommended Adaptive Equipment: TBD      Diagnosis: patient admitted secondary to fall   Surgery: none   Pertinent Medical History:    Past Medical History        Past Medical History:   Diagnosis Date    Anxiety      Asthma      [facility] ([facility]      Chronic sinusitis      Closed displaced midcervical fracture of left femur ([facility]     Complication of internal orthopedic device, initial encounter ([facility] 2018     Hardware not a problem, just in the way of fixation of broken hip, must be removed to insert hemiarthroplasty    Constipation, chronic      COPD      Degenerative joint disease of left knee 5/15/2018    Depression      Gastritis       hx of gastritis    Hearing loss      Hyperlipidemia      Hypertension      Hypothyroidism      Iron deficiency anemia      Left leg pain 7/15/2019    Prediabetes      Type 2 diabetes mellitus without complication (HCC)      Vitamin D deficiency           Precautions:  Falls     Home Living: Pt lives with spouse in one floor home. No further information provided.   Patient with decreased orientation   Bathroom setup: none noted   Equipment owned: none noted     Prior Level of Function: independent with ADLs , independent with IADLs; ambulated without devic3  Driving: does not note  Occupation: no     Pain Level: No c/o pain  Cognition: Alrt and conversing with limited safety awaraness               Functional Assessment:    Initial Eval Status  Date: 2020 Treatment Status  Date: 20 STGs = LTGs  Time frame: 5-7 days   Feeding Supervision        Grooming Minimal assist  Min A seated  Modified [place]    UB Dressing Minimal Assist  Mod A to manage Wallace Custard Modified [place]    LB Dressing Maximal Assist   Minimal Assist    Bathing Maximal Assist  UE: Min A  LE: Mod A Minimal Assist    Toileting Maximal Assist  Max A  Standing  Minimal Assist    Bed Mobility  Supine to sit: Maximal Assist   Sit to supine: Maximal Assist   Supine <> sit: SBA Supine to sit: Minimal Assist   Sit to supine: Minimal Assist    Functional Transfers Maximal Assist  STS: Max A from EOB and arm chair  Minimal Assist    Functional Mobility Maximal Assist  Mod A x2 using ww bed <> chair  Minimal Assist    Balance Sitting:     Static:  Stand by assist    Dynamic:minimal assist  Standing: maximum assist  Sitting: Sup  Standing: Max A     Activity Tolerance fair Fair  good           Comments: Upon arrival pt was supine in bed. At end of final session pt was transferred back to bed per request with alarm on, all lines and tubes intact and call light within reach. Treatment: Pt seen twice this AM.  Once for transfer to chair to eat breakfast and again after meal to facilitate ADLs. Durign second tx pt was found sitting on bed pan in karl therefore hygiene was performed while standing. When standing pt demoed B UE/ LE weakness and intermittent B knee buckling. Assistance provided to maneuver ww around turns. Education: ADL retraining and safe transfer techniques     · Pt has made good progress towards set goals.    · Continue with current plan of care      Treatment Charges: Mins Units   Ther Ex  32162     Manual Therapy 37557     Thera Activities 76451 9 1   ADL/Home Mgt [id number] 15 1   Neuro Re-ed 79477     Group Therapy      Orthotic manage/training  18264     Non-Billable Time     Total Timed Treatment 24 2       [address]O/L [id number]

## 2020-04-24 NOTE — PROGRESS NOTES
Physical Therapy  Facility/Department: [address] INTERNAL MEDICINE 2  Daily Treatment Note  NAME: Arjun Lucero  : 1935  MRN: [id number]    Date of Service: 2020       REQUIRES PT FOLLOW UP: Yes       Patient Diagnosis(es): The primary encounter diagnosis was Hyponatremia. Diagnoses of General weakness and Recurrent falls were also pertinent to this visit.      has a past medical history of Anxiety, Asthma, Bladder cancer ([facility] Chronic sinusitis, Closed displaced midcervical fracture of left femur ([facility] Complication of internal orthopedic device, initial encounter ([facility] Constipation, chronic, COPD, Degenerative joint disease of left knee, Depression, Gastritis, Hearing loss, Hyperlipidemia, Hypertension, Hypothyroidism, Iron deficiency anemia, Left leg pain, Prediabetes, Type 2 diabetes mellitus without complication ([facility] and Vitamin D deficiency. has a past surgical history that includes Upper gastrointestinal endoscopy; Colonoscopy; Femur fracture surgery (2010); fracture surgery; and pr partial hip replacement (Left, 2018).    Evaluating Therapist: Peggy Somers, PT      Referring Provider:  Lulu Lynch MD     Room #: 421   DIAGNOSIS:  Hyponatremia      PRECAUTIONS: falls      Social:  Pt lives with  wife  in a  1  floor plan . Pt a poor historian   Prior to admission pt walked with  Ww? ?        Initial Evaluation  Date:  2020 Treatment       Short Term/ Long Term   Goals   Was pt agreeable to Eval/treatment?   yes   yes     Does pt have pain?  none reported  No c/o       Bed Mobility  Rolling:  NT   Supine to sit:  Max assist   Sit to supine:  NT   Scooting:  Max assist   supine to sit SBA  Sit to supine min A   min assist    Transfers Sit to stand: max assist   Stand to sit:  max assist   Stand pivot:  Max assist   sit to stand max A   Stand to sit max A x 2  Stand pivot mod A x 2  min assist    Ambulation     4  feet with  ww  with  Max assist   4-5 steps from bed to

## 2020-04-25 LAB — METHYLMALONIC ACID: 0.17 UMOL/L (ref 0–0.4)

## 2020-05-20 LAB
ALBUMIN SERPL-MCNC: 3 G/DL
ALP BLD-CCNC: 96 U/L
ALT SERPL-CCNC: 29 U/L
ANION GAP SERPL CALCULATED.3IONS-SCNC: 15 MMOL/L
AST SERPL-CCNC: 56 U/L
BASOPHILS ABSOLUTE: 0.04 /ΜL
BASOPHILS RELATIVE PERCENT: 1.1 %
BILIRUB SERPL-MCNC: 0.7 MG/DL (ref 0.1–1.4)
BUN BLDV-MCNC: 11 MG/DL
CALCIUM SERPL-MCNC: 8.9 MG/DL
CHLORIDE BLD-SCNC: 110 MMOL/L
CHOLESTEROL, TOTAL: 170 MG/DL
CHOLESTEROL/HDL RATIO: 3
CO2: 18 MMOL/L
CREAT SERPL-MCNC: 0.6 MG/DL
EOSINOPHILS ABSOLUTE: 0.25 /ΜL
EOSINOPHILS RELATIVE PERCENT: 6.6 %
GFR CALCULATED: NORMAL
GLUCOSE BLD-MCNC: 78 MG/DL
HCT VFR BLD CALC: 36.2 % (ref 41–53)
HDLC SERPL-MCNC: 56 MG/DL (ref 35–70)
HEMOGLOBIN: 12.1 G/DL (ref 13.5–17.5)
LDL CHOLESTEROL CALCULATED: 104 MG/DL (ref 0–160)
LYMPHOCYTES ABSOLUTE: 1.37 /ΜL
LYMPHOCYTES RELATIVE PERCENT: 36.1 %
MCH RBC QN AUTO: 34.9 PG
MCHC RBC AUTO-ENTMCNC: 33.4 G/DL
MCV RBC AUTO: 104.3 FL
MONOCYTES ABSOLUTE: 0.67 /ΜL
MONOCYTES RELATIVE PERCENT: 17.6 %
NEUTROPHILS ABSOLUTE: 1.45 /ΜL
NEUTROPHILS RELATIVE PERCENT: 38.1 %
PDW BLD-RTO: 13.8 %
PLATELET # BLD: 175 K/ΜL
PMV BLD AUTO: 10.1 FL
POTASSIUM SERPL-SCNC: 4.7 MMOL/L
RBC # BLD: 3.47 10^6/ΜL
SODIUM BLD-SCNC: 138 MMOL/L
T4 TOTAL: 10.2
TOTAL PROTEIN: 7.4
TRIGL SERPL-MCNC: 48 MG/DL
TSH SERPL DL<=0.05 MIU/L-ACNC: 2.25 UIU/ML
VLDLC SERPL CALC-MCNC: NORMAL MG/DL
WBC # BLD: 3.8 10^3/ML

## 2020-06-30 PROBLEM — I10 ESSENTIAL HYPERTENSION: Chronic | Status: ACTIVE | Noted: 2020-01-01

## 2020-06-30 PROBLEM — I10 ESSENTIAL HYPERTENSION: Status: ACTIVE | Noted: 2020-01-01

## 2020-06-30 NOTE — PROGRESS NOTES
PROAIR  (90 Base) MCG/ACT inhaler, , Disp: , Rfl:   Allergies   Allergen Reactions    Cephalexin     Sulfa Antibiotics      Social History     Socioeconomic History    Marital status:      Spouse name: Not on file    Number of children: Not on file    Years of education: Not on file    Highest education level: Not on file   Occupational History    Occupation: retired     Comment: self employed   Social Needs    Financial resource strain: Not on file    Food insecurity     Worry: Not on file     Inability: Not on file   [employer] needs     Medical: Not on file     Non-medical: Not on file   Tobacco Use    Smoking status: Former Smoker     Years: 20.00     Types: Cigars    Smokeless tobacco: Never Used    Tobacco comment: quit smoking 40 years ago   Substance and Sexual Activity    Alcohol use: Not on file    Drug use: No    Sexual activity: Not Currently     Partners: Female   Lifestyle    Physical activity     Days per week: Not on file     Minutes per session: Not on file    Stress: Not on file   Relationships    Social connections     Talks on phone: Not on file     Gets together: Not on file     Attends Sabianism service: Not on file     Active member of club or organization: Not on file     Attends meetings of clubs or organizations: Not on file     Relationship status: Not on file    Intimate partner violence     Fear of current or ex partner: Not on file     Emotionally abused: Not on file     Physically abused: Not on file     Forced sexual activity: Not on file   Other Topics Concern    Not on file   Social History Narrative        COPD    TICS    COLON POLYP    HTN    LIPID    [place]    ANX/DEP    EARLY HYPOTHYROID    EARLY DIET DM    COLON 12-99    TMT 1-06    CA BLADDER---PICHETTE----DR BRYANT    BPH    EGD 12-05 MATT    FRACTURE L FEMUR SHAFT 4-10    L KNEE OR 12-10 TO CLEAR OUT INFECTION-DR ECKERT--LIFELONG DOXY BID    [employer] CLUB EVERY DAY AND THREE [employer] BEERS WHEN HE GETS HOME TOLD TO ME BY    WIFE 12-13    PARALYSIS DIAPHRAGM WITH DR HUMMEL 9-15----THEN DR VILLALOBOS    MOTHER  AT 97    DAD  AT 79 CAD    VIT D DEFIC 10-    HYPOTHYROID 17    HAS 4 CA    PULM DR VELEZ    RHEUM DR NEWTON    ONE BEER AT [employer] DAILY AND TWO AT HOME DAILY    FRAC LEFT FEMUR  DR ECKERT----OLD HARDWARE OUT      Past Medical History:   Diagnosis Date    Anxiety     Asthma     [facility]     Chronic sinusitis     Closed displaced midcervical fracture of left femur ([facility]     Complication of internal orthopedic device, initial encounter ([facility] 2018    Hardware not a problem, just in the way of fixation of broken hip, must be removed to insert hemiarthroplasty    Constipation, chronic     COPD     Degenerative joint disease of left knee 5/15/2018    Depression     Gastritis     hx of gastritis    Hearing loss     Hyperlipidemia     Hypertension     Hypothyroidism     Iron deficiency anemia     Left leg pain 7/15/2019    Prediabetes     [facility]     Vitamin D deficiency      Family History   Problem Relation Age of Onset    Arthritis Mother     Cancer Mother         glaucoma    Heart Disease Father     Coronary Art Dis Father     Emphysema Father     Diabetes Other     Arthritis Son       Past Surgical History:   Procedure Laterality Date    COLONOSCOPY      FEMUR FRACTURE SURGERY  2010    IM nailing left femoral shaft    FRACTURE SURGERY      DC PARTIAL HIP REPLACEMENT Left 2018    HIP HEMIARTHROPLASTY LEFT, WITH  REMOVAL OF PREVIOUS HARDWARE, INSERTION OF MEDICATION DELIVERY SYSTEM (WITH INTRAOPERATIVE FLUOROSCOPY) performed by Rupesh Castillo MD at [facility] [address] ENDOSCOPY      10/10      Vitals:    20 1312   BP: 132/74   Temp: 98.1 °F (36.7 °C)   Weight: 188 lb (85.3 kg)       Objective:    Physical Exam  Vitals signs reviewed.

## 2020-08-20 NOTE — PROGRESS NOTES
Camila Herrera is a 80 y.o. male evaluated via telephone on 8/20/2020. Consent:  He and/or health care decision maker is aware that that he may receive a bill for this telephone service, depending on his insurance coverage, and has provided verbal consent to proceed: Yes      Documentation:  I communicated with the patient and/or health care decision maker about cough. Details of this discussion including any medical advice provided: Patient requested virtual visit to discuss cough congestion sinus for the past 10 days. Occasional chill. They both him and his wife visible on the virtual visit. I strongly advised him go to the [facility] flu clinic right now we gave them directions and the phone number and they are going to go there directly. I affirm this is a Patient Initiated Episode with a Patient who has not had a related appointment within my department in the past 7 days or scheduled within the next 24 hours.     Patient identification was verified at the start of the visit: Yes    Total Time: minutes: 5-10 minutes    Note: not billable if this call serves to triage the patient into an appointment for the relevant concern      Nuzhat Vazquez

## 2020-08-20 NOTE — TELEPHONE ENCOUNTER
Pt has a cough and sinus drainage congestion, he is not able to do a video visit.  Should go to flu clinic ? [phone]

## 2020-08-27 PROBLEM — I34.0 NONRHEUMATIC MITRAL VALVE REGURGITATION: Status: ACTIVE | Noted: 2020-01-01

## 2020-08-27 NOTE — PROGRESS NOTES
20  Name: Douglas Luke    : 1935    Sex: male    Age: 80 y.o. Subjective:  Chief Complaint: Patient is here for cough     For months but worse  Last three weeks---wife in room   We did virtual  No   tmep chuills  No cp or sob  Wife says was overusing hte   Rescue  inahelr      A nd he cut back  Pt and wife  Say he is nto allerigc  To keflex      Review of Systems   Constitutional: Negative. HENT: Negative. Eyes: Negative. Respiratory: Positive for cough. Negative for shortness of breath. Cardiovascular: Negative. Negative for chest pain, palpitations and leg swelling. Gastrointestinal: Negative. Endocrine: Negative. Genitourinary: Negative. Musculoskeletal: Positive for arthralgias and back pain. Skin: Negative. Allergic/Immunologic: Negative. Neurological: Negative. Hematological: Negative. Psychiatric/Behavioral: Negative. Current Outpatient Medications:     predniSONE (DELTASONE) 10 MG tablet, ONE TID FOR THREE DAYS, ONE BID FOR THREE DAYS, ONE QD FOR THREE DAYS   FOOD, Disp: 18 tablet, Rfl: 0    cefdinir (OMNICEF) 300 MG capsule, Take 1 capsule by mouth 2 times daily for 10 days Ok with keflex sensitivity, Disp: 20 capsule, Rfl: 0    doxycycline hyclate (VIBRA-TABS) 100 MG tablet, Take 1 tablet by mouth 2 times daily, Disp: 180 tablet, Rfl: 5    aspirin EC 81 MG EC tablet, Take 1 tablet by mouth daily for 25 days, Disp: 25 tablet, Rfl: 0    hydrocortisone 2.5 % cream, Apply topically 2 times daily. , Disp: 2 Tube, Rfl: 12    folic acid (FOLVITE) 1 MG tablet, Take 1 tablet by mouth daily, Disp: 30 tablet, Rfl: 3    vitamin D (ERGOCALCIFEROL) 1.25 MG (27020 UT) CAPS capsule, Take 1 capsule by mouth once a week, Disp: 12 capsule, Rfl: 5    escitalopram (LEXAPRO) 10 MG tablet, Take 1 tablet by mouth daily, Disp: 90 tablet, Rfl: 5    levothyroxine (SYNTHROID) 25 MCG tablet, Take 1 tablet by mouth daily, Disp: 90 tablet, Rfl: 5   BLADDER---PICHETTE---- [facility]    BPH    EGD  MATT    FRACTURE L FEMUR SHAFT 4-10    L KNEE OR 12-10 TO CLEAR OUT INFECTION-DR ECKERT--LIFELONG DOXY BID    [employer] CLUB EVERY DAY AND THREE [employer] BEERS WHEN HE GETS HOME TOLD TO ME BY    WIFE 12-    PARALYSIS DIAPHRAGM WITH DR HUMMEL 9-15----THEN DR VILLALOBOS    MOTHER  AT 97    DAD  AT 79 CAD    VIT D DEFIC 10-16    HYPOTHYROID -17    HAS 4 CA    PULM DR VELEZ    RHEUM DR [place]    ONE BEER AT [employer] DAILY AND TWO AT HOME DAILY    FRAC LEFT FEMUR  DR ECKERT----OLD HARDWARE OUT      Past Medical History:   Diagnosis Date    Anxiety     Asthma     Bladder cancer ([address]     Chronic sinusitis     Closed displaced midcervical fracture of left femur ([address]     Complication of internal orthopedic device, initial encounter ([facility] 2018    Hardware not a problem, just in the way of fixation of broken hip, must be removed to insert hemiarthroplasty    Constipation, chronic     COPD     Degenerative joint disease of left knee 5/15/2018    Depression     Gastritis     hx of gastritis    Hearing loss     Hyperlipidemia     Hypertension     Hypothyroidism     Iron deficiency anemia     Left leg pain 7/15/2019    Prediabetes     Type 2 diabetes mellitus without complication ([address]     Vitamin D deficiency      Family History   Problem Relation Age of Onset    Arthritis Mother     Cancer Mother         glaucoma    Heart Disease Father     Coronary Art Dis Father     Emphysema Father     Diabetes Other     Arthritis Son       Past Surgical History:   Procedure Laterality Date    COLONOSCOPY      FEMUR FRACTURE SURGERY  2010    IM nailing left femoral shaft    FRACTURE SURGERY      SC PARTIAL HIP REPLACEMENT Left 2018    HIP HEMIARTHROPLASTY LEFT, WITH  REMOVAL OF PREVIOUS HARDWARE, INSERTION OF MEDICATION DELIVERY SYSTEM (WITH INTRAOPERATIVE FLUOROSCOPY) performed by Isabel Velasquez MD at [address] [place] [address] GASTROINTESTINAL ENDOSCOPY      10/10      Vitals:    08/27/20 1346   BP: 127/78   Temp: 98 °F (36.7 °C)   TempSrc: Temporal   Weight: 185 lb (83.9 kg)       Objective:    Physical Exam  Vitals signs reviewed. Constitutional:       Appearance: He is well-developed. HENT:      Head: Normocephalic. Eyes:      Pupils: Pupils are equal, round, and reactive to light. Neck:      Musculoskeletal: Normal range of motion. Cardiovascular:      Rate and Rhythm: Normal rate and regular rhythm. Heart sounds: Murmur (DIANE 2) present. Pulmonary:      Effort: Pulmonary effort is normal.      Breath sounds: Wheezing (faint wheezing) present. Abdominal:      Palpations: Abdomen is soft. Musculoskeletal:      Comments: Marked  D ec rom both knees   Skin:     General: Skin is warm. Neurological:      Mental Status: He is alert and oriented to person, place, and time. Psychiatric:         Behavior: Behavior normal.         Maxine Nunez was seen today for cough. Diagnoses and all orders for this visit:    Essential hypertension    COPD without exacerbation ([address]    Nonrheumatic mitral valve regurgitation  -     ECHO Limited; Future    Primary osteoarthritis involving multiple joints    Acute bronchitis due to other specified organisms  -     predniSONE (DELTASONE) 10 MG tablet; ONE TID FOR THREE DAYS, ONE BID FOR THREE DAYS, ONE QD FOR THREE DAYS   FOOD  -     cefdinir (OMNICEF) 300 MG capsule; Take 1 capsule by mouth 2 times daily for 10 days Ok with keflex sensitivity  -     XR CHEST (2 VW); Future        Comments: has a new heart murmur and will do  Echo  And  cxr      cont inahlers       med  Ab  Steroid   Worse to er A great deal of time spent reviewing medications, diet, exercise, social issues. Also reviewing the chart before entering the room with patient and finishing charting after leaving patient's room.  More than half of that time was spent face to face with the patient in counseling and coordinating

## 2020-09-01 NOTE — TELEPHONE ENCOUNTER
Let wife know that the chest x-ray shows a little fluid in the right base lung and may be a little pneumonia or maybe scar tissue. See me in 2 weeks we will see if the cough is better. May need referral to pulmonary.   If it gets worse let me know

## 2020-09-17 NOTE — PROGRESS NOTES
Medicare Annual Wellness Visit  Name: Anibal Dennis Date: 2020   MRN: [id number] Sex: Male   Age: 80 y.o. Ethnicity: Non-/Non    : 1935 Race: Pietro Blandon is here for Medicare AWV    Re     Ck  Re breathign   Feels  tosn beter   cx [address] pl err   nto hear on  Echo yet      wif ein room      emds  reivwed      cug  Gone      Screenings for behavioral, psychosocial and functional/safety risks, and cognitive dysfunction are all negative except as indicated below. These results, as well as other patient data from the [address] form, are documented in Flowsheets linked to this Encounter. Allergies   Allergen Reactions    Cephalexin     Sulfa Antibiotics          Prior to Visit Medications    Medication Sig Taking? Authorizing Provider   predniSONE (DELTASONE) 10 MG tablet ONE TID FOR THREE DAYS, ONE BID FOR THREE DAYS, ONE QD FOR THREE DAYS   FOOD  Abhijeet Wadsworth DO   doxycycline hyclate (VIBRA-TABS) 100 MG tablet Take 1 tablet by mouth 2 times daily  Abhijeet Wadsworth DO   aspirin EC 81 MG EC tablet Take 1 tablet by mouth daily for 25 days  Johana Mclaughlin,    hydrocortisone 2.5 % cream Apply topically 2 times daily.   Clay Mai,    folic acid (FOLVITE) 1 MG tablet Take 1 tablet by mouth daily  Johana Mclaughlin DO   vitamin D (ERGOCALCIFEROL) 1.25 MG (61020 UT) CAPS capsule Take 1 capsule by mouth once a week  Abhijeet Wadsworth DO   escitalopram (LEXAPRO) 10 MG tablet Take 1 tablet by mouth daily  Abhijeet Wadsworth DO   levothyroxine (SYNTHROID) 25 MCG tablet Take 1 tablet by mouth daily  Abhijeet Wadsworth DO   SYMBICORT 80-4.5 MCG/ACT AERO Inhale 2 puffs into the lungs 2 times daily  Abhijeet Wadsworth DO   predniSONE (DELTASONE) 2.5 MG tablet Take 1 tablet by mouth daily  Abhijeet Wadsworth DO   PROAIR  (90 Base) MCG/ACT inhaler   Historical Provider, MD         Past Medical History:   Diagnosis Date    Anxiety     Asthma  Bladder [facility]     Chronic sinusitis     Closed displaced midcervical fracture of left femur ([facility] [address] 2/18/2693    [facility]/2018    Hardware not a problem, just in the way of fixation of broken hip, must be removed to insert hemiarthroplasty    Constipation, chronic     COPD     Degenerative joint disease of left knee 5/15/2018    Depression     Gastritis     hx of gastritis    Hearing loss     Hyperlipidemia     Hypertension     Hypothyroidism     Iron deficiency anemia     Left leg pain 7/15/2019    Prediabetes     Type 2 diabetes mellitus without complication (HCC)     Vitamin D deficiency        Past Surgical History:   Procedure Laterality Date    COLONOSCOPY      FEMUR FRACTURE SURGERY  04/12/2010    IM nailing left femoral shaft    FRACTURE SURGERY      DE PARTIAL HIP REPLACEMENT Left 5/14/2018    HIP HEMIARTHROPLASTY LEFT, WITH  REMOVAL OF PREVIOUS HARDWARE, INSERTION OF MEDICATION DELIVERY SYSTEM (WITH INTRAOPERATIVE FLUOROSCOPY) performed by Katherine Green MD at [address] [place] [address]      10/10         Family History   Problem Relation Age of Onset    Arthritis Mother     Cancer Mother         glaucoma    Heart Disease Father     Coronary Art Dis Father     Emphysema Father     Diabetes Other     Arthritis Son        CareTeam (Including outside providers/suppliers regularly involved in providing care):   Patient Care Team:  Ulysses Favorite, DO as PCP - General (Family Medicine)  Ulysses Favorite, DO as PCP - [facility] Empaneled Provider    Wt Readings from Last 3 Encounters:   09/17/20 186 lb (84.4 kg)   08/27/20 185 lb (83.9 kg)   06/30/20 188 lb (85.3 kg)     Vitals:    09/17/20 1404   BP: 134/82   Temp: 98.1 °F (36.7 °C)   Weight: 186 lb (84.4 kg)     Body mass index is 23.25 kg/m².     Based upon direct observation of the patient, evaluation of cognition reveals recent and remote memory vaccine (1) 09/01/2020    TSH testing  05/20/2021    Potassium monitoring  05/20/2021    Creatinine monitoring  05/20/2021    Hepatitis A vaccine  Aged Out    Hib vaccine  Aged Out    Meningococcal (ACWY) vaccine  Aged Out     Recommendations for Risk Management Solution Due: see orders and patient instructions/AVS.  . Recommended screening schedule for the next 5-10 years is provided to the patient in written form: see Patient Annemarie Noguera was seen today for medicare aw. Diagnoses and all orders for this visit:    Routine general medical examination at a [facility] facility    Pulmonary emphysema, unspecified emphysema type ([facility]    Essential hypertension    Need for prophylactic vaccination against Streptococcus pneumoniae (pneumococcus)  -     Pneumococcal polysaccharide vaccine 23-valent PPSV23            Await  Echo  Maury lif  Sx     Doing well now  A great deal of time spent reviewing medications, diet, exercise, social issues. Also reviewing the chart before entering the room with patient and finishing charting after leaving patient's room. More than half of that time was spent face to face with the patient in counseling and coordinating care.

## 2020-09-17 NOTE — PATIENT INSTRUCTIONS
Personalized Preventive Plan for Douglas Luke - 9/17/2020  Medicare offers a range of preventive health benefits. Some of the tests and screenings are paid in full while other may be subject to a deductible, co-insurance, and/or copay. Some of these benefits include a comprehensive review of your medical history including lifestyle, illnesses that may run in your family, and various assessments and screenings as appropriate. After reviewing your medical record and screening and assessments performed today your provider may have ordered immunizations, labs, imaging, and/or referrals for you. A list of these orders (if applicable) as well as your Preventive Care list are included within your After Visit Summary for your review. Other Preventive Recommendations:    · A preventive eye exam performed by an eye specialist is recommended every 1-2 years to screen for glaucoma; cataracts, macular degeneration, and other eye disorders. · A preventive dental visit is recommended every 6 months. · Try to get at least 150 minutes of exercise per week or 10,000 steps per day on a pedometer . · Order or download the FREE \"Exercise & Physical Activity: Your Everyday Guide\" from [employer]. Call 0-411.852.5731 or search [employer] online. · You need 4691-7662 mg of calcium and 8477-7550 IU of vitamin D per day. It is possible to meet your calcium requirement with diet alone, but a vitamin D supplement is usually necessary to meet this goal.  · When exposed to the sun, use a sunscreen that protects against both UVA and UVB radiation with an SPF of 30 or greater. Reapply every 2 to 3 hours or after sweating, drying off with a towel, or swimming. · Always wear a seat belt when traveling in a car. Always wear a helmet when riding a bicycle or motorcycle.

## 2020-10-16 NOTE — PROGRESS NOTES
10/16/20  Name: Bj Delaney    : 1935    Sex: male    Age: 80 y.o. Subjective:  Chief Complaint: Patient is here for cough     pme week   With  Dry cough     Sim to peggyut  He rwith wfie  No cp ro sbo  No  Tem pchills    No vhg taste           Review of Systems   Constitutional: Negative. Negative for chills, diaphoresis, fatigue and fever. HENT: Positive for congestion. Negative for trouble swallowing. Respiratory: Positive for cough (productive with yellow mucus). Negative for apnea, chest tightness, shortness of breath, wheezing and stridor. No temperature or sweats or chills   Cardiovascular: Negative. Current Outpatient Medications:     doxycycline hyclate (VIBRA-TABS) 100 MG tablet, Take 1 tablet by mouth 2 times daily for 10 days, Disp: 20 tablet, Rfl: 0    methylPREDNISolone (MEDROL DOSEPACK) 4 MG tablet, Take by mouth.---continue taking the chronic prednisone with this med, Disp: 1 kit, Rfl: 0    doxycycline hyclate (VIBRA-TABS) 100 MG tablet, Take 1 tablet by mouth 2 times daily, Disp: 180 tablet, Rfl: 5    aspirin EC 81 MG EC tablet, Take 1 tablet by mouth daily for 25 days, Disp: 25 tablet, Rfl: 0    hydrocortisone 2.5 % cream, Apply topically 2 times daily. , Disp: 2 Tube, Rfl: 12    folic acid (FOLVITE) 1 MG tablet, Take 1 tablet by mouth daily, Disp: 30 tablet, Rfl: 3    vitamin D (ERGOCALCIFEROL) 1.25 MG (12405 UT) CAPS capsule, Take 1 capsule by mouth once a week, Disp: 12 capsule, Rfl: 5    escitalopram (LEXAPRO) 10 MG tablet, Take 1 tablet by mouth daily, Disp: 90 tablet, Rfl: 5    levothyroxine (SYNTHROID) 25 MCG tablet, Take 1 tablet by mouth daily, Disp: 90 tablet, Rfl: 5    SYMBICORT 80-4.5 MCG/ACT AERO, Inhale 2 puffs into the lungs 2 times daily, Disp: 1 Inhaler, Rfl: 3    predniSONE (DELTASONE) 2.5 MG tablet, Take 1 tablet by mouth daily, Disp: 90 tablet, Rfl: 11    PROAIR  (90 Base) MCG/ACT inhaler, , Disp: , Rfl:   Allergies WITH DR HUMMEL 9-15----THEN DR VILLALOBOS    MOTHER  AT 89    DAD  AT 79 CAD    VIT D DEFIC 10-    HYPOTHYROID 2-17    HAS 4 CA    PULM DR VELEZ    RHEUM DR NEWTON    ONE BEER AT [facility] DAILY AND TWO AT HOME DAILY    FRAC LEFT FEMUR  DR ECKERT----OLD HARDWARE OUT      Past Medical History:   Diagnosis Date    Anxiety     Asthma     Bladder cancer ([facility]     Chronic sinusitis     Closed displaced midcervical fracture of left femur ([facility]     Complication of internal orthopedic device, initial encounter ([facility] 2018    Hardware not a problem, just in the way of fixation of broken hip, must be removed to insert hemiarthroplasty    Constipation, chronic     COPD     Degenerative joint disease of left knee 5/15/2018    Depression     Gastritis     hx of gastritis    Hearing loss     Hyperlipidemia     Hypertension     Hypothyroidism     Iron deficiency anemia     Left leg pain 7/15/2019    Prediabetes     Type 2 diabetes mellitus without complication ([facility]     Vitamin D deficiency      Family History   Problem Relation Age of Onset    Arthritis Mother     Cancer Mother         glaucoma    Heart Disease Father     Coronary Art Dis Father     Emphysema Father     Diabetes Other     Arthritis Son       Past Surgical History:   Procedure Laterality Date    COLONOSCOPY      FEMUR FRACTURE SURGERY  2010    IM nailing left femoral shaft    FRACTURE SURGERY      CA PARTIAL HIP REPLACEMENT Left 2018    HIP HEMIARTHROPLASTY LEFT, WITH  REMOVAL OF PREVIOUS HARDWARE, INSERTION OF MEDICATION DELIVERY SYSTEM (WITH INTRAOPERATIVE FLUOROSCOPY) performed by Jaquelin Curry MD at [facility] ENDOSCOPY      10/10      Vitals:    10/16/20 1403   Pulse: 73   Resp: 14   Temp: 98.2 °F (36.8 °C)   TempSrc: Temporal   SpO2: 96%   Weight: 184 lb (83.5 kg)       Objective:    Physical Exam  Cardiovascular:      Rate and Rhythm: Normal rate and regular rhythm. Pulses: Normal pulses. Heart sounds: Normal heart sounds. Pulmonary:      Effort: Pulmonary effort is normal.      Breath sounds: Normal breath sounds. No stridor. No wheezing or rales. Gabriela Garcias was seen today for cough, shortness of breath and wheezing. Diagnoses and all orders for this visit:    Acute non-recurrent sinusitis of other sinus    COPD without exacerbation (HCC)    Acute bronchitis, unspecified organism  -     doxycycline hyclate (VIBRA-TABS) 100 MG tablet; Take 1 tablet by mouth 2 times daily for 10 days  -     methylPREDNISolone (MEDROL DOSEPACK) 4 MG tablet; Take by mouth.---continue taking the chronic prednisone with this med  -     methylPREDNISolone acetate (DEPO-MEDROL) injection 40 mg        Comments: med  Not great see  Worse to er      Not  gret Monday  See me  A great deal of time spent reviewing medications, diet, exercise, social issues. Also reviewing the chart before entering the room with patient and finishing charting after leaving patient's room. More than half of that time was spent face to face with the patient in counseling and coordinating care. Follow Up: Return if symptoms worsen or fail to improve.      Seen by:  Latisha Lee,

## 2020-11-05 NOTE — TELEPHONE ENCOUNTER
SCHEDULED ECHO FOR 11-23-20. REVIEWED COVID CHECKLIST WITH PATIENT.     Electronically signed by Christella Sever on 11/5/2020 at 1:55 PM

## 2020-11-17 NOTE — PROGRESS NOTES
20  Name: Raheel Dominguez    : 1935    Sex: male    Age: 80 y.o. Subjective:  Chief Complaint: Patient is here for  cough     Patient was seen in the flu clinic alone but I did discuss the case with the wife afterwards. He complains of a cough for the last 2 to 3 weeks. He says he always has a cough. He is short of breath with exertion but he states is no worse than normal.  No chills or temperature. No change in taste. No chest pain. Review of Systems   Constitutional: Negative. Negative for chills, diaphoresis, fatigue and fever. HENT: Negative. Eyes: Negative. Respiratory: Positive for cough and shortness of breath. Negative for stridor. Cardiovascular: Negative. Negative for chest pain and leg swelling. Gastrointestinal: Negative. Endocrine: Negative. Genitourinary: Negative. Skin: Negative. Allergic/Immunologic: Negative. Hematological: Negative. Psychiatric/Behavioral: Negative. Current Outpatient Medications:     azithromycin (ZITHROMAX) 250 MG tablet, Take 1 tablet by mouth See Admin Instructions for 5 days 500mg on day 1 followed by 250mg on days 2 - 5, Disp: 6 tablet, Rfl: 0    fluticasone-salmeterol (ADVAIR DISKUS) 500-50 MCG/DOSE diskus inhaler, Inhale 1 puff into the lungs every 12 hours, Disp: 60 each, Rfl: 3    albuterol (PROVENTIL) (2.5 MG/3ML) 0.083% nebulizer solution, Take 3 mLs by nebulization every 6 hours as needed for Wheezing, Disp: 120 each, Rfl: 3    methylPREDNISolone (MEDROL DOSEPACK) 4 MG tablet, Take by mouth.---continue taking the chronic prednisone with this med, Disp: 1 kit, Rfl: 0    doxycycline hyclate (VIBRA-TABS) 100 MG tablet, Take 1 tablet by mouth 2 times daily, Disp: 180 tablet, Rfl: 5    aspirin EC 81 MG EC tablet, Take 1 tablet by mouth daily for 25 days, Disp: 25 tablet, Rfl: 0    hydrocortisone 2.5 % cream, Apply topically 2 times daily. , Disp: 2 Tube, Rfl: 12    folic acid (FOLVITE) 1 MG History:   Procedure Laterality Date    COLONOSCOPY      FEMUR FRACTURE SURGERY  04/12/2010    IM nailing left femoral shaft    FRACTURE SURGERY      OR PARTIAL HIP REPLACEMENT Left 5/14/2018    HIP HEMIARTHROPLASTY LEFT, WITH  REMOVAL OF PREVIOUS HARDWARE, INSERTION OF MEDICATION DELIVERY SYSTEM (WITH INTRAOPERATIVE FLUOROSCOPY) performed by Nadir Mtason MD at [facility]      10/10      Vitals:    11/17/20 1603   BP: 135/82   Pulse: 113   Resp: 18   Temp: 98.7 °F (37.1 °C)   TempSrc: Temporal   SpO2: (!) 84%   Weight: 184 lb (83.5 kg)       Objective:    Physical Exam  Vitals signs reviewed. Constitutional:       Appearance: He is well-developed. HENT:      Head: Normocephalic. Eyes:      Pupils: Pupils are equal, round, and reactive to light. Neck:      Musculoskeletal: Normal range of motion. Cardiovascular:      Rate and Rhythm: Normal rate and regular rhythm. Pulmonary:      Effort: Pulmonary effort is normal. No respiratory distress. Breath sounds: No stridor. Rhonchi present. No wheezing or rales. Chest:      Chest wall: No tenderness. Abdominal:      Palpations: Abdomen is soft. Skin:     General: Skin is warm. Neurological:      Mental Status: He is alert and oriented to person, place, and time. Psychiatric:         Behavior: Behavior normal.         Shanika Anaya was seen today for cough, shortness of breath and other. Diagnoses and all orders for this visit:    [id number]  -     COVID-19 Ambulatory; Future  -     azithromycin (ZITHROMAX) 250 MG tablet; Take 1 tablet by mouth See Admin Instructions for 5 days 500mg on day 1 followed by 250mg on days 2 - 5  -     fluticasone-salmeterol (ADVAIR DISKUS) 500-50 MCG/DOSE diskus inhaler; Inhale 1 puff into the lungs every 12 hours  -     albuterol (PROVENTIL) (2.5 MG/3ML) 0.083% nebulizer solution;  Take 3 mLs by nebulization every 6 hours as needed for Wheezing    Acute bronchitis due to other specified organisms    COPD without exacerbation ([facility]    Essential hypertension        Comments: His oxygen level recovered at rest to 93%. On discussion with the patient I highly encouraged him go to the emergency room he refuses. We will treat as best I can. He had does have a nebulizer at home but has not been using it and he says the medication is . I did help the patient to the car with his walker and help him get in the car. I discussed with the wife the situation she was aware that I want him to go the hospital and she says he is very stubborn. But I advised if he got any worse to take him to ER otherwise see me here in 2 days. A great deal of time spent reviewing medications, diet, exercise, social issues. Also reviewing the chart before entering the room with patient and finishing charting after leaving patient's room. More than half of that time was spent face to face with the patient in counseling and coordinating care. Follow Up: Return in about 2 days (around 2020).      Seen by:  Nirav Mcallister,

## 2020-11-25 NOTE — TELEPHONE ENCOUNTER
11/25/20 spoke with  at [facility] and she said the girl who does the scheduling was not in and she will leave her a message to look at the referral. [facility] said they may be able to get him in on Monday 11/30.

## 2020-11-25 NOTE — TELEPHONE ENCOUNTER
Wife notified. Asked that I let you know patient is doing better.   Congestion/cough seem to be much better

## 2020-11-25 NOTE — TELEPHONE ENCOUNTER
Evelin Han @ [facility] called. Has been unsuccessful in reaching pt to schedule appt. I spoke to Mrs Lepe Just and gave her number to call for appt. She said if patient doesn't recognize the incoming number he just hangs up.   She will reach out to Evelin Han @ [facility] today

## 2020-12-01 PROBLEM — I35.0 NONRHEUMATIC AORTIC VALVE STENOSIS: Status: ACTIVE | Noted: 2020-01-01

## 2020-12-01 NOTE — PROGRESS NOTES
Chief Complaint   Patient presents with    Cardiac Valve Problem       Patient Active Problem List    Diagnosis Date Noted    Nonrheumatic aortic valve stenosis 12/01/2020    Essential hypertension 06/30/2020    Low vitamin B12 level 04/23/2020    Recurrent falls     Hyponatremia 04/22/2020    Unable to walk 04/22/2020    Generalized weakness 04/22/2020    Severe protein-calorie malnutrition ([facility] 04/22/2020    Elevated liver function tests 01/27/2020    Unexplained weight loss 01/20/2020    Mild intermittent asthma without complication 37/49/8665    H/O calcium pyrophosphate deposition disease (CPPD) 07/17/2019    Acquired hypothyroidism 06/13/2019    Primary osteoarthritis involving multiple joints 06/13/2019    Anxiety 06/13/2019    Vitamin D deficiency 06/13/2019    Pulmonary emphysema ([facility] 06/13/2019    Degenerative joint disease of left knee 05/15/2018    Chronic osteomyelitis of left femur ([facility] 05/14/2018    COPD without exacerbation ([facility] 05/13/2018    Mixed hyperlipidemia 05/13/2018    Acquired varus deformity knee 06/06/2011       Current Outpatient Medications   Medication Sig Dispense Refill    fluticasone-salmeterol (ADVAIR DISKUS) 500-50 MCG/DOSE diskus inhaler Inhale 1 puff into the lungs every 12 hours 60 each 3    albuterol (PROVENTIL) (2.5 MG/3ML) 0.083% nebulizer solution Take 3 mLs by nebulization every 6 hours as needed for Wheezing 120 each 3    doxycycline hyclate (VIBRA-TABS) 100 MG tablet Take 1 tablet by mouth 2 times daily 180 tablet 5    aspirin EC 81 MG EC tablet Take 1 tablet by mouth daily for 25 days 25 tablet 0    hydrocortisone 2.5 % cream Apply topically 2 times daily.  2 Tube 12    folic acid (FOLVITE) 1 MG tablet Take 1 tablet by mouth daily 30 tablet 3    vitamin D (ERGOCALCIFEROL) 1.25 MG (43255 UT) CAPS capsule Take 1 capsule by mouth once a week 12 capsule 5    escitalopram (LEXAPRO) 10 MG tablet Take 1 tablet by mouth daily 90 tablet 5    levothyroxine (SYNTHROID) 25 MCG tablet Take 1 tablet by mouth daily 90 tablet 5    predniSONE (DELTASONE) 2.5 MG tablet Take 1 tablet by mouth daily 90 tablet 11    PROAIR  (90 Base) MCG/ACT inhaler       predniSONE (DELTASONE) 10 MG tablet ONE TID FOR THREE DAYS, ONE BID FOR THREE DAYS, ONE QD FOR THREE DAYS   FOOD (Patient not taking: Reported on 12/1/2020) 18 tablet 0    methylPREDNISolone (MEDROL DOSEPACK) 4 MG tablet Take by mouth.---continue taking the chronic prednisone with this med (Patient not taking: Reported on 12/1/2020) 1 kit 0    SYMBICORT 80-4.5 MCG/ACT AERO Inhale 2 puffs into the lungs 2 times daily (Patient not taking: Reported on 12/1/2020) 1 Inhaler 3     No current facility-administered medications for this visit.          Allergies   Allergen Reactions    Cephalexin     Sulfa Antibiotics        Vitals:    12/01/20 1341   BP: 106/62   Pulse: 85   Resp: 18   Weight: 184 lb (83.5 kg)   Height: 6' 3\" (1.905 m)       Social History     Socioeconomic History    Marital status:      Spouse name: Not on file    Number of children: Not on file    Years of education: Not on file    Highest education level: Not on file   Occupational History    Occupation: retired     Comment: self employed   Social Needs    Financial resource strain: Not on file    Food insecurity     Worry: Not on file     Inability: Not on file   [employer] needs     Medical: Not on file     Non-medical: Not on file   Tobacco Use    Smoking status: Former Smoker     Years: 20.00     Types: Cigars    Smokeless tobacco: Never Used    Tobacco comment: quit smoking 40 years ago   Substance and Sexual Activity    Alcohol use: Not on file    Drug use: No    Sexual activity: Not Currently     Partners: Female   Lifestyle    Physical activity     Days per week: Not on file     Minutes per session: Not on file    Stress: Not on file   Relationships    Social connections     Talks on phone: Not on file above   Lungs: as above   Eyes: denies changes in vision or discharge. Ears: denies changes in hearing or pain. Nose: denies epistaxis or masses   Throat: denies sore throat or trouble swallowing. Neuro: denies numbness, tingling, tremors. Skin: denies rashes or itching. : denies hematuria, dysuria   GI: denies vomiting, diarrhea   Psych: denies mood changed, anxiety, depression. all others negative. Physical Exam   /62   Pulse 85   Resp 18   Ht 6' 3\" (1.905 m)   Wt 184 lb (83.5 kg)   BMI 23.00 kg/m²   Constitutional: Oriented to person, place, and time. Frail  No distress. Head: Normocephalic and atraumatic. Eyes: EOM are normal. Pupils are equal, round, and reactive to light. Neck: Normal range of motion. Neck supple. No hepatojugular reflux and no JVD present. Carotid bruit is not present. No tracheal deviation present. No thyromegaly present. Cardiovascular: Normal rate, regular rhythm,  Exam reveals no gallop and no friction rub. Grade II/VI long DIANE at base of heart with inaudible S2  Pulmonary/Chest: Effort normal and breath sounds normal. No respiratory distress. No wheezes. No rales. No tenderness. Abdominal: Soft. Bowel sounds are normal. No distension and no mass. No tenderness. No rebound and no guarding. Musculoskeletal: in wheelchair today. No edema and no tenderness. Neurological: Alert and oriented to person, place, and time. Skin: Skin is warm and dry. No rash noted. Not diaphoretic. No erythema. Psychiatric: Normal mood and affect. Behavior is normal.     EKG:  normal sinus rhythm, left axis deviation, RBBB.     ASSESSMENT AND PLAN:  Patient Active Problem List   Diagnosis    Acquired varus deformity knee    COPD without exacerbation ([address]    Mixed hyperlipidemia    Chronic osteomyelitis of left femur ([address]    Degenerative joint disease of left knee    Acquired hypothyroidism    Primary osteoarthritis involving multiple joints    Anxiety    Vitamin D deficiency    Pulmonary emphysema (HCC)    H/O calcium pyrophosphate deposition disease (CPPD)    Unexplained weight loss    Mild intermittent asthma without complication    Elevated liver function tests    Hyponatremia    Unable to walk    Generalized weakness    Severe protein-calorie malnutrition (HCC)    Low vitamin B12 level    Recurrent falls    Essential hypertension    Nonrheumatic aortic valve stenosis       Elderly frail gentleman with COPD and chronic steroid usage with newly detected severe high gradient AS  Exertional dyspnea at least partially due to above  Long discussion with patient and wife about the echo findings, concept of aortic stenosis, symptoms referrable to  Same, natural history, etc  We also discussed concept and practicalities of TAVR a well  He would seem clearly a non surgical candidate, high risk status  TAVR potentially reasonable, but patient wants nothing done  He and wife do understand the natural course of untreated severe AS is death  Will contact me if he changes his mind    Linda White M.D  [facility]

## 2020-12-30 PROBLEM — I35.0 NONRHEUMATIC AORTIC VALVE STENOSIS: Chronic | Status: ACTIVE | Noted: 2020-01-01

## 2020-12-30 NOTE — PROGRESS NOTES
20  Name: Georgia Lopes    : 1935    Sex: male    Age: 80 y.o. Subjective:  Chief Complaint: Patient is here for 6 mo ck  Re    bp  Chol    Aorticstensis      Saw   carido and pt refuses  To consider  AS   Repair---- his breathing stable for now  He awre of risk and  Eventual death withotu srugeyr   Lab noted  D lower      Review of Systems   Constitutional: Negative. HENT: Negative. Eyes: Negative. Respiratory: Positive for shortness of breath (with ecertion). Cardiovascular: Negative. Gastrointestinal: Negative. Endocrine: Negative. Genitourinary: Negative. Musculoskeletal: Positive for arthralgias. Skin: Negative. Allergic/Immunologic: Negative. Neurological: Negative. Hematological: Negative. Psychiatric/Behavioral: Negative.           Current Outpatient Medications:     Handicap Placard MISC, by Does not apply route Dx  Arthritis    5  yrs, Disp: 1 each, Rfl: 0    vitamin D (ERGOCALCIFEROL) 1.25 MG (55151 UT) CAPS capsule, One  Twice a week, Disp: 24 capsule, Rfl: 5    predniSONE (DELTASONE) 10 MG tablet, ONE TID FOR THREE DAYS, ONE BID FOR THREE DAYS, ONE QD FOR THREE DAYS   FOOD (Patient not taking: Reported on 2020), Disp: 18 tablet, Rfl: 0    fluticasone-salmeterol (ADVAIR DISKUS) 500-50 MCG/DOSE diskus inhaler, Inhale 1 puff into the lungs every 12 hours, Disp: 60 each, Rfl: 3    albuterol (PROVENTIL) (2.5 MG/3ML) 0.083% nebulizer solution, Take 3 mLs by nebulization every 6 hours as needed for Wheezing, Disp: 120 each, Rfl: 3    methylPREDNISolone (MEDROL DOSEPACK) 4 MG tablet, Take by mouth.---continue taking the chronic prednisone with this med (Patient not taking: Reported on 2020), Disp: 1 kit, Rfl: 0    doxycycline hyclate (VIBRA-TABS) 100 MG tablet, Take 1 tablet by mouth 2 times daily, Disp: 180 tablet, Rfl: 5    aspirin EC 81 MG EC tablet, Take 1 tablet by mouth daily for 25 days, Disp: 25 tablet, Rfl: 0 Relationship status: Not on file    Intimate partner violence     Fear of current or ex partner: Not on file     Emotionally abused: Not on file     Physically abused: Not on file     Forced sexual activity: Not on file   Other Topics Concern    Not on file   Social History Narrative        COPD    TICS    COLON POLYP    HTN    LIPID    [place]    ANX/DEP    EARLY HYPOTHYROID    EARLY DIET DM    COLON     TMT     CA BLADDER---PICHETTE----DR BRYANT    BPH    EGD  MATT    FRACTURE L FEMUR SHAFT 4-10    L KNEE OR 12-10 TO CLEAR OUT INFECTION-DR ECKERT--LIFELONG DOXY BID    [employer] EVERY DAY AND THREE [employer] BEERS WHEN HE GETS HOME TOLD TO ME BY    WIFE     PARALYSIS DIAPHRAGM WITH DR HUMMEL 9-15----THEN DR VILLALOBOS    MOTHER  AT 89    DAD  AT 79 CAD    VIT D DEFIC 10-16    HYPOTHYROID     HAS 4 CA    PULM DR VELEZ    RHEUM DR NEWTON    ONE BEER AT [facility] DAILY AND TWO AT HOME DAILY    FRAC LEFT FEMUR  DR ECKERT----OLD HARDWARE OUT      Past Medical History:   Diagnosis Date    Anxiety     Asthma     Bladder cancer ([address]     Chronic sinusitis     Closed displaced midcervical fracture of left femur ([address]     Complication of internal orthopedic device, initial encounter ([address] 2018    Hardware not a problem, just in the way of fixation of broken hip, must be removed to insert hemiarthroplasty    Constipation, chronic     COPD     Degenerative joint disease of left knee 5/15/2018    Depression     Gastritis     hx of gastritis    Hearing loss     Hyperlipidemia     Hypertension     Hypothyroidism     Iron deficiency anemia     Left leg pain 7/15/2019    Prediabetes     Type 2 diabetes mellitus without complication ([address]     Vitamin D deficiency      Family History   Problem Relation Age of Onset    Arthritis Mother     Cancer Mother         glaucoma    Heart Disease Father     Coronary Art Dis Father     Emphysema Father  Diabetes Other     Arthritis Son       Past Surgical History:   Procedure Laterality Date    COLONOSCOPY      FEMUR FRACTURE SURGERY  04/12/2010    IM nailing left femoral shaft    FRACTURE SURGERY      VT PARTIAL HIP REPLACEMENT Left 5/14/2018    HIP HEMIARTHROPLASTY LEFT, WITH  REMOVAL OF PREVIOUS HARDWARE, INSERTION OF MEDICATION DELIVERY SYSTEM (WITH INTRAOPERATIVE FLUOROSCOPY) performed by Kashif Rocha [facility] at [facility]      10/10      Vitals:    12/30/20 1252   BP: 132/78   Temp: 99.2 °F (37.3 °C)   TempSrc: Oral   Weight: 200 lb (90.7 kg)       Objective:    Physical Exam  Vitals signs reviewed. Constitutional:       Appearance: He is well-developed. HENT:      Head: Normocephalic. Eyes:      Pupils: Pupils are equal, round, and reactive to light. Neck:      Musculoskeletal: Normal range of motion. Cardiovascular:      Rate and Rhythm: Normal rate and regular rhythm. Heart sounds: Murmur present. Pulmonary:      Effort: Pulmonary effort is normal.      Breath sounds: Normal breath sounds. Abdominal:      Palpations: Abdomen is soft. Musculoskeletal: Normal range of motion. General: Deformity (knucles nd knees  with     oa  deform) present. Skin:     General: Skin is warm. Neurological:      Mental Status: He is alert and oriented to person, place, and time. Psychiatric:         Behavior: Behavior normal.         Dawna Valadez was seen today for discuss labs. Diagnoses and all orders for this visit:    COPD without exacerbation ([address]    Mixed hyperlipidemia    Essential hypertension    Nonrheumatic aortic valve stenosis    Chronic osteomyelitis of left femur (HCC)    Vitamin D deficiency  -     vitamin D (ERGOCALCIFEROL) 1.25 MG (25827 UT) CAPS capsule;  One  Twice a week    Other orders  -     Handicap Placard MISC; by Does not apply route Dx  Arthritis    5  yrs Comments: diet exer  Long discusion   Bout  vlaes surgery     He mikayla torres A great deal of time spent reviewing medications, diet, exercise, social issues. Also reviewing the chart before entering the room with patient and finishing charting after leaving patient's room. More than half of that time was spent face to face with the patient in counseling and coordinating care. Ck bp  dily    Inc  Vit d   Twice a week       Follow Up: Return in about 6 months (around 6/30/2021) for Lab Before.      Seen by:  René Broussard DO

## 2021-01-01 ENCOUNTER — IMMUNIZATION (OUTPATIENT)
Dept: PRIMARY CARE CLINIC | Age: 86
End: 2021-01-01
Payer: MEDICARE

## 2021-01-01 ENCOUNTER — APPOINTMENT (OUTPATIENT)
Dept: GENERAL RADIOLOGY | Age: 86
DRG: 308 | End: 2021-01-01
Payer: MEDICARE

## 2021-01-01 ENCOUNTER — OFFICE VISIT (OUTPATIENT)
Dept: FAMILY MEDICINE CLINIC | Age: 86
End: 2021-01-01
Payer: MEDICARE

## 2021-01-01 ENCOUNTER — APPOINTMENT (OUTPATIENT)
Dept: ULTRASOUND IMAGING | Age: 86
DRG: 308 | End: 2021-01-01
Payer: MEDICARE

## 2021-01-01 ENCOUNTER — APPOINTMENT (OUTPATIENT)
Dept: CT IMAGING | Age: 86
DRG: 308 | End: 2021-01-01
Payer: MEDICARE

## 2021-01-01 ENCOUNTER — OFFICE VISIT (OUTPATIENT)
Dept: PRIMARY CARE CLINIC | Age: 86
End: 2021-01-01
Payer: MEDICARE

## 2021-01-01 ENCOUNTER — NURSE TRIAGE (OUTPATIENT)
Dept: OTHER | Facility: CLINIC | Age: 86
End: 2021-01-01

## 2021-01-01 ENCOUNTER — TELEPHONE (OUTPATIENT)
Dept: CARDIOLOGY CLINIC | Age: 86
End: 2021-01-01

## 2021-01-01 ENCOUNTER — HOSPITAL ENCOUNTER (INPATIENT)
Age: 86
LOS: 11 days | Discharge: NURSING, REHAB OR LONG-TERM CARE | DRG: 308 | End: 2021-05-24
Attending: EMERGENCY MEDICINE | Admitting: INTERNAL MEDICINE
Payer: MEDICARE

## 2021-01-01 ENCOUNTER — HOSPITAL ENCOUNTER (EMERGENCY)
Age: 86
End: 2021-06-13
Attending: EMERGENCY MEDICINE
Payer: MEDICARE

## 2021-01-01 ENCOUNTER — TELEPHONE (OUTPATIENT)
Dept: PRIMARY CARE CLINIC | Age: 86
End: 2021-01-01

## 2021-01-01 ENCOUNTER — TELEPHONE (OUTPATIENT)
Dept: ADMINISTRATIVE | Age: 86
End: 2021-01-01

## 2021-01-01 VITALS
OXYGEN SATURATION: 96 % | BODY MASS INDEX: 23.63 KG/M2 | SYSTOLIC BLOOD PRESSURE: 130 MMHG | DIASTOLIC BLOOD PRESSURE: 82 MMHG | HEIGHT: 75 IN | HEART RATE: 78 BPM | TEMPERATURE: 98.2 F | RESPIRATION RATE: 18 BRPM | WEIGHT: 190.04 LBS

## 2021-01-01 VITALS
WEIGHT: 201 LBS | TEMPERATURE: 97.3 F | BODY MASS INDEX: 24.99 KG/M2 | SYSTOLIC BLOOD PRESSURE: 140 MMHG | DIASTOLIC BLOOD PRESSURE: 72 MMHG | OXYGEN SATURATION: 87 % | HEIGHT: 75 IN | HEART RATE: 120 BPM | RESPIRATION RATE: 20 BRPM

## 2021-01-01 VITALS
SYSTOLIC BLOOD PRESSURE: 128 MMHG | DIASTOLIC BLOOD PRESSURE: 77 MMHG | HEART RATE: 75 BPM | HEIGHT: 75 IN | OXYGEN SATURATION: 92 % | WEIGHT: 201 LBS | BODY MASS INDEX: 24.99 KG/M2 | TEMPERATURE: 98.7 F

## 2021-01-01 VITALS
OXYGEN SATURATION: 92 % | SYSTOLIC BLOOD PRESSURE: 132 MMHG | DIASTOLIC BLOOD PRESSURE: 78 MMHG | HEART RATE: 102 BPM | BODY MASS INDEX: 25.37 KG/M2 | TEMPERATURE: 98.6 F | WEIGHT: 203 LBS

## 2021-01-01 DIAGNOSIS — I48.91 ATRIAL FIBRILLATION WITH RVR (HCC): Primary | ICD-10-CM

## 2021-01-01 DIAGNOSIS — E03.9 ACQUIRED HYPOTHYROIDISM: ICD-10-CM

## 2021-01-01 DIAGNOSIS — J44.9 COPD WITHOUT EXACERBATION (HCC): Primary | Chronic | ICD-10-CM

## 2021-01-01 DIAGNOSIS — J44.9 COPD WITHOUT EXACERBATION (HCC): ICD-10-CM

## 2021-01-01 DIAGNOSIS — I46.9 CARDIAC ARREST (HCC): Primary | ICD-10-CM

## 2021-01-01 DIAGNOSIS — F41.9 ANXIETY: ICD-10-CM

## 2021-01-01 DIAGNOSIS — J20.8 ACUTE BRONCHITIS DUE TO OTHER SPECIFIED ORGANISMS: ICD-10-CM

## 2021-01-01 DIAGNOSIS — R22.2 LOCALIZED SWELLING OF BACK: ICD-10-CM

## 2021-01-01 DIAGNOSIS — R00.0 TACHYCARDIA: ICD-10-CM

## 2021-01-01 DIAGNOSIS — M79.89 RIGHT LEG SWELLING: ICD-10-CM

## 2021-01-01 DIAGNOSIS — I10 ESSENTIAL HYPERTENSION: Chronic | ICD-10-CM

## 2021-01-01 DIAGNOSIS — M86.652 CHRONIC OSTEOMYELITIS OF LEFT FEMUR (HCC): Chronic | ICD-10-CM

## 2021-01-01 DIAGNOSIS — R09.02 HYPOXIA: ICD-10-CM

## 2021-01-01 DIAGNOSIS — U07.1 COVID-19: ICD-10-CM

## 2021-01-01 DIAGNOSIS — I35.0 AORTIC VALVE STENOSIS, ETIOLOGY OF CARDIAC VALVE DISEASE UNSPECIFIED: ICD-10-CM

## 2021-01-01 DIAGNOSIS — J20.8 ACUTE BRONCHITIS DUE TO OTHER SPECIFIED ORGANISMS: Primary | ICD-10-CM

## 2021-01-01 LAB
ALBUMIN SERPL-MCNC: 2.9 G/DL (ref 3.5–5.2)
ALBUMIN SERPL-MCNC: 3.6 G/DL (ref 3.5–5.2)
ALP BLD-CCNC: 43 U/L (ref 40–129)
ALP BLD-CCNC: 51 U/L (ref 40–129)
ALT SERPL-CCNC: 16 U/L (ref 0–40)
ALT SERPL-CCNC: 19 U/L (ref 0–40)
ANION GAP SERPL CALCULATED.3IONS-SCNC: 6 MMOL/L (ref 7–16)
ANION GAP SERPL CALCULATED.3IONS-SCNC: 7 MMOL/L (ref 7–16)
ANION GAP SERPL CALCULATED.3IONS-SCNC: 8 MMOL/L (ref 7–16)
ANION GAP SERPL CALCULATED.3IONS-SCNC: 9 MMOL/L (ref 7–16)
ANISOCYTOSIS: ABNORMAL
ANISOCYTOSIS: ABNORMAL
ANTISTREPTOLYSIN-O: 316 IU/ML (ref 0–200)
APTT: 34.5 SEC (ref 24.5–35.1)
AST SERPL-CCNC: 39 U/L (ref 0–39)
AST SERPL-CCNC: 40 U/L (ref 0–39)
BACTERIA: ABNORMAL /HPF
BACTERIA: NORMAL /HPF
BASOPHILS ABSOLUTE: 0 E9/L (ref 0–0.2)
BASOPHILS ABSOLUTE: 0.02 E9/L (ref 0–0.2)
BASOPHILS ABSOLUTE: 0.04 E9/L (ref 0–0.2)
BASOPHILS ABSOLUTE: 0.04 E9/L (ref 0–0.2)
BASOPHILS RELATIVE PERCENT: 0 % (ref 0–2)
BASOPHILS RELATIVE PERCENT: 0.3 % (ref 0–2)
BASOPHILS RELATIVE PERCENT: 0.7 % (ref 0–2)
BASOPHILS RELATIVE PERCENT: 0.8 % (ref 0–2)
BILIRUB SERPL-MCNC: 1.1 MG/DL (ref 0–1.2)
BILIRUB SERPL-MCNC: 1.3 MG/DL (ref 0–1.2)
BILIRUBIN URINE: ABNORMAL
BILIRUBIN URINE: NEGATIVE
BLOOD, URINE: ABNORMAL
BLOOD, URINE: ABNORMAL
BUN BLDV-MCNC: 13 MG/DL (ref 6–23)
BUN BLDV-MCNC: 20 MG/DL (ref 6–23)
BUN BLDV-MCNC: 21 MG/DL (ref 6–23)
BUN BLDV-MCNC: 24 MG/DL (ref 6–23)
BUN BLDV-MCNC: 25 MG/DL (ref 6–23)
BUN BLDV-MCNC: 26 MG/DL (ref 6–23)
C-REACTIVE PROTEIN: 18.3 MG/DL (ref 0–0.4)
CALCIUM SERPL-MCNC: 8.6 MG/DL (ref 8.6–10.2)
CALCIUM SERPL-MCNC: 9 MG/DL (ref 8.6–10.2)
CALCIUM SERPL-MCNC: 9.2 MG/DL (ref 8.6–10.2)
CALCIUM SERPL-MCNC: 9.3 MG/DL (ref 8.6–10.2)
CALCIUM SERPL-MCNC: 9.3 MG/DL (ref 8.6–10.2)
CALCIUM SERPL-MCNC: 9.6 MG/DL (ref 8.6–10.2)
CHLORIDE BLD-SCNC: 102 MMOL/L (ref 98–107)
CHLORIDE BLD-SCNC: 107 MMOL/L (ref 98–107)
CHLORIDE BLD-SCNC: 107 MMOL/L (ref 98–107)
CHLORIDE BLD-SCNC: 94 MMOL/L (ref 98–107)
CHLORIDE BLD-SCNC: 98 MMOL/L (ref 98–107)
CHLORIDE BLD-SCNC: 99 MMOL/L (ref 98–107)
CLARITY: CLEAR
CLARITY: CLEAR
CO2: 26 MMOL/L (ref 22–29)
CO2: 26 MMOL/L (ref 22–29)
CO2: 28 MMOL/L (ref 22–29)
CO2: 29 MMOL/L (ref 22–29)
CO2: 29 MMOL/L (ref 22–29)
CO2: 31 MMOL/L (ref 22–29)
COLOR: ABNORMAL
COLOR: YELLOW
CREAT SERPL-MCNC: 0.5 MG/DL (ref 0.7–1.2)
CREAT SERPL-MCNC: 0.6 MG/DL (ref 0.7–1.2)
CREAT SERPL-MCNC: 0.6 MG/DL (ref 0.7–1.2)
CREAT SERPL-MCNC: 0.7 MG/DL (ref 0.7–1.2)
CULTURE, BLOOD 2: NORMAL
CULTURE, BLOOD 2: NORMAL
EKG ATRIAL RATE: 86 BPM
EKG Q-T INTERVAL: 326 MS
EKG QRS DURATION: 122 MS
EKG QTC CALCULATION (BAZETT): 486 MS
EKG R AXIS: -47 DEGREES
EKG T AXIS: -72 DEGREES
EKG VENTRICULAR RATE: 134 BPM
EOSINOPHILS ABSOLUTE: 0 E9/L (ref 0.05–0.5)
EOSINOPHILS ABSOLUTE: 0.03 E9/L (ref 0.05–0.5)
EOSINOPHILS ABSOLUTE: 0.03 E9/L (ref 0.05–0.5)
EOSINOPHILS ABSOLUTE: 0.09 E9/L (ref 0.05–0.5)
EOSINOPHILS RELATIVE PERCENT: 0 % (ref 0–6)
EOSINOPHILS RELATIVE PERCENT: 0.4 % (ref 0–6)
EOSINOPHILS RELATIVE PERCENT: 0.6 % (ref 0–6)
EOSINOPHILS RELATIVE PERCENT: 1.6 % (ref 0–6)
EPITHELIAL CELLS, UA: ABNORMAL /HPF
EPITHELIAL CELLS, UA: NORMAL /HPF
GFR AFRICAN AMERICAN: >60
GFR NON-AFRICAN AMERICAN: >60 ML/MIN/1.73
GLUCOSE BLD-MCNC: 104 MG/DL (ref 74–99)
GLUCOSE BLD-MCNC: 105 MG/DL (ref 74–99)
GLUCOSE BLD-MCNC: 122 MG/DL (ref 74–99)
GLUCOSE BLD-MCNC: 143 MG/DL (ref 74–99)
GLUCOSE BLD-MCNC: 192 MG/DL (ref 74–99)
GLUCOSE BLD-MCNC: 95 MG/DL (ref 74–99)
GLUCOSE URINE: NEGATIVE MG/DL
GLUCOSE URINE: NEGATIVE MG/DL
HCT VFR BLD CALC: 40.8 % (ref 37–54)
HCT VFR BLD CALC: 41.1 % (ref 37–54)
HCT VFR BLD CALC: 42.1 % (ref 37–54)
HCT VFR BLD CALC: 44.4 % (ref 37–54)
HEMOGLOBIN: 13.3 G/DL (ref 12.5–16.5)
HEMOGLOBIN: 13.5 G/DL (ref 12.5–16.5)
HEMOGLOBIN: 13.9 G/DL (ref 12.5–16.5)
HEMOGLOBIN: 14.9 G/DL (ref 12.5–16.5)
IMMATURE GRANULOCYTES #: 0.02 E9/L
IMMATURE GRANULOCYTES #: 0.02 E9/L
IMMATURE GRANULOCYTES #: 0.03 E9/L
IMMATURE GRANULOCYTES %: 0.3 % (ref 0–5)
IMMATURE GRANULOCYTES %: 0.4 % (ref 0–5)
IMMATURE GRANULOCYTES %: 0.6 % (ref 0–5)
INR BLD: 1.3
INR BLD: 1.3
KETONES, URINE: ABNORMAL MG/DL
KETONES, URINE: NEGATIVE MG/DL
L. PNEUMOPHILA SEROGP 1 UR AG: NORMAL
LACTIC ACID: 1.9 MMOL/L (ref 0.5–2.2)
LACTIC ACID: 2.4 MMOL/L (ref 0.5–2.2)
LACTIC ACID: 3.2 MMOL/L (ref 0.5–2.2)
LEUKOCYTE ESTERASE, URINE: NEGATIVE
LEUKOCYTE ESTERASE, URINE: NEGATIVE
LYMPHOCYTES ABSOLUTE: 0.48 E9/L (ref 1.5–4)
LYMPHOCYTES ABSOLUTE: 0.72 E9/L (ref 1.5–4)
LYMPHOCYTES ABSOLUTE: 0.82 E9/L (ref 1.5–4)
LYMPHOCYTES ABSOLUTE: 1.74 E9/L (ref 1.5–4)
LYMPHOCYTES RELATIVE PERCENT: 13.8 % (ref 20–42)
LYMPHOCYTES RELATIVE PERCENT: 14.4 % (ref 20–42)
LYMPHOCYTES RELATIVE PERCENT: 20 % (ref 20–42)
LYMPHOCYTES RELATIVE PERCENT: 7.1 % (ref 20–42)
MCH RBC QN AUTO: 34.4 PG (ref 26–35)
MCH RBC QN AUTO: 35.5 PG (ref 26–35)
MCH RBC QN AUTO: 35.6 PG (ref 26–35)
MCH RBC QN AUTO: 35.8 PG (ref 26–35)
MCHC RBC AUTO-ENTMCNC: 32.1 % (ref 32–34.5)
MCHC RBC AUTO-ENTMCNC: 32.4 % (ref 32–34.5)
MCHC RBC AUTO-ENTMCNC: 33.6 % (ref 32–34.5)
MCHC RBC AUTO-ENTMCNC: 34.1 % (ref 32–34.5)
MCV RBC AUTO: 105.2 FL (ref 80–99.9)
MCV RBC AUTO: 106 FL (ref 80–99.9)
MCV RBC AUTO: 106.2 FL (ref 80–99.9)
MCV RBC AUTO: 110.8 FL (ref 80–99.9)
MONOCYTES ABSOLUTE: 0.8 E9/L (ref 0.1–0.95)
MONOCYTES ABSOLUTE: 0.92 E9/L (ref 0.1–0.95)
MONOCYTES ABSOLUTE: 0.92 E9/L (ref 0.1–0.95)
MONOCYTES ABSOLUTE: 1.13 E9/L (ref 0.1–0.95)
MONOCYTES RELATIVE PERCENT: 13 % (ref 2–12)
MONOCYTES RELATIVE PERCENT: 13.5 % (ref 2–12)
MONOCYTES RELATIVE PERCENT: 15.4 % (ref 2–12)
MONOCYTES RELATIVE PERCENT: 16.2 % (ref 2–12)
MRSA CULTURE ONLY: NORMAL
NEUTROPHILS ABSOLUTE: 3.59 E9/L (ref 1.8–7.3)
NEUTROPHILS ABSOLUTE: 3.8 E9/L (ref 1.8–7.3)
NEUTROPHILS ABSOLUTE: 5.32 E9/L (ref 1.8–7.3)
NEUTROPHILS ABSOLUTE: 5.83 E9/L (ref 1.8–7.3)
NEUTROPHILS RELATIVE PERCENT: 66.7 % (ref 43–80)
NEUTROPHILS RELATIVE PERCENT: 67 % (ref 43–80)
NEUTROPHILS RELATIVE PERCENT: 68.8 % (ref 43–80)
NEUTROPHILS RELATIVE PERCENT: 78.4 % (ref 43–80)
NITRITE, URINE: NEGATIVE
NITRITE, URINE: NEGATIVE
PDW BLD-RTO: 14.1 FL (ref 11.5–15)
PDW BLD-RTO: 14.1 FL (ref 11.5–15)
PDW BLD-RTO: 14.5 FL (ref 11.5–15)
PDW BLD-RTO: 15.2 FL (ref 11.5–15)
PH UA: 5.5 (ref 5–9)
PH UA: 7 (ref 5–9)
PLATELET # BLD: 151 E9/L (ref 130–450)
PLATELET # BLD: 162 E9/L (ref 130–450)
PLATELET # BLD: 171 E9/L (ref 130–450)
PLATELET # BLD: 185 E9/L (ref 130–450)
PMV BLD AUTO: 10.4 FL (ref 7–12)
PMV BLD AUTO: 10.5 FL (ref 7–12)
PMV BLD AUTO: 10.5 FL (ref 7–12)
PMV BLD AUTO: 10.7 FL (ref 7–12)
POTASSIUM REFLEX MAGNESIUM: 4.3 MMOL/L (ref 3.5–5)
POTASSIUM REFLEX MAGNESIUM: 4.3 MMOL/L (ref 3.5–5)
POTASSIUM SERPL-SCNC: 3.8 MMOL/L (ref 3.5–5)
POTASSIUM SERPL-SCNC: 4 MMOL/L (ref 3.5–5)
POTASSIUM SERPL-SCNC: 4.5 MMOL/L (ref 3.5–5)
POTASSIUM SERPL-SCNC: 5.5 MMOL/L (ref 3.5–5)
PRO-BNP: 1256 PG/ML (ref 0–450)
PRO-BNP: 1360 PG/ML (ref 0–450)
PRO-BNP: 2422 PG/ML (ref 0–450)
PROCALCITONIN: 0.79 NG/ML (ref 0–0.08)
PROTEIN UA: ABNORMAL MG/DL
PROTEIN UA: NEGATIVE MG/DL
PROTHROMBIN TIME: 14.9 SEC (ref 9.3–12.4)
PROTHROMBIN TIME: 14.9 SEC (ref 9.3–12.4)
RBC # BLD: 3.8 E12/L (ref 3.8–5.8)
RBC # BLD: 3.87 E12/L (ref 3.8–5.8)
RBC # BLD: 3.88 E12/L (ref 3.8–5.8)
RBC # BLD: 4.19 E12/L (ref 3.8–5.8)
RBC # BLD: NORMAL 10*6/UL
RBC UA: ABNORMAL /HPF (ref 0–2)
RBC UA: NORMAL /HPF (ref 0–2)
SARS-COV-2, NAAT: NOT DETECTED
SEDIMENTATION RATE, ERYTHROCYTE: 80 MM/HR (ref 0–15)
SODIUM BLD-SCNC: 131 MMOL/L (ref 132–146)
SODIUM BLD-SCNC: 133 MMOL/L (ref 132–146)
SODIUM BLD-SCNC: 133 MMOL/L (ref 132–146)
SODIUM BLD-SCNC: 139 MMOL/L (ref 132–146)
SODIUM BLD-SCNC: 139 MMOL/L (ref 132–146)
SODIUM BLD-SCNC: 143 MMOL/L (ref 132–146)
SPECIFIC GRAVITY UA: 1.01 (ref 1–1.03)
SPECIFIC GRAVITY UA: 1.01 (ref 1–1.03)
STREP PNEUMONIAE ANTIGEN, URINE: NORMAL
TOTAL PROTEIN: 6.5 G/DL (ref 6.4–8.3)
TOTAL PROTEIN: 7.5 G/DL (ref 6.4–8.3)
TROPONIN: 0.02 NG/ML (ref 0–0.03)
TSH SERPL DL<=0.05 MIU/L-ACNC: 1.85 UIU/ML (ref 0.27–4.2)
URINE CULTURE, ROUTINE: NORMAL
URINE CULTURE, ROUTINE: NORMAL
UROBILINOGEN, URINE: 1 E.U./DL
UROBILINOGEN, URINE: >=8 E.U./DL
WBC # BLD: 5.2 E9/L (ref 4.5–11.5)
WBC # BLD: 5.7 E9/L (ref 4.5–11.5)
WBC # BLD: 6.8 E9/L (ref 4.5–11.5)
WBC # BLD: 8.7 E9/L (ref 4.5–11.5)
WBC UA: ABNORMAL /HPF (ref 0–5)
WBC UA: NORMAL /HPF (ref 0–5)

## 2021-01-01 PROCEDURE — 6360000002 HC RX W HCPCS: Performed by: INTERNAL MEDICINE

## 2021-01-01 PROCEDURE — 74230 X-RAY XM SWLNG FUNCJ C+: CPT

## 2021-01-01 PROCEDURE — 97530 THERAPEUTIC ACTIVITIES: CPT

## 2021-01-01 PROCEDURE — 94664 DEMO&/EVAL PT USE INHALER: CPT

## 2021-01-01 PROCEDURE — 2060000000 HC ICU INTERMEDIATE R&B

## 2021-01-01 PROCEDURE — 80048 BASIC METABOLIC PNL TOTAL CA: CPT

## 2021-01-01 PROCEDURE — 6370000000 HC RX 637 (ALT 250 FOR IP): Performed by: NURSE PRACTITIONER

## 2021-01-01 PROCEDURE — 6370000000 HC RX 637 (ALT 250 FOR IP): Performed by: INTERNAL MEDICINE

## 2021-01-01 PROCEDURE — 87040 BLOOD CULTURE FOR BACTERIA: CPT

## 2021-01-01 PROCEDURE — 83880 ASSAY OF NATRIURETIC PEPTIDE: CPT

## 2021-01-01 PROCEDURE — 2580000003 HC RX 258: Performed by: INTERNAL MEDICINE

## 2021-01-01 PROCEDURE — 36415 COLL VENOUS BLD VENIPUNCTURE: CPT

## 2021-01-01 PROCEDURE — 97161 PT EVAL LOW COMPLEX 20 MIN: CPT

## 2021-01-01 PROCEDURE — 99233 SBSQ HOSP IP/OBS HIGH 50: CPT | Performed by: INTERNAL MEDICINE

## 2021-01-01 PROCEDURE — G8926 SPIRO NO PERF OR DOC: HCPCS | Performed by: FAMILY MEDICINE

## 2021-01-01 PROCEDURE — 92526 ORAL FUNCTION THERAPY: CPT | Performed by: SPEECH-LANGUAGE PATHOLOGIST

## 2021-01-01 PROCEDURE — 85610 PROTHROMBIN TIME: CPT

## 2021-01-01 PROCEDURE — 0001A COVID-19, PFIZER VACCINE 30MCG/0.3ML DOSE: CPT | Performed by: NURSE PRACTITIONER

## 2021-01-01 PROCEDURE — 87088 URINE BACTERIA CULTURE: CPT

## 2021-01-01 PROCEDURE — 70450 CT HEAD/BRAIN W/O DYE: CPT

## 2021-01-01 PROCEDURE — 99285 EMERGENCY DEPT VISIT HI MDM: CPT

## 2021-01-01 PROCEDURE — 6360000002 HC RX W HCPCS: Performed by: EMERGENCY MEDICINE

## 2021-01-01 PROCEDURE — 97165 OT EVAL LOW COMPLEX 30 MIN: CPT

## 2021-01-01 PROCEDURE — 94640 AIRWAY INHALATION TREATMENT: CPT

## 2021-01-01 PROCEDURE — 85025 COMPLETE CBC W/AUTO DIFF WBC: CPT

## 2021-01-01 PROCEDURE — 84145 PROCALCITONIN (PCT): CPT

## 2021-01-01 PROCEDURE — 81001 URINALYSIS AUTO W/SCOPE: CPT

## 2021-01-01 PROCEDURE — 87081 CULTURE SCREEN ONLY: CPT

## 2021-01-01 PROCEDURE — APPSS45 APP SPLIT SHARED TIME 31-45 MINUTES: Performed by: NURSE PRACTITIONER

## 2021-01-01 PROCEDURE — 99232 SBSQ HOSP IP/OBS MODERATE 35: CPT | Performed by: INTERNAL MEDICINE

## 2021-01-01 PROCEDURE — 86140 C-REACTIVE PROTEIN: CPT

## 2021-01-01 PROCEDURE — 2500000003 HC RX 250 WO HCPCS: Performed by: EMERGENCY MEDICINE

## 2021-01-01 PROCEDURE — 80053 COMPREHEN METABOLIC PANEL: CPT

## 2021-01-01 PROCEDURE — 71046 X-RAY EXAM CHEST 2 VIEWS: CPT

## 2021-01-01 PROCEDURE — 84443 ASSAY THYROID STIM HORMONE: CPT

## 2021-01-01 PROCEDURE — G8417 CALC BMI ABV UP PARAM F/U: HCPCS | Performed by: PHYSICIAN ASSISTANT

## 2021-01-01 PROCEDURE — 91300 COVID-19, PFIZER VACCINE 30MCG/0.3ML DOSE: CPT | Performed by: PHYSICIAN ASSISTANT

## 2021-01-01 PROCEDURE — 71045 X-RAY EXAM CHEST 1 VIEW: CPT

## 2021-01-01 PROCEDURE — 3023F SPIROM DOC REV: CPT | Performed by: FAMILY MEDICINE

## 2021-01-01 PROCEDURE — 97535 SELF CARE MNGMENT TRAINING: CPT

## 2021-01-01 PROCEDURE — 92950 HEART/LUNG RESUSCITATION CPR: CPT

## 2021-01-01 PROCEDURE — 96374 THER/PROPH/DIAG INJ IV PUSH: CPT

## 2021-01-01 PROCEDURE — 0002A COVID-19, PFIZER VACCINE 30MCG/0.3ML DOSE: CPT | Performed by: PHYSICIAN ASSISTANT

## 2021-01-01 PROCEDURE — 99239 HOSP IP/OBS DSCHRG MGMT >30: CPT | Performed by: INTERNAL MEDICINE

## 2021-01-01 PROCEDURE — 4040F PNEUMOC VAC/ADMIN/RCVD: CPT | Performed by: PHYSICIAN ASSISTANT

## 2021-01-01 PROCEDURE — 85730 THROMBOPLASTIN TIME PARTIAL: CPT

## 2021-01-01 PROCEDURE — 84484 ASSAY OF TROPONIN QUANT: CPT

## 2021-01-01 PROCEDURE — 99223 1ST HOSP IP/OBS HIGH 75: CPT | Performed by: INTERNAL MEDICINE

## 2021-01-01 PROCEDURE — 1123F ACP DISCUSS/DSCN MKR DOCD: CPT | Performed by: PHYSICIAN ASSISTANT

## 2021-01-01 PROCEDURE — G8417 CALC BMI ABV UP PARAM F/U: HCPCS | Performed by: FAMILY MEDICINE

## 2021-01-01 PROCEDURE — 4040F PNEUMOC VAC/ADMIN/RCVD: CPT | Performed by: FAMILY MEDICINE

## 2021-01-01 PROCEDURE — 96372 THER/PROPH/DIAG INJ SC/IM: CPT | Performed by: FAMILY MEDICINE

## 2021-01-01 PROCEDURE — 99213 OFFICE O/P EST LOW 20 MIN: CPT | Performed by: FAMILY MEDICINE

## 2021-01-01 PROCEDURE — 92611 MOTION FLUOROSCOPY/SWALLOW: CPT | Performed by: SPEECH-LANGUAGE PATHOLOGIST

## 2021-01-01 PROCEDURE — 87635 SARS-COV-2 COVID-19 AMP PRB: CPT

## 2021-01-01 PROCEDURE — 1036F TOBACCO NON-USER: CPT | Performed by: FAMILY MEDICINE

## 2021-01-01 PROCEDURE — 87449 NOS EACH ORGANISM AG IA: CPT

## 2021-01-01 PROCEDURE — 85651 RBC SED RATE NONAUTOMATED: CPT

## 2021-01-01 PROCEDURE — 93971 EXTREMITY STUDY: CPT

## 2021-01-01 PROCEDURE — 93000 ELECTROCARDIOGRAM COMPLETE: CPT | Performed by: PHYSICIAN ASSISTANT

## 2021-01-01 PROCEDURE — 93005 ELECTROCARDIOGRAM TRACING: CPT | Performed by: FAMILY MEDICINE

## 2021-01-01 PROCEDURE — G8428 CUR MEDS NOT DOCUMENT: HCPCS | Performed by: FAMILY MEDICINE

## 2021-01-01 PROCEDURE — 73110 X-RAY EXAM OF WRIST: CPT

## 2021-01-01 PROCEDURE — 99232 SBSQ HOSP IP/OBS MODERATE 35: CPT | Performed by: ORTHOPAEDIC SURGERY

## 2021-01-01 PROCEDURE — 73200 CT UPPER EXTREMITY W/O DYE: CPT

## 2021-01-01 PROCEDURE — G8427 DOCREV CUR MEDS BY ELIG CLIN: HCPCS | Performed by: PHYSICIAN ASSISTANT

## 2021-01-01 PROCEDURE — 93010 ELECTROCARDIOGRAM REPORT: CPT | Performed by: INTERNAL MEDICINE

## 2021-01-01 PROCEDURE — 86060 ANTISTREPTOLYSIN O TITER: CPT

## 2021-01-01 PROCEDURE — 2580000003 HC RX 258: Performed by: EMERGENCY MEDICINE

## 2021-01-01 PROCEDURE — 99283 EMERGENCY DEPT VISIT LOW MDM: CPT

## 2021-01-01 PROCEDURE — 99215 OFFICE O/P EST HI 40 MIN: CPT | Performed by: PHYSICIAN ASSISTANT

## 2021-01-01 PROCEDURE — 1036F TOBACCO NON-USER: CPT | Performed by: PHYSICIAN ASSISTANT

## 2021-01-01 PROCEDURE — 91300 COVID-19, PFIZER VACCINE 30MCG/0.3ML DOSE: CPT | Performed by: NURSE PRACTITIONER

## 2021-01-01 PROCEDURE — 83605 ASSAY OF LACTIC ACID: CPT

## 2021-01-01 PROCEDURE — 99214 OFFICE O/P EST MOD 30 MIN: CPT | Performed by: FAMILY MEDICINE

## 2021-01-01 PROCEDURE — 1123F ACP DISCUSS/DSCN MKR DOCD: CPT | Performed by: FAMILY MEDICINE

## 2021-01-01 PROCEDURE — 99221 1ST HOSP IP/OBS SF/LOW 40: CPT | Performed by: ORTHOPAEDIC SURGERY

## 2021-01-01 PROCEDURE — 99222 1ST HOSP IP/OBS MODERATE 55: CPT | Performed by: INTERNAL MEDICINE

## 2021-01-01 PROCEDURE — 6360000002 HC RX W HCPCS

## 2021-01-01 RX ORDER — ASPIRIN 81 MG/1
81 TABLET ORAL DAILY
Status: DISCONTINUED | OUTPATIENT
Start: 2021-01-01 | End: 2021-01-01

## 2021-01-01 RX ORDER — ALBUTEROL SULFATE 90 UG/1
2 AEROSOL, METERED RESPIRATORY (INHALATION) 4 TIMES DAILY PRN
Qty: 1 INHALER | Refills: 5 | Status: SHIPPED
Start: 2021-01-01 | End: 2021-01-01 | Stop reason: SDUPTHER

## 2021-01-01 RX ORDER — METHYLPREDNISOLONE ACETATE 40 MG/ML
40 INJECTION, SUSPENSION INTRA-ARTICULAR; INTRALESIONAL; INTRAMUSCULAR; SOFT TISSUE ONCE
Status: COMPLETED | OUTPATIENT
Start: 2021-01-01 | End: 2021-01-01

## 2021-01-01 RX ORDER — BUDESONIDE AND FORMOTEROL FUMARATE DIHYDRATE 160; 4.5 UG/1; UG/1
2 AEROSOL RESPIRATORY (INHALATION) 2 TIMES DAILY
Status: DISCONTINUED | OUTPATIENT
Start: 2021-01-01 | End: 2021-01-01 | Stop reason: ALTCHOICE

## 2021-01-01 RX ORDER — METOPROLOL SUCCINATE 25 MG/1
75 TABLET, EXTENDED RELEASE ORAL 2 TIMES DAILY
Qty: 30 TABLET | Refills: 3 | Status: SHIPPED
Start: 2021-01-01 | End: 2021-01-01 | Stop reason: HOSPADM

## 2021-01-01 RX ORDER — DOXYCYCLINE HYCLATE 100 MG
100 TABLET ORAL 2 TIMES DAILY
Qty: 180 TABLET | Refills: 5 | Status: ON HOLD
Start: 2021-01-01 | End: 2021-01-01 | Stop reason: HOSPADM

## 2021-01-01 RX ORDER — SODIUM CHLORIDE 9 MG/ML
25 INJECTION, SOLUTION INTRAVENOUS PRN
Status: DISCONTINUED | OUTPATIENT
Start: 2021-01-01 | End: 2021-01-01 | Stop reason: HOSPADM

## 2021-01-01 RX ORDER — FUROSEMIDE 10 MG/ML
40 INJECTION INTRAMUSCULAR; INTRAVENOUS 2 TIMES DAILY
Status: DISCONTINUED | OUTPATIENT
Start: 2021-01-01 | End: 2021-01-01

## 2021-01-01 RX ORDER — DOXYCYCLINE HYCLATE 100 MG
100 TABLET ORAL 2 TIMES DAILY
Qty: 20 TABLET | Refills: 0 | Status: SHIPPED | OUTPATIENT
Start: 2021-01-01 | End: 2021-01-01

## 2021-01-01 RX ORDER — 0.9 % SODIUM CHLORIDE 0.9 %
500 INTRAVENOUS SOLUTION INTRAVENOUS ONCE
Status: COMPLETED | OUTPATIENT
Start: 2021-01-01 | End: 2021-01-01

## 2021-01-01 RX ORDER — LEVOTHYROXINE SODIUM 0.03 MG/1
25 TABLET ORAL DAILY
Qty: 90 TABLET | Refills: 5 | Status: SHIPPED | OUTPATIENT
Start: 2021-01-01

## 2021-01-01 RX ORDER — POLYETHYLENE GLYCOL 3350 17 G/17G
17 POWDER, FOR SOLUTION ORAL DAILY PRN
Status: DISCONTINUED | OUTPATIENT
Start: 2021-01-01 | End: 2021-01-01 | Stop reason: HOSPADM

## 2021-01-01 RX ORDER — DIGOXIN 125 MCG
125 TABLET ORAL DAILY
Status: DISCONTINUED | OUTPATIENT
Start: 2021-01-01 | End: 2021-01-01

## 2021-01-01 RX ORDER — METOPROLOL SUCCINATE 25 MG/1
25 TABLET, EXTENDED RELEASE ORAL 2 TIMES DAILY
Status: DISCONTINUED | OUTPATIENT
Start: 2021-01-01 | End: 2021-01-01 | Stop reason: HOSPADM

## 2021-01-01 RX ORDER — ESCITALOPRAM OXALATE 10 MG/1
10 TABLET ORAL DAILY
Qty: 90 TABLET | Refills: 5 | Status: SHIPPED | OUTPATIENT
Start: 2021-01-01

## 2021-01-01 RX ORDER — ACETAMINOPHEN 650 MG/1
650 SUPPOSITORY RECTAL EVERY 6 HOURS PRN
Status: DISCONTINUED | OUTPATIENT
Start: 2021-01-01 | End: 2021-01-01 | Stop reason: HOSPADM

## 2021-01-01 RX ORDER — SODIUM CHLORIDE 0.9 % (FLUSH) 0.9 %
5-40 SYRINGE (ML) INJECTION PRN
Status: DISCONTINUED | OUTPATIENT
Start: 2021-01-01 | End: 2021-01-01 | Stop reason: HOSPADM

## 2021-01-01 RX ORDER — ALBUTEROL SULFATE 90 UG/1
2 AEROSOL, METERED RESPIRATORY (INHALATION) 4 TIMES DAILY PRN
Qty: 1 INHALER | Refills: 5 | Status: SHIPPED | OUTPATIENT
Start: 2021-01-01

## 2021-01-01 RX ORDER — LIDOCAINE HYDROCHLORIDE 20 MG/ML
INJECTION, SOLUTION INTRAVENOUS DAILY PRN
Status: COMPLETED | OUTPATIENT
Start: 2021-01-01 | End: 2021-01-01

## 2021-01-01 RX ORDER — ACETAMINOPHEN 325 MG/1
650 TABLET ORAL EVERY 6 HOURS PRN
Status: DISCONTINUED | OUTPATIENT
Start: 2021-01-01 | End: 2021-01-01 | Stop reason: HOSPADM

## 2021-01-01 RX ORDER — DIGOXIN 250 MCG
250 TABLET ORAL EVERY OTHER DAY
Status: DISCONTINUED | OUTPATIENT
Start: 2021-01-01 | End: 2021-01-01 | Stop reason: HOSPADM

## 2021-01-01 RX ORDER — ONDANSETRON 2 MG/ML
4 INJECTION INTRAMUSCULAR; INTRAVENOUS EVERY 6 HOURS PRN
Status: DISCONTINUED | OUTPATIENT
Start: 2021-01-01 | End: 2021-01-01 | Stop reason: HOSPADM

## 2021-01-01 RX ORDER — DIGOXIN 125 MCG
125 TABLET ORAL EVERY OTHER DAY
Qty: 15 TABLET | Refills: 1
Start: 2021-01-01

## 2021-01-01 RX ORDER — DIGOXIN 125 MCG
125 TABLET ORAL EVERY OTHER DAY
Status: DISCONTINUED | OUTPATIENT
Start: 2021-01-01 | End: 2021-01-01 | Stop reason: HOSPADM

## 2021-01-01 RX ORDER — ATORVASTATIN CALCIUM 40 MG/1
40 TABLET, FILM COATED ORAL NIGHTLY
Status: DISCONTINUED | OUTPATIENT
Start: 2021-01-01 | End: 2021-01-01 | Stop reason: HOSPADM

## 2021-01-01 RX ORDER — PROMETHAZINE HYDROCHLORIDE 25 MG/1
12.5 TABLET ORAL EVERY 6 HOURS PRN
Status: DISCONTINUED | OUTPATIENT
Start: 2021-01-01 | End: 2021-01-01 | Stop reason: HOSPADM

## 2021-01-01 RX ORDER — DIGOXIN 250 MCG
250 TABLET ORAL EVERY OTHER DAY
Qty: 15 TABLET | Refills: 1
Start: 2021-01-01

## 2021-01-01 RX ORDER — 0.9 % SODIUM CHLORIDE 0.9 %
250 INTRAVENOUS SOLUTION INTRAVENOUS ONCE
Status: COMPLETED | OUTPATIENT
Start: 2021-01-01 | End: 2021-01-01

## 2021-01-01 RX ORDER — 0.9 % SODIUM CHLORIDE 0.9 %
1000 INTRAVENOUS SOLUTION INTRAVENOUS ONCE
Status: COMPLETED | OUTPATIENT
Start: 2021-01-01 | End: 2021-01-01

## 2021-01-01 RX ORDER — LEVOTHYROXINE SODIUM 0.03 MG/1
25 TABLET ORAL DAILY
Status: DISCONTINUED | OUTPATIENT
Start: 2021-01-01 | End: 2021-01-01 | Stop reason: HOSPADM

## 2021-01-01 RX ORDER — FUROSEMIDE 20 MG/1
20 TABLET ORAL DAILY
Qty: 30 TABLET | Refills: 2 | Status: SHIPPED | OUTPATIENT
Start: 2021-01-01

## 2021-01-01 RX ORDER — DIGOXIN 0.25 MG/ML
125 INJECTION INTRAMUSCULAR; INTRAVENOUS ONCE
Status: COMPLETED | OUTPATIENT
Start: 2021-01-01 | End: 2021-01-01

## 2021-01-01 RX ORDER — PREDNISONE 2.5 MG
2.5 TABLET ORAL DAILY
Qty: 90 TABLET | Refills: 5
Start: 2021-01-01 | End: 2021-01-01 | Stop reason: SDUPTHER

## 2021-01-01 RX ORDER — METOPROLOL SUCCINATE 25 MG/1
12.5 TABLET, EXTENDED RELEASE ORAL DAILY
Status: DISCONTINUED | OUTPATIENT
Start: 2021-01-01 | End: 2021-01-01

## 2021-01-01 RX ORDER — ALBUTEROL SULFATE 2.5 MG/3ML
2.5 SOLUTION RESPIRATORY (INHALATION) EVERY 6 HOURS PRN
Qty: 120 EACH | Refills: 3 | Status: SHIPPED | OUTPATIENT
Start: 2021-01-01

## 2021-01-01 RX ORDER — EPINEPHRINE 0.1 MG/ML
SYRINGE (ML) INJECTION DAILY PRN
Status: COMPLETED | OUTPATIENT
Start: 2021-01-01 | End: 2021-01-01

## 2021-01-01 RX ORDER — PREDNISONE 1 MG/1
2.5 TABLET ORAL DAILY
Status: DISCONTINUED | OUTPATIENT
Start: 2021-01-01 | End: 2021-01-01 | Stop reason: HOSPADM

## 2021-01-01 RX ORDER — BUDESONIDE 0.5 MG/2ML
0.5 INHALANT ORAL 2 TIMES DAILY
Status: DISCONTINUED | OUTPATIENT
Start: 2021-01-01 | End: 2021-01-01 | Stop reason: HOSPADM

## 2021-01-01 RX ORDER — METOPROLOL SUCCINATE 25 MG/1
25 TABLET, EXTENDED RELEASE ORAL 2 TIMES DAILY
Status: DISCONTINUED | OUTPATIENT
Start: 2021-01-01 | End: 2021-01-01

## 2021-01-01 RX ORDER — SODIUM CHLORIDE 0.9 % (FLUSH) 0.9 %
5-40 SYRINGE (ML) INJECTION EVERY 12 HOURS SCHEDULED
Status: DISCONTINUED | OUTPATIENT
Start: 2021-01-01 | End: 2021-01-01 | Stop reason: HOSPADM

## 2021-01-01 RX ORDER — ATORVASTATIN CALCIUM 40 MG/1
40 TABLET, FILM COATED ORAL NIGHTLY
Qty: 30 TABLET | Refills: 3 | Status: SHIPPED | OUTPATIENT
Start: 2021-01-01

## 2021-01-01 RX ORDER — FOLIC ACID 1 MG/1
1 TABLET ORAL DAILY
Status: DISCONTINUED | OUTPATIENT
Start: 2021-01-01 | End: 2021-01-01 | Stop reason: HOSPADM

## 2021-01-01 RX ORDER — FUROSEMIDE 10 MG/ML
20 INJECTION INTRAMUSCULAR; INTRAVENOUS 2 TIMES DAILY
Status: DISCONTINUED | OUTPATIENT
Start: 2021-01-01 | End: 2021-01-01

## 2021-01-01 RX ORDER — METOPROLOL SUCCINATE 50 MG/1
50 TABLET, EXTENDED RELEASE ORAL 2 TIMES DAILY
Status: DISCONTINUED | OUTPATIENT
Start: 2021-01-01 | End: 2021-01-01

## 2021-01-01 RX ORDER — DIGOXIN 0.25 MG/ML
250 INJECTION INTRAMUSCULAR; INTRAVENOUS EVERY 6 HOURS
Status: COMPLETED | OUTPATIENT
Start: 2021-01-01 | End: 2021-01-01

## 2021-01-01 RX ORDER — DIGOXIN 125 MCG
125 TABLET ORAL DAILY
Qty: 30 TABLET | Refills: 3 | Status: SHIPPED
Start: 2021-01-01 | End: 2021-01-01 | Stop reason: HOSPADM

## 2021-01-01 RX ORDER — PREDNISONE 2.5 MG
2.5 TABLET ORAL DAILY
Qty: 90 TABLET | Refills: 5 | Status: SHIPPED | OUTPATIENT
Start: 2021-01-01

## 2021-01-01 RX ORDER — METOPROLOL SUCCINATE 25 MG/1
25 TABLET, EXTENDED RELEASE ORAL 2 TIMES DAILY
Qty: 30 TABLET | Refills: 1
Start: 2021-01-01

## 2021-01-01 RX ORDER — ARFORMOTEROL TARTRATE 15 UG/2ML
15 SOLUTION RESPIRATORY (INHALATION) 2 TIMES DAILY
Status: DISCONTINUED | OUTPATIENT
Start: 2021-01-01 | End: 2021-01-01 | Stop reason: HOSPADM

## 2021-01-01 RX ADMIN — ARFORMOTEROL TARTRATE 15 MCG: 15 SOLUTION RESPIRATORY (INHALATION) at 20:28

## 2021-01-01 RX ADMIN — METOPROLOL SUCCINATE 25 MG: 25 TABLET, FILM COATED, EXTENDED RELEASE ORAL at 21:58

## 2021-01-01 RX ADMIN — ARFORMOTEROL TARTRATE 15 MCG: 15 SOLUTION RESPIRATORY (INHALATION) at 20:33

## 2021-01-01 RX ADMIN — FUROSEMIDE 20 MG: 10 INJECTION, SOLUTION INTRAMUSCULAR; INTRAVENOUS at 18:58

## 2021-01-01 RX ADMIN — LEVOTHYROXINE SODIUM 25 MCG: 25 TABLET ORAL at 05:21

## 2021-01-01 RX ADMIN — FUROSEMIDE 40 MG: 10 INJECTION, SOLUTION INTRAVENOUS at 17:45

## 2021-01-01 RX ADMIN — FUROSEMIDE 40 MG: 10 INJECTION, SOLUTION INTRAVENOUS at 08:48

## 2021-01-01 RX ADMIN — LEVOTHYROXINE SODIUM 25 MCG: 25 TABLET ORAL at 06:10

## 2021-01-01 RX ADMIN — FOLIC ACID 1 MG: 1 TABLET ORAL at 08:44

## 2021-01-01 RX ADMIN — APIXABAN 5 MG: 5 TABLET, FILM COATED ORAL at 21:09

## 2021-01-01 RX ADMIN — Medication 10 ML: at 20:31

## 2021-01-01 RX ADMIN — LIDOCAINE HYDROCHLORIDE 100 MG: 20 INJECTION, SOLUTION INTRAVENOUS at 04:26

## 2021-01-01 RX ADMIN — SODIUM CHLORIDE 500 ML: 9 INJECTION, SOLUTION INTRAVENOUS at 11:12

## 2021-01-01 RX ADMIN — METOPROLOL SUCCINATE 25 MG: 25 TABLET, FILM COATED, EXTENDED RELEASE ORAL at 10:47

## 2021-01-01 RX ADMIN — LEVOTHYROXINE SODIUM 25 MCG: 25 TABLET ORAL at 05:28

## 2021-01-01 RX ADMIN — APIXABAN 5 MG: 5 TABLET, FILM COATED ORAL at 22:38

## 2021-01-01 RX ADMIN — FOLIC ACID 1 MG: 1 TABLET ORAL at 10:47

## 2021-01-01 RX ADMIN — Medication 10 ML: at 22:46

## 2021-01-01 RX ADMIN — BUDESONIDE 500 MCG: 0.5 SUSPENSION RESPIRATORY (INHALATION) at 08:35

## 2021-01-01 RX ADMIN — DIGOXIN 250 MCG: 0.25 TABLET ORAL at 10:47

## 2021-01-01 RX ADMIN — BUDESONIDE 500 MCG: 0.5 SUSPENSION RESPIRATORY (INHALATION) at 08:47

## 2021-01-01 RX ADMIN — PREDNISONE 2.5 MG: 5 TABLET ORAL at 09:23

## 2021-01-01 RX ADMIN — MICONAZOLE NITRATE: 2 OINTMENT TOPICAL at 20:44

## 2021-01-01 RX ADMIN — BUDESONIDE 500 MCG: 0.5 SUSPENSION RESPIRATORY (INHALATION) at 19:24

## 2021-01-01 RX ADMIN — BUDESONIDE 500 MCG: 0.5 SUSPENSION RESPIRATORY (INHALATION) at 21:18

## 2021-01-01 RX ADMIN — PREDNISONE 2.5 MG: 5 TABLET ORAL at 08:14

## 2021-01-01 RX ADMIN — LEVOTHYROXINE SODIUM 25 MCG: 25 TABLET ORAL at 06:13

## 2021-01-01 RX ADMIN — Medication 10 ML: at 20:30

## 2021-01-01 RX ADMIN — DIGOXIN 125 MCG: 125 TABLET ORAL at 09:05

## 2021-01-01 RX ADMIN — METOPROLOL SUCCINATE 12.5 MG: 25 TABLET, EXTENDED RELEASE ORAL at 10:20

## 2021-01-01 RX ADMIN — Medication 10 ML: at 09:36

## 2021-01-01 RX ADMIN — Medication 10 ML: at 21:01

## 2021-01-01 RX ADMIN — Medication 10 ML: at 21:00

## 2021-01-01 RX ADMIN — MICONAZOLE NITRATE: 2 OINTMENT TOPICAL at 21:58

## 2021-01-01 RX ADMIN — ARFORMOTEROL TARTRATE 15 MCG: 15 SOLUTION RESPIRATORY (INHALATION) at 08:55

## 2021-01-01 RX ADMIN — APIXABAN 5 MG: 5 TABLET, FILM COATED ORAL at 20:44

## 2021-01-01 RX ADMIN — APIXABAN 5 MG: 5 TABLET, FILM COATED ORAL at 21:01

## 2021-01-01 RX ADMIN — ENOXAPARIN SODIUM 90 MG: 100 INJECTION SUBCUTANEOUS at 21:36

## 2021-01-01 RX ADMIN — DIGOXIN 125 MCG: 0.25 TABLET ORAL at 09:38

## 2021-01-01 RX ADMIN — BUDESONIDE 500 MCG: 0.5 SUSPENSION RESPIRATORY (INHALATION) at 20:28

## 2021-01-01 RX ADMIN — APIXABAN 5 MG: 5 TABLET, FILM COATED ORAL at 07:49

## 2021-01-01 RX ADMIN — ATORVASTATIN CALCIUM 40 MG: 40 TABLET, FILM COATED ORAL at 20:31

## 2021-01-01 RX ADMIN — Medication 10 ML: at 00:22

## 2021-01-01 RX ADMIN — Medication 10 ML: at 21:58

## 2021-01-01 RX ADMIN — MICONAZOLE NITRATE: 2 OINTMENT TOPICAL at 07:54

## 2021-01-01 RX ADMIN — METOPROLOL SUCCINATE 75 MG: 50 TABLET, EXTENDED RELEASE ORAL at 22:38

## 2021-01-01 RX ADMIN — ARFORMOTEROL TARTRATE 15 MCG: 15 SOLUTION RESPIRATORY (INHALATION) at 08:02

## 2021-01-01 RX ADMIN — LEVOTHYROXINE SODIUM 25 MCG: 25 TABLET ORAL at 05:53

## 2021-01-01 RX ADMIN — MICONAZOLE NITRATE: 2 OINTMENT TOPICAL at 21:25

## 2021-01-01 RX ADMIN — MICONAZOLE NITRATE: 2 OINTMENT TOPICAL at 21:02

## 2021-01-01 RX ADMIN — MICONAZOLE NITRATE: 2 OINTMENT TOPICAL at 21:00

## 2021-01-01 RX ADMIN — DIGOXIN 125 MCG: 0.25 TABLET ORAL at 09:25

## 2021-01-01 RX ADMIN — METOPROLOL SUCCINATE 25 MG: 25 TABLET, FILM COATED, EXTENDED RELEASE ORAL at 07:49

## 2021-01-01 RX ADMIN — FOLIC ACID 1 MG: 1 TABLET ORAL at 09:37

## 2021-01-01 RX ADMIN — DIGOXIN 250 MCG: 0.25 TABLET ORAL at 09:35

## 2021-01-01 RX ADMIN — ARFORMOTEROL TARTRATE 15 MCG: 15 SOLUTION RESPIRATORY (INHALATION) at 09:15

## 2021-01-01 RX ADMIN — ATORVASTATIN CALCIUM 40 MG: 40 TABLET, FILM COATED ORAL at 21:58

## 2021-01-01 RX ADMIN — Medication 10 ML: at 10:16

## 2021-01-01 RX ADMIN — APIXABAN 5 MG: 5 TABLET, FILM COATED ORAL at 21:58

## 2021-01-01 RX ADMIN — METOPROLOL SUCCINATE 25 MG: 25 TABLET, FILM COATED, EXTENDED RELEASE ORAL at 09:38

## 2021-01-01 RX ADMIN — APIXABAN 5 MG: 5 TABLET, FILM COATED ORAL at 09:35

## 2021-01-01 RX ADMIN — APIXABAN 5 MG: 5 TABLET, FILM COATED ORAL at 09:40

## 2021-01-01 RX ADMIN — MICONAZOLE NITRATE: 2 OINTMENT TOPICAL at 20:31

## 2021-01-01 RX ADMIN — FOLIC ACID 1 MG: 1 TABLET ORAL at 09:35

## 2021-01-01 RX ADMIN — SODIUM CHLORIDE 250 ML: 9 INJECTION, SOLUTION INTRAVENOUS at 21:51

## 2021-01-01 RX ADMIN — PREDNISONE 2.5 MG: 5 TABLET ORAL at 09:38

## 2021-01-01 RX ADMIN — METOPROLOL SUCCINATE 25 MG: 25 TABLET, FILM COATED, EXTENDED RELEASE ORAL at 20:44

## 2021-01-01 RX ADMIN — Medication 10 ML: at 09:06

## 2021-01-01 RX ADMIN — MICONAZOLE NITRATE: 2 OINTMENT TOPICAL at 09:37

## 2021-01-01 RX ADMIN — METOPROLOL SUCCINATE 25 MG: 25 TABLET, FILM COATED, EXTENDED RELEASE ORAL at 09:35

## 2021-01-01 RX ADMIN — DIGOXIN 250 MCG: 250 INJECTION, SOLUTION INTRAMUSCULAR; INTRAVENOUS at 00:35

## 2021-01-01 RX ADMIN — ARFORMOTEROL TARTRATE 15 MCG: 15 SOLUTION RESPIRATORY (INHALATION) at 19:30

## 2021-01-01 RX ADMIN — Medication 10 ML: at 08:14

## 2021-01-01 RX ADMIN — PREDNISONE 2.5 MG: 5 TABLET ORAL at 10:16

## 2021-01-01 RX ADMIN — MICONAZOLE NITRATE: 2 OINTMENT TOPICAL at 08:46

## 2021-01-01 RX ADMIN — SODIUM CHLORIDE 1000 ML: 9 INJECTION, SOLUTION INTRAVENOUS at 14:53

## 2021-01-01 RX ADMIN — METOPROLOL SUCCINATE 25 MG: 25 TABLET, FILM COATED, EXTENDED RELEASE ORAL at 20:30

## 2021-01-01 RX ADMIN — Medication 10 ML: at 19:02

## 2021-01-01 RX ADMIN — ARFORMOTEROL TARTRATE 15 MCG: 15 SOLUTION RESPIRATORY (INHALATION) at 10:00

## 2021-01-01 RX ADMIN — APIXABAN 5 MG: 5 TABLET, FILM COATED ORAL at 09:06

## 2021-01-01 RX ADMIN — Medication 10 ML: at 21:25

## 2021-01-01 RX ADMIN — ATORVASTATIN CALCIUM 40 MG: 40 TABLET, FILM COATED ORAL at 21:00

## 2021-01-01 RX ADMIN — BUDESONIDE 500 MCG: 0.5 SUSPENSION RESPIRATORY (INHALATION) at 20:31

## 2021-01-01 RX ADMIN — ARFORMOTEROL TARTRATE 15 MCG: 15 SOLUTION RESPIRATORY (INHALATION) at 08:47

## 2021-01-01 RX ADMIN — BUDESONIDE 500 MCG: 0.5 SUSPENSION RESPIRATORY (INHALATION) at 08:55

## 2021-01-01 RX ADMIN — ARFORMOTEROL TARTRATE 15 MCG: 15 SOLUTION RESPIRATORY (INHALATION) at 20:09

## 2021-01-01 RX ADMIN — DIGOXIN 125 MCG: 125 TABLET ORAL at 09:24

## 2021-01-01 RX ADMIN — ARFORMOTEROL TARTRATE 15 MCG: 15 SOLUTION RESPIRATORY (INHALATION) at 07:51

## 2021-01-01 RX ADMIN — ARFORMOTEROL TARTRATE 15 MCG: 15 SOLUTION RESPIRATORY (INHALATION) at 20:40

## 2021-01-01 RX ADMIN — DIGOXIN 125 MCG: 125 TABLET ORAL at 10:16

## 2021-01-01 RX ADMIN — DIGOXIN 250 MCG: 250 INJECTION, SOLUTION INTRAMUSCULAR; INTRAVENOUS at 06:13

## 2021-01-01 RX ADMIN — Medication 10 ML: at 09:41

## 2021-01-01 RX ADMIN — METOPROLOL SUCCINATE 25 MG: 25 TABLET, EXTENDED RELEASE ORAL at 10:16

## 2021-01-01 RX ADMIN — FUROSEMIDE 40 MG: 10 INJECTION, SOLUTION INTRAVENOUS at 10:51

## 2021-01-01 RX ADMIN — FOLIC ACID 1 MG: 1 TABLET ORAL at 09:39

## 2021-01-01 RX ADMIN — MICONAZOLE NITRATE: 2 OINTMENT TOPICAL at 22:38

## 2021-01-01 RX ADMIN — APIXABAN 5 MG: 5 TABLET, FILM COATED ORAL at 20:31

## 2021-01-01 RX ADMIN — ARFORMOTEROL TARTRATE 15 MCG: 15 SOLUTION RESPIRATORY (INHALATION) at 08:35

## 2021-01-01 RX ADMIN — FOLIC ACID 1 MG: 1 TABLET ORAL at 07:49

## 2021-01-01 RX ADMIN — BUDESONIDE 500 MCG: 0.5 SUSPENSION RESPIRATORY (INHALATION) at 10:00

## 2021-01-01 RX ADMIN — Medication 10 ML: at 09:26

## 2021-01-01 RX ADMIN — ARFORMOTEROL TARTRATE 15 MCG: 15 SOLUTION RESPIRATORY (INHALATION) at 20:13

## 2021-01-01 RX ADMIN — ATORVASTATIN CALCIUM 40 MG: 40 TABLET, FILM COATED ORAL at 20:30

## 2021-01-01 RX ADMIN — MICONAZOLE NITRATE: 2 OINTMENT TOPICAL at 09:35

## 2021-01-01 RX ADMIN — Medication 1 MG: at 04:28

## 2021-01-01 RX ADMIN — ENOXAPARIN SODIUM 90 MG: 100 INJECTION SUBCUTANEOUS at 10:16

## 2021-01-01 RX ADMIN — ATORVASTATIN CALCIUM 40 MG: 40 TABLET, FILM COATED ORAL at 21:01

## 2021-01-01 RX ADMIN — ATORVASTATIN CALCIUM 40 MG: 40 TABLET, FILM COATED ORAL at 21:25

## 2021-01-01 RX ADMIN — METOPROLOL SUCCINATE 75 MG: 50 TABLET, EXTENDED RELEASE ORAL at 07:39

## 2021-01-01 RX ADMIN — BUDESONIDE 500 MCG: 0.5 SUSPENSION RESPIRATORY (INHALATION) at 20:33

## 2021-01-01 RX ADMIN — Medication 10 ML: at 08:45

## 2021-01-01 RX ADMIN — ARFORMOTEROL TARTRATE 15 MCG: 15 SOLUTION RESPIRATORY (INHALATION) at 22:30

## 2021-01-01 RX ADMIN — Medication 10 ML: at 18:58

## 2021-01-01 RX ADMIN — APIXABAN 5 MG: 5 TABLET, FILM COATED ORAL at 09:26

## 2021-01-01 RX ADMIN — ARFORMOTEROL TARTRATE 15 MCG: 15 SOLUTION RESPIRATORY (INHALATION) at 09:37

## 2021-01-01 RX ADMIN — ATORVASTATIN CALCIUM 40 MG: 40 TABLET, FILM COATED ORAL at 22:38

## 2021-01-01 RX ADMIN — DIGOXIN 250 MCG: 250 INJECTION, SOLUTION INTRAMUSCULAR; INTRAVENOUS at 12:11

## 2021-01-01 RX ADMIN — BUDESONIDE 500 MCG: 0.5 SUSPENSION RESPIRATORY (INHALATION) at 09:37

## 2021-01-01 RX ADMIN — BUDESONIDE 500 MCG: 0.5 SUSPENSION RESPIRATORY (INHALATION) at 07:51

## 2021-01-01 RX ADMIN — BUDESONIDE 500 MCG: 0.5 SUSPENSION RESPIRATORY (INHALATION) at 22:30

## 2021-01-01 RX ADMIN — LEVOTHYROXINE SODIUM 25 MCG: 25 TABLET ORAL at 05:51

## 2021-01-01 RX ADMIN — MICONAZOLE NITRATE: 2 OINTMENT TOPICAL at 10:16

## 2021-01-01 RX ADMIN — Medication 10 ML: at 10:48

## 2021-01-01 RX ADMIN — MICONAZOLE NITRATE: 2 OINTMENT TOPICAL at 10:48

## 2021-01-01 RX ADMIN — FOLIC ACID 1 MG: 1 TABLET ORAL at 09:24

## 2021-01-01 RX ADMIN — MICONAZOLE NITRATE: 2 OINTMENT TOPICAL at 00:22

## 2021-01-01 RX ADMIN — METOPROLOL SUCCINATE 25 MG: 25 TABLET, FILM COATED, EXTENDED RELEASE ORAL at 09:25

## 2021-01-01 RX ADMIN — ATORVASTATIN CALCIUM 40 MG: 40 TABLET, FILM COATED ORAL at 21:36

## 2021-01-01 RX ADMIN — APIXABAN 5 MG: 5 TABLET, FILM COATED ORAL at 09:38

## 2021-01-01 RX ADMIN — ARFORMOTEROL TARTRATE 15 MCG: 15 SOLUTION RESPIRATORY (INHALATION) at 21:18

## 2021-01-01 RX ADMIN — BUDESONIDE 500 MCG: 0.5 SUSPENSION RESPIRATORY (INHALATION) at 08:02

## 2021-01-01 RX ADMIN — PREDNISONE 2.5 MG: 5 TABLET ORAL at 09:05

## 2021-01-01 RX ADMIN — METOPROLOL SUCCINATE 25 MG: 25 TABLET, FILM COATED, EXTENDED RELEASE ORAL at 20:31

## 2021-01-01 RX ADMIN — ARFORMOTEROL TARTRATE 15 MCG: 15 SOLUTION RESPIRATORY (INHALATION) at 08:38

## 2021-01-01 RX ADMIN — PREDNISONE 2.5 MG: 5 TABLET ORAL at 08:44

## 2021-01-01 RX ADMIN — Medication 10 ML: at 09:39

## 2021-01-01 RX ADMIN — ARFORMOTEROL TARTRATE 15 MCG: 15 SOLUTION RESPIRATORY (INHALATION) at 20:30

## 2021-01-01 RX ADMIN — DIGOXIN 125 MCG: 0.25 TABLET ORAL at 07:48

## 2021-01-01 RX ADMIN — SODIUM CHLORIDE 500 ML: 9 INJECTION, SOLUTION INTRAVENOUS at 16:42

## 2021-01-01 RX ADMIN — MICONAZOLE NITRATE: 2 OINTMENT TOPICAL at 21:36

## 2021-01-01 RX ADMIN — APIXABAN 5 MG: 5 TABLET, FILM COATED ORAL at 10:47

## 2021-01-01 RX ADMIN — Medication 10 ML: at 11:10

## 2021-01-01 RX ADMIN — ARFORMOTEROL TARTRATE 15 MCG: 15 SOLUTION RESPIRATORY (INHALATION) at 19:24

## 2021-01-01 RX ADMIN — LEVOTHYROXINE SODIUM 25 MCG: 25 TABLET ORAL at 06:09

## 2021-01-01 RX ADMIN — Medication 10 ML: at 20:09

## 2021-01-01 RX ADMIN — ASPIRIN 81 MG: 81 TABLET, COATED ORAL at 10:16

## 2021-01-01 RX ADMIN — BUDESONIDE 500 MCG: 0.5 SUSPENSION RESPIRATORY (INHALATION) at 08:38

## 2021-01-01 RX ADMIN — ATORVASTATIN CALCIUM 40 MG: 40 TABLET, FILM COATED ORAL at 20:43

## 2021-01-01 RX ADMIN — MICONAZOLE NITRATE: 2 OINTMENT TOPICAL at 09:41

## 2021-01-01 RX ADMIN — METOPROLOL SUCCINATE 25 MG: 25 TABLET, EXTENDED RELEASE ORAL at 21:36

## 2021-01-01 RX ADMIN — METOPROLOL SUCCINATE 50 MG: 50 TABLET, EXTENDED RELEASE ORAL at 09:24

## 2021-01-01 RX ADMIN — MICONAZOLE NITRATE: 2 OINTMENT TOPICAL at 09:36

## 2021-01-01 RX ADMIN — ACETAMINOPHEN 650 MG: 325 TABLET ORAL at 15:48

## 2021-01-01 RX ADMIN — PREDNISONE 2.5 MG: 5 TABLET ORAL at 10:48

## 2021-01-01 RX ADMIN — APIXABAN 5 MG: 5 TABLET, FILM COATED ORAL at 21:25

## 2021-01-01 RX ADMIN — LEVOTHYROXINE SODIUM 25 MCG: 25 TABLET ORAL at 05:03

## 2021-01-01 RX ADMIN — FOLIC ACID 1 MG: 1 TABLET ORAL at 10:16

## 2021-01-01 RX ADMIN — LEVOTHYROXINE SODIUM 25 MCG: 25 TABLET ORAL at 05:17

## 2021-01-01 RX ADMIN — DIGOXIN 125 MCG: 125 TABLET ORAL at 07:39

## 2021-01-01 RX ADMIN — BUDESONIDE 500 MCG: 0.5 SUSPENSION RESPIRATORY (INHALATION) at 20:13

## 2021-01-01 RX ADMIN — FOLIC ACID 1 MG: 1 TABLET ORAL at 08:13

## 2021-01-01 RX ADMIN — DILTIAZEM HYDROCHLORIDE 25 MG: 5 INJECTION INTRAVENOUS at 13:32

## 2021-01-01 RX ADMIN — BUDESONIDE 500 MCG: 0.5 SUSPENSION RESPIRATORY (INHALATION) at 20:34

## 2021-01-01 RX ADMIN — MICONAZOLE NITRATE: 2 OINTMENT TOPICAL at 09:07

## 2021-01-01 RX ADMIN — FOLIC ACID 1 MG: 1 TABLET ORAL at 09:25

## 2021-01-01 RX ADMIN — BUDESONIDE 500 MCG: 0.5 SUSPENSION RESPIRATORY (INHALATION) at 19:30

## 2021-01-01 RX ADMIN — Medication 10 ML: at 21:36

## 2021-01-01 RX ADMIN — BUDESONIDE 500 MCG: 0.5 SUSPENSION RESPIRATORY (INHALATION) at 20:09

## 2021-01-01 RX ADMIN — PREDNISONE 2.5 MG: 5 TABLET ORAL at 09:35

## 2021-01-01 RX ADMIN — ARFORMOTEROL TARTRATE 15 MCG: 15 SOLUTION RESPIRATORY (INHALATION) at 20:34

## 2021-01-01 RX ADMIN — DIGOXIN 250 MCG: 250 INJECTION, SOLUTION INTRAMUSCULAR; INTRAVENOUS at 18:11

## 2021-01-01 RX ADMIN — Medication 10 ML: at 17:15

## 2021-01-01 RX ADMIN — METOPROLOL SUCCINATE 50 MG: 50 TABLET, EXTENDED RELEASE ORAL at 21:25

## 2021-01-01 RX ADMIN — Medication 10 ML: at 20:44

## 2021-01-01 RX ADMIN — DIGOXIN 125 MCG: 125 TABLET ORAL at 12:27

## 2021-01-01 RX ADMIN — Medication 10 ML: at 16:43

## 2021-01-01 RX ADMIN — FOLIC ACID 1 MG: 1 TABLET ORAL at 09:05

## 2021-01-01 RX ADMIN — LEVOTHYROXINE SODIUM 25 MCG: 25 TABLET ORAL at 05:01

## 2021-01-01 RX ADMIN — MICONAZOLE NITRATE: 2 OINTMENT TOPICAL at 09:27

## 2021-01-01 RX ADMIN — Medication 10 ML: at 07:54

## 2021-01-01 RX ADMIN — APIXABAN 5 MG: 5 TABLET, FILM COATED ORAL at 08:44

## 2021-01-01 RX ADMIN — APIXABAN 5 MG: 5 TABLET, FILM COATED ORAL at 09:25

## 2021-01-01 RX ADMIN — BUDESONIDE 500 MCG: 0.5 SUSPENSION RESPIRATORY (INHALATION) at 09:15

## 2021-01-01 RX ADMIN — METHYLPREDNISOLONE ACETATE 40 MG: 40 INJECTION, SUSPENSION INTRA-ARTICULAR; INTRALESIONAL; INTRAMUSCULAR; SOFT TISSUE at 15:43

## 2021-01-01 RX ADMIN — LEVOTHYROXINE SODIUM 25 MCG: 25 TABLET ORAL at 06:00

## 2021-01-01 RX ADMIN — PREDNISONE 2.5 MG: 5 TABLET ORAL at 07:48

## 2021-01-01 RX ADMIN — PREDNISONE 2.5 MG: 5 TABLET ORAL at 09:26

## 2021-01-01 RX ADMIN — ASPIRIN 81 MG: 81 TABLET, COATED ORAL at 08:13

## 2021-01-01 RX ADMIN — BUDESONIDE 500 MCG: 0.5 SUSPENSION RESPIRATORY (INHALATION) at 20:40

## 2021-01-01 RX ADMIN — DIGOXIN 125 MCG: 0.25 INJECTION INTRAMUSCULAR; INTRAVENOUS at 19:02

## 2021-01-01 ASSESSMENT — ENCOUNTER SYMPTOMS
TROUBLE SWALLOWING: 0
GASTROINTESTINAL NEGATIVE: 1
EYES NEGATIVE: 1
BLOOD IN STOOL: 0
DIARRHEA: 0
GASTROINTESTINAL NEGATIVE: 1
ABDOMINAL PAIN: 0
ALLERGIC/IMMUNOLOGIC NEGATIVE: 1
CONSTIPATION: 0
ALLERGIC/IMMUNOLOGIC NEGATIVE: 1
VOMITING: 0
EYES NEGATIVE: 1
SORE THROAT: 0
COUGH: 0
CHEST TIGHTNESS: 0
NAUSEA: 0
RHINORRHEA: 0
SHORTNESS OF BREATH: 1
SHORTNESS OF BREATH: 0
SHORTNESS OF BREATH: 1
COUGH: 1

## 2021-01-01 ASSESSMENT — PATIENT HEALTH QUESTIONNAIRE - PHQ9
SUM OF ALL RESPONSES TO PHQ QUESTIONS 1-9: 0
SUM OF ALL RESPONSES TO PHQ9 QUESTIONS 1 & 2: 0
1. LITTLE INTEREST OR PLEASURE IN DOING THINGS: 0

## 2021-01-01 ASSESSMENT — PAIN SCALES - GENERAL
PAINLEVEL_OUTOF10: 0

## 2021-01-01 NOTE — TELEPHONE ENCOUNTER
H switched to a different antibiotic. Have him continue taking his doxycycline 1 a day and I added a something else. Please make sure he picks up a probiotic over-the-counter and take that twice a day as this antibiotic could possibly give diarrhea. If that develops stop and notify us. I would like to see him in the office in early next week if he is not feeling better.   If he is bad today I would want to see him today
Notify Covid negative. I am sending a more aggressive medication.   If is not markedly better by tomorrow Julia Sena
Patient spouse states that he is already takes doxycycline twice  Day forever, is this what he should still take or a different antibiotic? Thank you.
Pt. Spouse notified, verbalized understanding.
Statement Selected

## 2021-04-08 PROBLEM — E43 SEVERE PROTEIN-CALORIE MALNUTRITION (HCC): Chronic | Status: RESOLVED | Noted: 2020-04-22 | Resolved: 2021-01-01

## 2021-04-08 NOTE — PROGRESS NOTES
21  Name: Bernard Berger    : 1935    Sex: male    Age: 80 y.o. Subjective:  Chief Complaint: Patient is here for [facility].     Here with wife     4  Days  Of  Cough   No   Cp  Or sob          Review of Systems   Constitutional: Negative. Negative for chills, diaphoresis, fatigue and fever. HENT: Negative. Eyes: Negative. Respiratory: Positive for cough. Negative for shortness of breath. Cardiovascular: Negative. Gastrointestinal: Negative. Endocrine: Negative. Genitourinary: Negative. Musculoskeletal: Negative. Skin: Negative. Allergic/Immunologic: Negative. Neurological: Negative. Hematological: Negative. Psychiatric/Behavioral: Negative. Current Outpatient Medications:     predniSONE (DELTASONE) 2.5 MG tablet, Take 1 tablet by mouth daily, Disp: 90 tablet, Rfl: 5    doxycycline hyclate (VIBRA-TABS) 100 MG tablet, Take 1 tablet by mouth 2 times daily for 10 days, Disp: 20 tablet, Rfl: 0    fluticasone-salmeterol (ADVAIR DISKUS) 500-50 MCG/DOSE diskus inhaler, Inhale 1 puff into the lungs every 12 hours, Disp: 60 each, Rfl: 3    Handicap Placard MISC, by Does not apply route Dx  Arthritis    5  yrs, Disp: 1 each, Rfl: 0    vitamin D (ERGOCALCIFEROL) 1.25 MG (16617 UT) CAPS capsule, One  Twice a week, Disp: 24 capsule, Rfl: 5    albuterol (PROVENTIL) (2.5 MG/3ML) 0.083% nebulizer solution, Take 3 mLs by nebulization every 6 hours as needed for Wheezing, Disp: 120 each, Rfl: 3    doxycycline hyclate (VIBRA-TABS) 100 MG tablet, Take 1 tablet by mouth 2 times daily, Disp: 180 tablet, Rfl: 5    aspirin EC 81 MG EC tablet, Take 1 tablet by mouth daily for 25 days, Disp: 25 tablet, Rfl: 0    hydrocortisone 2.5 % cream, Apply topically 2 times daily. , Disp: 2 Tube, Rfl: 12    folic acid (FOLVITE) 1 MG tablet, Take 1 tablet by mouth daily, Disp: 30 tablet, Rfl: 3    escitalopram (LEXAPRO) 10 MG tablet, Take 1 tablet by mouth daily, INTRAOPERATIVE FLUOROSCOPY) performed by Rosemary Funes MD at [facility]      10/10      Vitals:    04/08/21 1512   BP: 132/78   Pulse: 102   Temp: 98.6 °F (37 °C)   TempSrc: Oral   SpO2: 92%   Weight: 203 lb (92.1 kg)       Objective:    Physical Exam  Vitals signs reviewed. Constitutional:       Appearance: He is well-developed. HENT:      Head: Normocephalic. Eyes:      Pupils: Pupils are equal, round, and reactive to light. Neck:      Musculoskeletal: Normal range of motion. Cardiovascular:      Rate and Rhythm: Normal rate and regular rhythm. Pulmonary:      Effort: Pulmonary effort is normal. No respiratory distress. Breath sounds: Normal breath sounds. No wheezing or rales. Abdominal:      Palpations: Abdomen is soft. Musculoskeletal: Normal range of motion. Skin:     General: Skin is warm. Neurological:      Mental Status: He is alert and oriented to person, place, and time. Psychiatric:         Behavior: Behavior normal.         Jay Calvillo was seen today for cough and shortness of breath. Diagnoses and all orders for this visit:    Acute bronchitis due to other specified organisms  -     predniSONE (DELTASONE) 2.5 MG tablet; Take 1 tablet by mouth daily  -     doxycycline hyclate (VIBRA-TABS) 100 MG tablet; Take 1 tablet by mouth 2 times daily for 10 days  -     methylPREDNISolone acetate (DEPO-MEDROL) injection 40 mg    COPD without exacerbation (HCC)        Comments: med  wrose toer  Not use  Alb  More then   4 h a nd ok  Lab neb qid        cotn  advair   Worse toe r  Get   oximreter  A great deal of time spent reviewing medications, diet, exercise, social issues. Also reviewing the chart before entering the room with patient and finishing charting after leaving patient's room. More than half of that time was spent face to face with the patient in counseling and coordinating care.     If o2  Below  90 to er       Follow Up: Return in about 2 days (around 4/10/2021), or if symptoms worsen or fail to improve.      Seen by:  Jasvir Farr, DO

## 2021-04-08 NOTE — TELEPHONE ENCOUNTER
Wife called in wanting to schedule an appt with Dr Teresa Duque. She states that pt has been having a cough and difficulty breathing for the last 3 days. She states that she can hear rattling in his chest.  Call was transferred to [facility] nurse access.

## 2021-04-12 NOTE — TELEPHONE ENCOUNTER
Wife calling with update. She states pts SOB is better as well as chest congestion better.  She is asking if he needs to continue nebulizer or can he D/C it

## 2021-05-04 NOTE — PROGRESS NOTES
21  Name: Skyler Gilman    : 1935    Sex: male    Age: 80 y.o. Subjective:  Chief Complaint: Patient is here for cough    sob     Here with wife    Sl cough this am   Wife in room she upset causye he used   3 and a half   inahels in a month  He nto using inhlater   [facility] and one for m,his son      Review of Systems   Constitutional: Negative. HENT: Negative. Eyes: Negative. Respiratory: Positive for shortness of breath (with exertion). Cardiovascular: Negative. Gastrointestinal: Negative. Endocrine: Negative. Genitourinary: Negative. Skin: Negative. Allergic/Immunologic: Negative. Neurological: Negative. Hematological: Negative. Psychiatric/Behavioral: Negative. Current Outpatient Medications:     escitalopram (LEXAPRO) 10 MG tablet, Take 1 tablet by mouth daily, Disp: 90 tablet, Rfl: 5    doxycycline hyclate (VIBRA-TABS) 100 MG tablet, Take 1 tablet by mouth 2 times daily, Disp: 180 tablet, Rfl: 5    levothyroxine (SYNTHROID) 25 MCG tablet, Take 1 tablet by mouth daily, Disp: 90 tablet, Rfl: 5    predniSONE (DELTASONE) 2.5 MG tablet, Take 1 tablet by mouth daily, Disp: 90 tablet, Rfl: 5    albuterol sulfate HFA (VENTOLIN HFA) 108 (90 Base) MCG/ACT inhaler, Inhale 2 puffs into the lungs 4 times daily as needed for Wheezing, Disp: 1 Inhaler, Rfl: 5    fluticasone-salmeterol (ADVAIR DISKUS) 500-50 MCG/DOSE diskus inhaler, Inhale 1 puff into the lungs every 12 hours, Disp: 60 each, Rfl: 3    albuterol (PROVENTIL) (2.5 MG/3ML) 0.083% nebulizer solution, Take 3 mLs by nebulization every 6 hours as needed for Wheezing, Disp: 120 each, Rfl: 3    vitamin D (ERGOCALCIFEROL) 1.25 MG (58247 UT) CAPS capsule, One  Twice a week, Disp: 24 capsule, Rfl: 5    aspirin EC 81 MG EC tablet, Take 1 tablet by mouth daily for 25 days, Disp: 25 tablet, Rfl: 0    hydrocortisone 2.5 % cream, Apply topically 2 times daily. , Disp: 2 Tube, Rfl: 12    folic acid (FOLVITE) 1 MG tablet, Take 1 tablet by mouth daily, Disp: 30 tablet, Rfl: 3    PROAIR  (90 Base) MCG/ACT inhaler, , Disp: , Rfl:   Allergies   Allergen Reactions    Cephalexin     Sulfa Antibiotics      Social History     Socioeconomic History    Marital status:      Spouse name: Not on file    Number of children: Not on file    Years of education: Not on file    Highest education level: Not on file   Occupational History    Occupation: retired     Comment: self employed   Social Needs    Financial resource strain: Not on file    Food insecurity     Worry: Not on file     Inability: Not on file   [employer] needs     Medical: Not on file     Non-medical: Not on file   Tobacco Use    Smoking status: Former Smoker     Years: 20.00     Types: Cigars    Smokeless tobacco: Never Used    Tobacco comment: quit smoking 40 years ago   Substance and Sexual Activity    Alcohol use: Not on file    Drug use: No    Sexual activity: Not Currently     Partners: Female   Lifestyle    Physical activity     Days per week: Not on file     Minutes per session: Not on file    Stress: Not on file   Relationships    Social connections     Talks on phone: Not on file     Gets together: Not on file     Attends Taoism service: Not on file     Active member of club or organization: Not on file     Attends meetings of clubs or organizations: Not on file     Relationship status: Not on file    Intimate partner violence     Fear of current or ex partner: Not on file     Emotionally abused: Not on file     Physically abused: Not on file     Forced sexual activity: Not on file   Other Topics Concern    Not on file   Social History Narrative        COPD    TICS    COLON POLYP    HTN    LIPID    [employer] [place]    ANX/DEP    EARLY HYPOTHYROID    EARLY DIET DM    COLON 12-99    TMT 1-06    CA BLADDER---ARLINE----DR BRYANT    BPH    EGD 12-05 MATT    FRACTURE L FEMUR SHAFT 4-10    L KNEE OR 12-10 TO CLEAR OUT INFECTION-DR ECKERT--LIFELONG DOXY BID    [employer] EVERY DAY AND THREE [employer] BEERS WHEN HE GETS HOME TOLD TO ME BY    WIFE 12    PARALYSIS DIAPHRAGM WITH DR HUMMEL 9-15----THEN DR VILLALOBOS    MOTHER  AT 89    DAD  AT 79 CAD    VIT D DEFIC 10-    HYPOTHYROID 2-17    HAS 4 CA    PULM DR VELEZ    RHEUM DR NEWTON    ONE BEER AT [employer] DAILY AND TWO AT HOME DAILY    FRAC LEFT FEMUR  DR ECKERT----OLD HARDWARE OUT      Past Medical History:   Diagnosis Date    Anxiety     Asthma     [facility]     Chronic sinusitis     Closed displaced midcervical fracture of left femur ([facility]     Complication of internal orthopedic device, initial encounter ([facility] 2018    Hardware not a problem, just in the way of fixation of broken hip, must be removed to insert hemiarthroplasty    Constipation, chronic     COPD     Degenerative joint disease of left knee 5/15/2018    Depression     Gastritis     hx of gastritis    Hearing loss     Hyperlipidemia     Hypertension     Hypothyroidism     Iron deficiency anemia     Left leg pain 7/15/2019    Prediabetes     Type 2 diabetes mellitus without complication ([facility]     Vitamin D deficiency      Family History   Problem Relation Age of Onset    Arthritis Mother     Cancer Mother         glaucoma    Heart Disease Father     Coronary Art Dis Father     Emphysema Father     Diabetes Other     Arthritis Son       Past Surgical History:   Procedure Laterality Date    COLONOSCOPY      FEMUR FRACTURE SURGERY  2010    IM nailing left femoral shaft    FRACTURE SURGERY      [place] PARTIAL HIP REPLACEMENT Left 2018    HIP HEMIARTHROPLASTY LEFT, WITH  REMOVAL OF PREVIOUS HARDWARE, INSERTION OF MEDICATION DELIVERY SYSTEM (WITH INTRAOPERATIVE FLUOROSCOPY) performed by Micaela Patel MD at [facility] ENDOSCOPY      10/10      Vitals:    21 1527   BP: 128/77   Pulse: 75   Temp: 98.7 °F (37.1 °C)   TempSrc: Oral   SpO2: 92%   Weight: 201 lb (91.2 kg)   Height: 6' 3\" (1.905 m)       Objective:    Physical Exam  Vitals signs reviewed. Constitutional:       Appearance: He is well-developed. HENT:      Head: Normocephalic. Eyes:      Pupils: Pupils are equal, round, and reactive to light. Neck:      Musculoskeletal: Normal range of motion. Cardiovascular:      Rate and Rhythm: Normal rate and regular rhythm. Pulmonary:      Effort: Pulmonary effort is normal. No respiratory distress. Breath sounds: Normal breath sounds. No wheezing, rhonchi or rales. Abdominal:      Palpations: Abdomen is soft. Skin:     General: Skin is warm. Neurological:      Mental Status: He is alert and oriented to person, place, and time. Psychiatric:         Behavior: Behavior normal.         March Ena was seen today for other. Diagnoses and all orders for this visit:    COPD without exacerbation ([facility]  -     AFL ([facility]) - Giselle Muir MD,  Pulmonary, [place]    Anxiety  -     escitalopram (LEXAPRO) 10 MG tablet; Take 1 tablet by mouth daily    Chronic osteomyelitis of left femur (HCC)  -     doxycycline hyclate (VIBRA-TABS) 100 MG tablet; Take 1 tablet by mouth 2 times daily    Acquired hypothyroidism  -     levothyroxine (SYNTHROID) 25 MCG tablet; Take 1 tablet by mouth daily    Acute bronchitis due to other specified organisms  -     predniSONE (DELTASONE) 2.5 MG tablet; Take 1 tablet by mouth daily    Essential hypertension    Other orders  -     albuterol sulfate HFA (VENTOLIN HFA) 108 (90 Base) MCG/ACT inhaler; Inhale 2 puffs into the lungs 4 times daily as needed for Wheezing  -     fluticasone-salmeterol (ADVAIR DISKUS) 500-50 MCG/DOSE diskus inhaler; Inhale 1 puff into the lungs every 12 hours  -     albuterol (PROVENTIL) (2.5 MG/3ML) 0.083% nebulizer solution;  Take 3 mLs by nebulization every 6 hours as needed for Wheezing        Comments: appt pulm   encosurge dec alb to qid prn      If  Inc sob  To er  A great deal of time spent reviewing medications, diet, exercise, social issues. Also reviewing the chart before entering the room with patient and finishing charting after leaving patient's room. More than half of that time was spent face to face with the patient in counseling and coordinating care. Cont  The chronic    Doxy for     Leg      Check blood pressure at home twice a day. Low-salt low caffeine diet. Call if systolic blood pressure is above 360 and diastolic blood pressures above 85. Only use a upper arm digital cuff. Follow Up: Return for Lab Before, Reg Appt.      Seen by:  Kodak Rutherford DO

## 2021-05-13 PROBLEM — I48.91 ATRIAL FIBRILLATION WITH RVR (HCC): Status: ACTIVE | Noted: 2021-01-01

## 2021-05-13 NOTE — ED NOTES
Bed: 19  Expected date:   Expected time:   Means of arrival:   Comments:  EMS new onset 1100 José Bartlett RN  05/13/21 3187

## 2021-05-13 NOTE — H&P
Department of Internal Medicine  General Internal Medicine  Attending History and Physical      CHIEF COMPLAINT:  Fast heart rate    Reason for Admission:  Atrial fibrillation with RVR    History Obtained From:  patient    HISTORY OF PRESENT ILLNESS:      The patient is a 80 y.o. male with significant past medical history of COPD who presents with atrial fibrillation with rvr. Patient was noted to be at his PCP office today and was noted to have atrial fibrillation. This is newly detected. He was sent to the hospital for further evaluation. In the ER, he was give Cardizem. He is still tachycardia, but BP dropped. He is being admitted for management of atrial fibrillation. Past Medical History:        Diagnosis Date    Anxiety     Asthma     Bladder cancer ([address]     Chronic sinusitis     Closed displaced midcervical fracture of left femur ([address] 1/93/5552    Complication of internal orthopedic device, initial encounter ([address] 5/14/2018    Hardware not a problem, just in the way of fixation of broken hip, must be removed to insert hemiarthroplasty    Constipation, chronic     COPD     Degenerative joint disease of left knee 5/15/2018    Depression     Gastritis     hx of gastritis    Hearing loss     Hyperlipidemia     Hypertension     Hypothyroidism     Iron deficiency anemia     Left leg pain 7/15/2019    Prediabetes     Type 2 diabetes mellitus without complication ([address]     Vitamin D deficiency      Past Surgical History:        Procedure Laterality Date    COLONOSCOPY      FEMUR FRACTURE SURGERY  04/12/2010    IM nailing left femoral shaft    FRACTURE SURGERY      [place] PARTIAL HIP REPLACEMENT Left 5/14/2018    HIP HEMIARTHROPLASTY LEFT, WITH  REMOVAL OF PREVIOUS HARDWARE, INSERTION OF MEDICATION DELIVERY SYSTEM (WITH INTRAOPERATIVE FLUOROSCOPY) performed by Jerrell Williamson MD at [facility]      10/10     Medications Prior to Admission:    1. Synthroid  2. wheezing  CARDIOVASCULAR:  Normal apical impulse, irregular rate and rhythm, normal S1 and S2, no S3 or S4, and no murmur noted  ABDOMEN:  No scars, normal bowel sounds, soft, non-distended, non-tender, no masses palpated, no hepatosplenomegally  MUSCULOSKELETAL: Bilateral lower extremity edema  NEUROLOGIC:  Grossly intact  SKIN:  no bruising or bleeding and normal skin color, texture, turgor    DATA:  CBC:   Lab Results   Component Value Date    WBC 5.2 05/13/2021    RBC 3.80 05/13/2021    HGB 13.5 05/13/2021    HCT 42.1 05/13/2021    .8 05/13/2021    MCH 35.5 05/13/2021    MCHC 32.1 05/13/2021    RDW 15.2 05/13/2021     05/13/2021    MPV 10.5 05/13/2021     CMP:    Lab Results   Component Value Date     05/13/2021    K 4.3 05/13/2021     05/13/2021    CO2 29 05/13/2021    BUN 25 05/13/2021    CREATININE 0.7 05/13/2021    GFRAA >60 05/13/2021    LABGLOM >60 05/13/2021    GLUCOSE 122 05/13/2021    GLUCOSE 101 10/09/2010    PROT 7.5 05/13/2021    LABALBU 3.6 05/13/2021    LABALBU 2.8 10/07/2010    CALCIUM 9.6 05/13/2021    BILITOT 1.3 05/13/2021    ALKPHOS 51 05/13/2021    AST 40 05/13/2021    ALT 19 05/13/2021     ASSESSMENT AND PLAN:        1. Atrial fibrillation with RVR ([address]):  Rate still elevated. Unfortunately Bp is on the low side  Will give digoxin and monitor level  Hold of on anticoagulation for now. Consult placed to cardiology  Check TSH    2. Congestive Heart failure:  Positive bilateral lower extremity edema  Elevated BNP  ECHO ordered  Will hold off on lasix unless clinical symptoms present for over dyspnea. Low Na diet  Cardiac rehab as outpatient    3. Hypothyroidism: on synthroid  Continue to monitor    4. COPD:  Not in exacerbation  Resume home breathing treatment    5. Hx of Depression: continue lexapro    Patient is at high risk for delirium while at hospital. Ensure adequate sleep at night and reduce amount of noise. Also ensure maintaining circadian Rhythm.

## 2021-05-13 NOTE — PROGRESS NOTES
Database complete. Medication reconciliation being performed by med tech Margarito Loco. Care plans and education initiated. Hearing impaired. Known to [address] [place] [address].

## 2021-05-13 NOTE — ED PROVIDER NOTES
54-year-old male patient presented to ER from [facility] for irregular heart rate. She was seen at the clinic today for leg swelling and shortness of breath. Patient's was found to be in new onset A. Fib with RVR. Patient was transferred to the ER for evaluation and management. Upon presentation patient heart rate was 146, patient reported some shortness of breath. Symptoms are mild in severity. Worse with activity and exertion. No alleviating factors. No treatment prior to arrival.  This is been constant since onset. Patient denied any chest pain , fever, chills, nausea, vomiting, abdominal pain, he denied any fall. Patient stated that he has been noticing some leg swelling worse on his right side. Patient was alert oriented x3. Review of Systems   Constitutional: Negative for chills and fever. HENT: Negative for hearing loss, rhinorrhea, sore throat and trouble swallowing. Eyes: Negative for visual disturbance. Respiratory: Positive for shortness of breath. Negative for cough and chest tightness. Cardiovascular: Negative for chest pain, palpitations and leg swelling. Gastrointestinal: Negative for abdominal pain, blood in stool, constipation, diarrhea, nausea and vomiting. Endocrine: Negative for polyuria. Genitourinary: Negative for hematuria. Musculoskeletal: Negative for arthralgias and joint swelling. Skin: Negative for rash and wound. Neurological: Negative for dizziness, tremors, syncope, weakness, light-headedness and headaches. Psychiatric/Behavioral: Negative for decreased concentration and sleep disturbance. All other systems reviewed and are negative. Physical Exam  Constitutional:       Appearance: Normal appearance. HENT:      Head: Normocephalic and atraumatic.       Right Ear: External ear normal.      Left Ear: External ear normal.      Nose: Nose normal.      Mouth/Throat:      Mouth: Mucous membranes are moist.   Eyes:      General: No scleral icterus. Pupils: Pupils are equal, round, and reactive to light. Cardiovascular:      Rate and Rhythm: Tachycardia present. Rhythm irregular. Heart sounds: No murmur heard. No friction rub. No gallop. Pulmonary:      Effort: Pulmonary effort is normal.      Breath sounds: Normal breath sounds. No wheezing, rhonchi or rales. Abdominal:      General: Abdomen is flat. Bowel sounds are normal.      Palpations: Abdomen is soft. Tenderness: There is no abdominal tenderness. Musculoskeletal:         General: Normal range of motion. Cervical back: Normal range of motion and neck supple. Right lower leg: Edema present. Left lower leg: Edema present. Skin:     General: Skin is warm. Findings: No bruising, lesion or rash. Neurological:      General: No focal deficit present. Mental Status: He is alert and oriented to person, place, and time. Psychiatric:         Mood and Affect: Mood normal.         Behavior: Behavior normal.          Procedures     [facility]     ED Course as of May 17 1916   Thu May 13, 2021   1459 BP: 107/72 [SS]      ED Course User Index  [SS] Jose Elias Jimenez MD      Patient presents to the ED for New onset A. Fib RVR. Differential diagnoses included but not limited to A FIb. Workup in the ED revealed BNP 1360. Patient was given Cardizem 25 mg for their symptoms with saline with mild improvement. Patient requires continued workup and management of their symptoms and will be admitted to the hospital for further evaluation and treatment.       ED Course as of May 17 1916   Thu May 13, 2021   1459 BP: 107/72 [SS]      ED Course User Index  [SS] Jose Elias Jimenez MD       --------------------------------------------- PAST HISTORY ---------------------------------------------  Past Medical History:  has a past medical history of Acute on chronic diastolic congestive heart failure ([facility] Anxiety, Asthma, Bladder cancer ([facility] [facility] Chronic sinusitis, Closed - 12.0 %    Eosinophils % 0.6 0.0 - 6.0 %    Basophils % 0.8 0.0 - 2.0 %    Neutrophils Absolute 3.59 1.80 - 7.30 E9/L    Immature Granulocytes # 0.03 E9/L    Lymphocytes Absolute 0.72 (L) 1.50 - 4.00 E9/L    Monocytes Absolute 0.80 0.10 - 0.95 E9/L    Eosinophils Absolute 0.03 (L) 0.05 - 0.50 E9/L    Basophils Absolute 0.04 0.00 - 0.20 E9/L    Anisocytosis 1+    Comprehensive Metabolic Panel w/ Reflex to MG   Result Value Ref Range    Sodium 143 132 - 146 mmol/L    Potassium reflex Magnesium 4.3 3.5 - 5.0 mmol/L    Chloride 107 98 - 107 mmol/L    CO2 29 22 - 29 mmol/L    Anion Gap 7 7 - 16 mmol/L    Glucose 122 (H) 74 - 99 mg/dL    BUN 25 (H) 6 - 23 mg/dL    CREATININE 0.7 0.7 - 1.2 mg/dL    GFR Non-African American >60 >=60 mL/min/1.73    GFR African American >60     Calcium 9.6 8.6 - 10.2 mg/dL    Total Protein 7.5 6.4 - 8.3 g/dL    Albumin 3.6 3.5 - 5.2 g/dL    Total Bilirubin 1.3 (H) 0.0 - 1.2 mg/dL    Alkaline Phosphatase 51 40 - 129 U/L    ALT 19 0 - 40 U/L    AST 40 (H) 0 - 39 U/L   Troponin   Result Value Ref Range    Troponin 0.02 0.00 - 0.03 ng/mL   Protime-INR   Result Value Ref Range    Protime 14.9 (H) 9.3 - 12.4 sec    INR 1.3    APTT   Result Value Ref Range    aPTT 34.5 24.5 - 35.1 sec   Brain Natriuretic Peptide   Result Value Ref Range    Pro-BNP 1,360 (H) 0 - 450 pg/mL   TSH without Reflex   Result Value Ref Range    TSH 1.850 0.270 - 4.200 uIU/mL   Protime-INR   Result Value Ref Range    Protime 14.9 (H) 9.3 - 12.4 sec    INR 1.3    Comprehensive Metabolic Panel w/ Reflex to MG   Result Value Ref Range    Sodium 139 132 - 146 mmol/L    Potassium reflex Magnesium 4.3 3.5 - 5.0 mmol/L    Chloride 107 98 - 107 mmol/L    CO2 26 22 - 29 mmol/L    Anion Gap 6 (L) 7 - 16 mmol/L    Glucose 95 74 - 99 mg/dL    BUN 21 6 - 23 mg/dL    CREATININE 0.5 (L) 0.7 - 1.2 mg/dL    GFR Non-African American >60 >=60 mL/min/1.73    GFR African American >60     Calcium 9.2 8.6 - 10.2 mg/dL    Total Protein 6.5 6.4 - 94 % --   05/17/21 0836 -- -- -- -- -- 92 % --   05/17/21 0340 -- -- -- -- -- -- 194 lb 11.2 oz (88.3 kg)   05/16/21 2130 (!) 117/90 97.8 °F (36.6 °C) Oral 136 18 95 % --       Oxygen Saturation Interpretation: Normal    ------------------------------------------ PROGRESS NOTES ------------------------------------------  Re-evaluation(s):  Time: 1430  Patients symptoms show no change  Repeat physical examination is not changed    Counseling:  I have spoken with the patient and discussed todays results, in addition to providing specific details for the plan of care and counseling regarding the diagnosis and prognosis. Their questions are answered at this time and they are agreeable with the plan of admission.    --------------------------------- ADDITIONAL PROVIDER NOTES ---------------------------------  Consultations:  Time: 1500 Spoke with ,. Discussed case. They will admit the patient. This patient's ED course included: a personal history and physicial examination, re-evaluation prior to disposition, continuous pulse oximetry and complex medical decision making and emergency management    This patient has remained hemodynamically stable during their ED course. Diagnosis:  1. Atrial fibrillation with RVR (HCC)        Disposition:  Patient's disposition: Admit to telemetry  Patient's condition is fair. Beto Shi MD  Resident  05/13/21 5426    ATTENDING PROVIDER ATTESTATION:     Rajesh Washington presented to the emergency department for evaluation of Atrial Fibrillation (new onset)    I have reviewed and discussed the case, including pertinent history (medical, surgical, family and social) and exam findings with the Resident and the Nurse assigned to Rajesh Washington. I have personally performed and/or participated in the history, exam, medical decision making, and procedures and agree with all pertinent clinical information.        I have reviewed my findings and recommendations with Jayme Moncada and members of family present at the time of disposition. I, Dr. Nemo Mariano am the primary physician of record for this patient. MDM: The patient is 80 y.o. male  with a past medical history of       Diagnosis Date    Acute on chronic diastolic congestive heart failure (HCC)     Anxiety     Asthma     [facility]     Chronic sinusitis     Closed displaced midcervical fracture of left femur ([facility] [facility] 5/03/1270    Complication of internal orthopedic device, initial encounter ([facility] [facility] 5/14/2018    Hardware not a problem, just in the way of fixation of broken hip, must be removed to insert hemiarthroplasty    Constipation, chronic     COPD     Degenerative joint disease of left knee 5/15/2018    Depression     Gastritis     hx of gastritis    Hearing loss     Hyperlipidemia     Hypertension     Hypothyroidism     Iron deficiency anemia     Left leg pain 7/15/2019    Prediabetes     Type 2 diabetes mellitus without complication ([facility]     Vitamin D deficiency      presenting to the emergency department with a chief complaint of shortness of breath. Differential diagnosis includes not limited to, A. fib RVR, electrolyte derangement, dehydration, congestive heart failure the patient did have labs and imaging which were reviewed. Patient CBC, CMP and troponin which were fairly unremarkable, proBNP mildly elevated at 1360, chest x-ray no acute process, ultrasound negative for any DVT. The patient be admitted for further treatment and evaluation. The patient was treated symptomatically. My findings/plan: The encounter diagnosis was Atrial fibrillation with RVR ([facility]   Current Discharge Medication List        Juany Mead, [address], DO  05/17/21 8301

## 2021-05-13 NOTE — PROGRESS NOTES
21  Karina Barahona : 1935 Sex: male  Age 80 y.o. Subjective:  Chief Complaint   Patient presents with    Other     pt states left sided fluid, right leg swelling         HPI:   Karina Barahona , 80 y.o. male presents to [facility] care for evaluation of swelling. The patient is having quite a bit of right leg swelling and has become increasingly short of breath. The patient was evaluated by his home health nurse who noted that he was having some swelling to his flank areas and recommended that he be evaluated. He was just seen for a possible cellulitis of the right lower extremity and COPD exacerbation. The patient was placed on steroids and an inhaler. The patient has been using the inhaler quite a bit. The patient does not have any history of CHF. The patient does have a history of aortic stenosis that they have discussed TAVR. ROS:   Unless otherwise stated in this report the patient's positive and negative responses for review of systems for constitutional, eyes, ENT, cardiovascular, respiratory, gastrointestinal, neurological, , musculoskeletal, and integument systems and related systems to the presenting problem are either stated in the history of present illness or were not pertinent or were negative for the symptoms and/or complaints related to the presenting medical problem. Positives and pertinent negatives as per HPI. All others reviewed and are negative.       PMH:     Past Medical History:   Diagnosis Date    Anxiety     Asthma     Bladder cancer ([address]     Chronic sinusitis     Closed displaced midcervical fracture of left femur ([address] 4007    Complication of internal orthopedic device, initial encounter ([address] 2018    Hardware not a problem, just in the way of fixation of broken hip, must be removed to insert hemiarthroplasty    Constipation, chronic     COPD     Degenerative joint disease of left knee 5/15/2018    Depression     Gastritis     hx of gastritis    Hearing loss     Hyperlipidemia     Hypertension     Hypothyroidism     Iron deficiency anemia     Left leg pain 7/15/2019    Prediabetes     Type 2 diabetes mellitus without complication (HCC)     Vitamin D deficiency        Past Surgical History:   Procedure Laterality Date    COLONOSCOPY      FEMUR FRACTURE SURGERY  04/12/2010    IM nailing left femoral shaft    FRACTURE SURGERY      ID PARTIAL HIP REPLACEMENT Left 5/14/2018    HIP HEMIARTHROPLASTY LEFT, WITH  REMOVAL OF PREVIOUS HARDWARE, INSERTION OF MEDICATION DELIVERY SYSTEM (WITH INTRAOPERATIVE FLUOROSCOPY) performed by Jyotsna Graves MD at [facility]      10/10       Family History   Problem Relation Age of Onset    Arthritis Mother     Cancer Mother         glaucoma    Heart Disease Father     Coronary Art Dis Father     Emphysema Father     Diabetes Other     Arthritis Son        Medications:     Current Outpatient Medications:     escitalopram (LEXAPRO) 10 MG tablet, Take 1 tablet by mouth daily, Disp: 90 tablet, Rfl: 5    doxycycline hyclate (VIBRA-TABS) 100 MG tablet, Take 1 tablet by mouth 2 times daily, Disp: 180 tablet, Rfl: 5    levothyroxine (SYNTHROID) 25 MCG tablet, Take 1 tablet by mouth daily, Disp: 90 tablet, Rfl: 5    predniSONE (DELTASONE) 2.5 MG tablet, Take 1 tablet by mouth daily, Disp: 90 tablet, Rfl: 5    albuterol sulfate HFA (VENTOLIN HFA) 108 (90 Base) MCG/ACT inhaler, Inhale 2 puffs into the lungs 4 times daily as needed for Wheezing, Disp: 1 Inhaler, Rfl: 5    fluticasone-salmeterol (ADVAIR DISKUS) 500-50 MCG/DOSE diskus inhaler, Inhale 1 puff into the lungs every 12 hours, Disp: 60 each, Rfl: 3    albuterol (PROVENTIL) (2.5 MG/3ML) 0.083% nebulizer solution, Take 3 mLs by nebulization every 6 hours as needed for Wheezing, Disp: 120 each, Rfl: 3    vitamin D (ERGOCALCIFEROL) 1.25 MG (17259 UT) CAPS capsule, One  Twice a week, Disp: 24 capsule, Rfl: 5   hydrocortisone 2.5 % cream, Apply topically 2 times daily. , Disp: 2 Tube, Rfl: 12    folic acid (FOLVITE) 1 MG tablet, Take 1 tablet by mouth daily, Disp: 30 tablet, Rfl: 3    PROAIR  (90 Base) MCG/ACT inhaler, , Disp: , Rfl:     aspirin EC 81 MG EC tablet, Take 1 tablet by mouth daily for 25 days, Disp: 25 tablet, Rfl: 0    Allergies: Allergies   Allergen Reactions    Cephalexin     Sulfa Antibiotics        Social History:     Social History     Tobacco Use    Smoking status: Former Smoker     Years: 20.00     Types: Cigars    Smokeless tobacco: Never Used    Tobacco comment: quit smoking 40 years ago   Substance Use Topics    Alcohol use: Not on file    Drug use: No       Patient lives at home. Physical Exam:     Vitals:    05/13/21 1146   BP: (!) 140/72   Pulse: 120   Resp: 20   Temp: 97.3 °F (36.3 °C)   SpO2: (!) 87%   Weight: 201 lb (91.2 kg)   Height: 6' 3\" (1.905 m)       Exam:  Physical Exam  Nurses note and vital signs reviewed and patient is not hypoxic. General: The patient appears well and in no apparent distress. Patient is resting comfortably on cart. Skin: Warm, dry, no pallor noted. There is no rash noted. Head: Normocephalic, atraumatic. Eye: Normal conjunctiva  Ears, Nose, Mouth, and Throat: Oral mucosa is moist  Cardiovascular: Irregular tachycardia  Respiratory: Patient is in no distress, diminished lung sounds bilaterally  Back: Non-tender, no CVA tenderness bilaterally to percussion. There is evidence of pockets of fluid noted to the bilateral posterior flank area  GI: Normal bowel sounds, no tenderness to palpation, no masses appreciated. No rebound, guarding, or rigidity noted.   Musculoskeletal: 2-3+ pitting edema to the right lower extremity, 1+ edema noted to the left lower extremity, there is no significant calf tenderness  Neurological: A&O x4, normal speech        Testing:     EKG: Atrial fibrillation with rapid ventricular response, rate of 126, nonspecific ST-T wave changes      Medical Decision Making:     The patient was noted to be hypoxic on arrival.  We noted that his heart rate was somewhat irregular. EKG was obtained. EKG showed atrial fibrillation with rapid ventricular response. He does not have a history of this. The patient was placed on O2. The patient had EMS called. We updated the ER of the patient's arrival.    The patient will be transported by EMS for further evaluation and assessment. Clinical Impression:   Avelino Garduno was seen today for other. Diagnoses and all orders for this visit:    Atrial fibrillation with RVR ([address]    Tachycardia  -     EKG 12 lead;  Future  -     EKG 12 lead    Right leg swelling    Hypoxia    Localized swelling of back    Aortic valve stenosis, etiology of cardiac valve disease unspecified        Directly to the ED with EMS    SIGNATURE: Dina Rowland III, PA-C

## 2021-05-14 NOTE — PROGRESS NOTES
[facility] Progress Note    Admitting Date and Time: 5/13/2021 12:41 PM  Admit Dx: Atrial fibrillation with RVR ([facility] [I48.91]    Subjective:  Patient is being followed for Atrial fibrillation with RVR ([facility] [I48.91]   Pt feels fine, he doesn't have a clue why he is at the hospital.     ROS: denies fever, chills, cp, sob, n/v, HA unless stated above.       aspirin EC  81 mg Oral Daily    folic acid  1 mg Oral Daily    levothyroxine  25 mcg Oral Daily    predniSONE  2.5 mg Oral Daily    sodium chloride flush  5-40 mL Intravenous 2 times per day    digoxin  250 mcg Intravenous Q6H    Arformoterol Tartrate  15 mcg Nebulization BID    And    budesonide  0.5 mg Nebulization BID     sodium chloride flush, 5-40 mL, PRN  sodium chloride, 25 mL, PRN  promethazine, 12.5 mg, Q6H PRN    Or  ondansetron, 4 mg, Q6H PRN  polyethylene glycol, 17 g, Daily PRN  acetaminophen, 650 mg, Q6H PRN    Or  acetaminophen, 650 mg, Q6H PRN  perflutren lipid microspheres, 1.5 mL, ONCE PRN         Objective:    BP (!) 98/0   Pulse 104   Temp 97.6 °F (36.4 °C) (Oral)   Resp 18   Ht 6' 3\" (1.905 m)   Wt 204 lb 9.4 oz (92.8 kg)   SpO2 93%   BMI 25.57 kg/m²     General Appearance: alert and oriented x1-2  Skin: warm and dry  Head: normocephalic and atraumatic  Eyes: pupils equal, round, and reactive to light, extraocular eye movements intact, conjunctivae normal  Neck: neck supple and non tender without mass   Pulmonary/Chest: clear to auscultation bilaterally- no wheezes, rales or rhonchi, normal air movement, no respiratory distress  Cardiovascular: IRRR, normal S1 and S2 and no carotid bruits  Abdomen: soft, non-tender, non-distended, normal bowel sounds, no masses or organomegaly  Extremities: no cyanosis, no clubbing and +2 edema  Neurologic: no cranial nerve deficit and speech normal        Recent Labs     05/13/21  1324 05/14/21  0322    139   K 4.3 4.3    107   CO2 29 26   BUN 25* 21   CREATININE 0.7 0.5*   GLUCOSE 122* 95   CALCIUM 9.6 9.2       Recent Labs     05/13/21  1324   WBC 5.2   RBC 3.80   HGB 13.5   HCT 42.1   .8*   MCH 35.5*   MCHC 32.1   RDW 15.2*      MPV 10.5         Assessment:    Active Problems:    Atrial fibrillation with RVR (HCC)  Resolved Problems:    * No resolved hospital problems. *      Plan:  1. Afib with RVR, not sure about the trigger, unable to be on cradizem drip due to hypotensive, patient was started on digoxin, will obtain an echo, consulted cardiology, patient will need [facility], will discuss with cardiology  2. Acute decompensation of diastolic heart failure, with bilateral lower ext edema, patient needs to be on lasix, but hindered by hypotension  3. Hypothyroidism, continue synthroid. 4.  Hx of COPD, not in exacerbation, continue bronchodilator       NOTE: This report was transcribed using voice recognition software. Every effort was made to ensure accuracy; however, inadvertent computerized transcription errors may be present.   Electronically signed by Suzie Aguilar MD on 5/14/2021 at 9:02 AM

## 2021-05-14 NOTE — CONSULTS
I independently performed an evaluation on the patient. I have reviewed the above documentation completed by the advance practitioner. Please see my additional contributions to the HPI, physical exam, assessment and medical decision making. Physical Exam   BP (!) 98/0   Pulse 104   Temp 97.6 °F (36.4 °C) (Oral)   Resp 18   Ht 6' 3\" (1.905 m)   Wt 204 lb 9.4 oz (92.8 kg)   SpO2 93%   BMI 25.57 kg/m²   Constitutional: Oriented to person, place, and time. Well-developed and well-nourished. No distress. Head: Normocephalic and atraumatic. Eyes: EOM are normal. Pupils are equal, round, and reactive to light. Neck: Normal range of motion. Neck supple. No hepatojugular reflux and no JVD present. Carotid bruit is not present. No tracheal deviation present. No thyromegaly present. Cardiovascular: Normal rate, regular rhythm, normal heart sounds and intact distal pulses. Exam reveals no gallop and no friction rub. No murmur heard. Pulmonary/Chest: Effort normal and breath sounds normal. No respiratory distress. No wheezes. No rales. No tenderness. Abdominal: Soft. Bowel sounds are normal. No distension and no mass. No tenderness. No rebound and no guarding. Musculoskeletal: Normal range of motion. No edema and no tenderness. Lymphadenopathy:   No cervical adenopathy. Neurological: Alert and oriented to person, place, and time. Skin: Skin is warm and dry. No rash noted. Not diaphoretic. No erythema. Psychiatric: Normal mood and affect. Behavior is normal.     See accompanying documentation for full consult. ASSESSMENT AND PLAN:  1. Acute diastolic CHF[de-identified]    LE US no DVT. See below regarding afib and AS. Low albumin. Pre-renal azotemia. Watch volume. Nas hose. 2. New onset Afib: Rate control carefully. Lovenox for now. Continue loading dig. Low dose toprol and dig po as maintenance. Will discuss NOAC and possibility of DCCV with patient. 3.  VHD: Hx of severe

## 2021-05-14 NOTE — CONSULTS
Inpatient Cardiology Consultation      Reason for Consult: Congestive heart failure, atrial fibrillation    Consulting Physician:  Dr. Yaquelin Farah    Requesting Physician:     Date of Consultation: 5/14/2021    HISTORY OF PRESENT ILLNESS:     This 42-year-old male is known to [facility] cardiology by Dr. León Harrison. He was last evaluated as an outpatient on December 1, 2020 for severe aortic stenosis. The patient was not a surgical candidate and refused a TAVR. He presented to the emergency room from [facility] for an irregular heartbeat. He was having swelling and shortness of breath and was in new onset atrial fibrillation with a heart rate of 146. The patient is unable to tell me why he went to the [facility] facility. He denies any chest pain or dyspnea or palpitations. He does admit to some swelling of the lower extremities but cannot tell me when it started. He had no cough or fever    Blood pressure on admission was 107/72 and later dropped to 76/53. He became tachypneic and his O2 saturation was 100% on room air. Potassium was 4.3 with a BUN of 25 and a creatinine of 0.7 and proBNP of 1360, troponin 0.02, CBC unremarkable. Chest x-ray showed no acute process. EKG showed atrial fibrillation with rapid ventricular response and bifascicular block and nonspecific T wave changes    He was treated with a fluid bolus , digoxin 0.25 IV. He remains in atrial fibrillation with borderline rapid ventricular response. Loading dose of digoxin has been ordered as well as a 2D echo      Past Medical History:      1. Quit smoking in 1980/COPD and chronic steroid usage  2. Hypertension which he denies  3. Hyperlipidemia which he denies  4. Hypothyroidism  5. Bladder cancer  6. Diabetes which he denies  7. Limited echo November 24, 2020, normal left ventricular systolic function with severe aortic stenosis mean gradient 56 mmHg  8. Hyponatremia  9. Bladder cancer  10.  Iron deficiency anemia  11. Gastritis  12. Depression and anxiety  13. Chronic constipation  14. Left femur fracture May 2018 status post  nailing in 2010 and partial hip replacement in May 2018  15. Colonoscopy and endoscopy      Medications Prior to admit:  Prior to Admission medications    Medication Sig Start Date End Date Taking? Authorizing Provider   escitalopram (LEXAPRO) 10 MG tablet Take 1 tablet by mouth daily 5/4/21   Abhijeet CHRISSY Wadsworth, DO   doxycycline hyclate (VIBRA-TABS) 100 MG tablet Take 1 tablet by mouth 2 times daily 5/4/21   [facility] CHRISSY Wadsworth, DO   levothyroxine (SYNTHROID) 25 MCG tablet Take 1 tablet by mouth daily 5/4/21   Abhijeet L Ermelinda, DO   predniSONE (DELTASONE) 2.5 MG tablet Take 1 tablet by mouth daily 5/4/21   [facility] CHRISSY Wadsworth, DO   albuterol sulfate HFA (VENTOLIN HFA) 108 (90 Base) MCG/ACT inhaler Inhale 2 puffs into the lungs 4 times daily as needed for Wheezing 5/4/21   [facility] CHRISSY Wadsworth, DO   fluticasone-salmeterol (ADVAIR DISKUS) 500-50 MCG/DOSE diskus inhaler Inhale 1 puff into the lungs every 12 hours 5/4/21   Abhijeet L Ermelinda, DO   albuterol (PROVENTIL) (2.5 MG/3ML) 0.083% nebulizer solution Take 3 mLs by nebulization every 6 hours as needed for Wheezing 5/4/21   Abhijeet L Ermelinda, DO   vitamin D (ERGOCALCIFEROL) 1.25 MG (27179 UT) CAPS capsule One  Twice a week 12/30/20   Abhijeet Wadsworth, DO   aspirin EC 81 MG EC tablet Take 1 tablet by mouth daily for 25 days 4/24/20 12/1/20  Mireya Mai DO   hydrocortisone 2.5 % cream Apply topically 2 times daily.  4/24/20   Mireya Mai DO   folic acid (FOLVITE) 1 MG tablet Take 1 tablet by mouth daily 4/25/20   Mireya Mai,    PROAIR  (07 Base) MCG/ACT inhaler  4/9/19   Historical Provider, MD       Current Medications:    Current Facility-Administered Medications: aspirin EC tablet 81 mg, 81 mg, Oral, Daily  folic acid (FOLVITE) tablet 1 mg, 1 mg, Oral, Daily  levothyroxine (SYNTHROID) tablet 25 mcg, 25 mcg, Oral, hives or pruritis   · Neurological: Denies dizziness, headaches or seizures. No numbness or tingling  · Psychiatric: Denies anxiety or depression. · Endocrine: Denies temperature intolerance. No recent weight change. .  · Hematologic/Lymphatic: Denies abnormal bruising or bleeding. No swollen lymph nodes    PHYSICAL EXAM:   BP (!) 120/0 Comment: doppler  Pulse 122   Temp 97.8 °F (36.6 °C) (Oral)   Resp 20   Ht 6' 3\" (1.905 m)   Wt 204 lb 9.4 oz (92.8 kg)   SpO2 97%   BMI 25.57 kg/m²   CONST:  Well developed, well nourished who appears of stated age. Awake, alert and cooperative. No apparent distress. HEENT:   Head- Normocephalic, atraumatic   Eyes- Conjunctivae pink, anicteric  Throat- Oral mucosa pink and moist  Neck-  No stridor, trachea midline, no jugular venous distention. No carotid bruit. CHEST: Chest symmetrical and non-tender to palpation. No accessory muscle use or intercostal retractions  RESPIRATORY: Lung sounds - clear throughout fields   CARDIOVASCULAR:     Heart Inspection- shows no noted pulsations  Heart Palpation- no heaves or thrills; PMI is non-displaced   Heart Ausculation- irregularly irregular rate and rhythm,  2/6 systolic murmur. No s3, s4 or rub   PV: Positive lower extremity edema on the right, no edema on the left lower extremity. No varicosities. Pedal pulses palpable, no clubbing or cyanosis   ABDOMEN: Soft, non-tender to light palpation. Bowel sounds present. No palpable masses no organomegaly; no abdominal bruit  MS: Good muscle strength and tone. No atrophy or abnormal movements. : Deferred  SKIN: Warm and dry no statis dermatitis or ulcers   NEURO / PSYCH: Oriented to person, place and time but disoriented to situation and cannot tell me who the president is. Speech clear and appropriate. Follows all commands.  Pleasant affect     DATA:    ECG / Tele strips:     atrial fibrillation with rapid ventricular response Diagnostic:    No intake or output data in the 24 hours ending 05/14/21 0740    Labs:   CBC:   Recent Labs     05/13/21  1324   WBC 5.2   HGB 13.5   HCT 42.1        BMP:   Recent Labs     05/13/21  1324 05/14/21  0322    139   K 4.3 4.3   CO2 29 26   BUN 25* 21   CREATININE 0.7 0.5*   LABGLOM >60 >60   CALCIUM 9.6 9.2     Mag: No results for input(s): MG in the last 72 hours. Phos: No results for input(s): PHOS in the last 72 hours. TFT:   Lab Results   Component Value Date    TSH 1.850 05/13/2021    L7TDOGU 10.2 05/20/2020      HgA1c:   Lab Results   Component Value Date    LABA1C 5.0 01/20/2020     No results found for: EAG  proBNP:   Recent Labs     05/13/21  1324 05/14/21  0322   PROBNP 1,360* 1,256*     PT/INR:   Recent Labs     05/13/21  1324 05/14/21  0458   PROTIME 14.9* 14.9*   INR 1.3 1.3     APTT:  Recent Labs     05/13/21  1324   APTT 34.5     CARDIAC ENZYMES:  Recent Labs     05/13/21  1324   TROPONINI 0.02     FASTING LIPID PANEL:  Lab Results   Component Value Date    CHOL 171 12/23/2020    HDL 63 12/23/2020    LDLCALC 100 12/23/2020    TRIG 39 12/23/2020     LIVER PROFILE:  Recent Labs     05/13/21  1324 05/14/21  0322   AST 40* 39   ALT 19 16   LABALBU 3.6 2.9*       Electronically signed by NAE Crowe CNP on 5/14/2021 at 7:40 AM     Assessment and plan by Dr. Bill Us    I independently performed an evaluation on the patient. I have reviewed the above documentation completed by the advance practitioner. Please see my additional contributions to the HPI, physical exam, assessment and medical decision making.     Physical Exam   BP (!) 98/0   Pulse 104   Temp 97.6 °F (36.4 °C) (Oral)   Resp 18   Ht 6' 3\" (1.905 m)   Wt 204 lb 9.4 oz (92.8 kg)   SpO2 93%   BMI 25.57 kg/m²   Constitutional: Oriented to person, place, and time. Well-developed and well-nourished. No distress. Head: Normocephalic and atraumatic. Eyes: EOM are normal. Pupils are equal, round, and reactive to light. Neck: Normal range of motion.  Neck

## 2021-05-14 NOTE — PROGRESS NOTES
Spoke with patient wife Johanne Macdonald, she states that patient has some confusion and short term memory loss at baseline.

## 2021-05-14 NOTE — CARE COORDINATION
Social Work/Discharge Planning:  Met with patient and completed initial assessment. Explained Social Work role and discussed transition of care/discharge planning. Patient lives with his wife in a one story house with four steps to enter. PTA patient uses a wheeled walker. Patient unable to answer all the assessment questions. Called patient wife Wesley Hernandez (ph: [phone]) and confirmed plan for patient to return home at discharge. Patient has caregiver through [employer] for two hours a day five days a week provided by [facility]. He has a bedside commode, extended tub bench and a transfer wheelchair. Patient has a snf history with [facility]. Patient PCP is Dr. Wen Sorensen and pharmacy is [facility]. Wesley Hernandez prefers to use [facility] if patient is requiring hhc at discharge. Wesley Hernandez states her  will need transport home at discharge. Will continue to follow and assist with discharge planning.   Electronically signed by ENEIDA Arredondo on 5/14/2021 at 3:35 PM

## 2021-05-15 NOTE — PROGRESS NOTES
[facility] Progress Note    Admitting Date and Time: 5/13/2021 12:41 PM  Admit Dx: Atrial fibrillation with RVR ([facility] [address] [I48.91]    Subjective:  Patient is being followed for Atrial fibrillation with RVR ([facility] [address] [I48.91]   Pt feels fine, he doesn't have a clue why he is at the hospital.  Still in afib with RVR, patient wants to go home, wife is not sure could take care of him    ROS: denies fever, chills, cp, sob, n/v, HA unless stated above.       atorvastatin  40 mg Oral Nightly    metoprolol succinate  12.5 mg Oral Daily    digoxin  125 mcg Oral Daily    miconazole nitrate   Topical BID    aspirin EC  81 mg Oral Daily    folic acid  1 mg Oral Daily    levothyroxine  25 mcg Oral Daily    predniSONE  2.5 mg Oral Daily    sodium chloride flush  5-40 mL Intravenous 2 times per day    Arformoterol Tartrate  15 mcg Nebulization BID    And    budesonide  0.5 mg Nebulization BID     sodium chloride flush, 5-40 mL, PRN  sodium chloride, 25 mL, PRN  promethazine, 12.5 mg, Q6H PRN   Or  ondansetron, 4 mg, Q6H PRN  polyethylene glycol, 17 g, Daily PRN  acetaminophen, 650 mg, Q6H PRN   Or  acetaminophen, 650 mg, Q6H PRN  perflutren lipid microspheres, 1.5 mL, ONCE PRN         Objective:    /86   Pulse 104   Temp 97.3 °F (36.3 °C) (Oral)   Resp 18   Ht 6' 3\" (1.905 m)   Wt 206 lb 6.4 oz (93.6 kg)   SpO2 96%   BMI 25.80 kg/m²     General Appearance: alert and oriented x1-2  Skin: warm and dry  Head: normocephalic and atraumatic  Eyes: pupils equal, round, and reactive to light, extraocular eye movements intact, conjunctivae normal  Neck: neck supple and non tender without mass   Pulmonary/Chest: clear to auscultation bilaterally- no wheezes, rales or rhonchi, normal air movement, no respiratory distress  Cardiovascular: IRRR, normal S1 and S2 and no carotid bruits  Abdomen: soft, non-tender, non-distended, normal bowel sounds, no masses or organomegaly  Extremities: no cyanosis, no clubbing and +2 edema  Neurologic: no cranial nerve deficit and speech normal        Recent Labs     05/13/21  1324 05/14/21  0322    139   K 4.3 4.3    107   CO2 29 26   BUN 25* 21   CREATININE 0.7 0.5*   GLUCOSE 122* 95   CALCIUM 9.6 9.2       Recent Labs     05/13/21  1324   WBC 5.2   RBC 3.80   HGB 13.5   HCT 42.1   .8*   MCH 35.5*   MCHC 32.1   RDW 15.2*      MPV 10.5         Assessment:    Active Problems: Aortic valve stenosis    Atrial fibrillation with RVR ([facility])    Acute on chronic diastolic congestive heart failure (HCC)  Resolved Problems:    * No resolved hospital problems. *      Plan:  1. Afib with RVR, not sure about the trigger, unable to be on cradizem drip due to hypotensive, patient was started on digoxin, curently at 125 mcg daily, and metoprolol 25 mg bid , consulted cardiology, started lovenox for [facility], will discuss with cardiology NOAC, still not controlled  2. Acute decompensation of diastolic heart failure, with bilateral lower ext edema, patient needs to be on lasix, but hindered by hypotension  3. Hypothyroidism, continue synthroid. 4.  Hx of COPD, not in exacerbation, continue bronchodilator   5. Dispo, obtain pt ot      NOTE: This report was transcribed using voice recognition software. Every effort was made to ensure accuracy; however, inadvertent computerized transcription errors may be present.   Electronically signed by Angelique Hood MD on 5/15/2021 at 8:57 AM

## 2021-05-15 NOTE — PROGRESS NOTES
Nightly Elsy Pantelis, APRN - CNP   40 mg at 05/14/21 2100    metoprolol succinate (TOPROL XL) extended release tablet 12.5 mg  12.5 mg Oral Daily Elsy Pantelis, APRN - CNP   12.5 mg at 05/14/21 1020    digoxin (LANOXIN) tablet 125 mcg  125 mcg Oral Daily Elsy Pantelis, APRN - CNP        miconazole nitrate 2 % ointment   Topical BID Lise Gutierrez MD   Given at 05/14/21 2100    aspirin EC tablet 81 mg  81 mg Oral Daily Alyssa Hooper MD   81 mg at 27/27/65 2840    folic acid (FOLVITE) tablet 1 mg  1 mg Oral Daily Alyssa Hooper MD   1 mg at 05/14/21 0813    levothyroxine (SYNTHROID) tablet 25 mcg  25 mcg Oral Daily Alyssa Hooper MD   25 mcg at 05/15/21 0551    predniSONE (DELTASONE) tablet 2.5 mg  2.5 mg Oral Daily Alyssa Hooper MD   2.5 mg at 05/14/21 0904    sodium chloride flush 0.9 % injection 5-40 mL  5-40 mL Intravenous 2 times per day Alyssa Hooper MD   10 mL at 05/14/21 2100    sodium chloride flush 0.9 % injection 5-40 mL  5-40 mL Intravenous PRN Alyssa Hooper MD        0.9 % sodium chloride infusion  25 mL Intravenous PRN Alyssa Hooper MD        promethazine (PHENERGAN) tablet 12.5 mg  12.5 mg Oral Q6H PRN Alyssa Hooper MD        Or    ondansetron (ZOFRAN) injection 4 mg  4 mg Intravenous Q6H PRN Alyssa Hooper MD        polyethylene glycol (GLYCOLAX) packet 17 g  17 g Oral Daily PRN Alyssa Hooper MD        acetaminophen (TYLENOL) tablet 650 mg  650 mg Oral Q6H PRN Alyssa Hooper MD        Or    acetaminophen (TYLENOL) suppository 650 mg  650 mg Rectal Q6H PRN Alyssa Hooper MD        perflutren lipid microspheres (DEFINITY) injection 1.65 mg  1.5 mL Intravenous ONCE PRN Alyssa Hooper MD        Arformoterol Tartrate (BROVANA) nebulizer solution 15 mcg  15 mcg Nebulization BID Alyssa Hooper MD   15 mcg at 05/15/21 0802    And    budesonide (PULMICORT) nebulizer suspension 500 mcg  0.5 mg Nebulization BID Sophia Ram MD   500 mcg at 05/15/21 0802      sodium chloride         Physical Exam:  /86   Pulse 104   Temp 97.3 °F (36.3 °C) (Oral)   Resp 18   Ht 6' 3\" (1.905 m)   Wt 206 lb 6.4 oz (93.6 kg)   SpO2 96%   BMI 25.80 kg/m²   Wt Readings from Last 3 Encounters:   05/15/21 206 lb 6.4 oz (93.6 kg)   05/13/21 201 lb (91.2 kg)   05/04/21 201 lb (91.2 kg)     Appearance: Awake, alert, no acute respiratory distress, has severe hearing loss and appears little confused  Skin: Intact, no rash  Head: Normocephalic, atraumatic  Eyes: EOMI, no conjunctival erythema  ENMT: No pharyngeal erythema, MMM, no rhinorrhea  Neck: Supple, no elevated JVP, no carotid bruits  Lungs: Clear to auscultation bilaterally. No wheezes, rales, or rhonchi.   Cardiac: Irregularly irregular rate and rhythm, tachycardic +S1S2, no murmurs apparent  Abdomen: Soft, nontender, +bowel sounds  Extremities: Moves all extremities x 4, 1+ lower extremity edema  Neurologic: No focal motor deficits apparent, normal mood and affect  Peripheral Pulses: Intact posterior tibial pulses bilaterally    Intake/Output:    Intake/Output Summary (Last 24 hours) at 5/15/2021 0911  Last data filed at 5/15/2021 0842  Gross per 24 hour   Intake 600 ml   Output 700 ml   Net -100 ml     I/O this shift:  In: -   Out: 200 [Urine:200]    Laboratory Tests:  Recent Labs     05/13/21  1324 05/14/21  0322    139   K 4.3 4.3    107   CO2 29 26   BUN 25* 21   CREATININE 0.7 0.5*   GLUCOSE 122* 95   CALCIUM 9.6 9.2     No results found for: MG  Recent Labs     05/13/21  1324 05/14/21  0322   ALKPHOS 51 43   ALT 19 16   AST 40* 39   PROT 7.5 6.5   BILITOT 1.3* 1.1   LABALBU 3.6 2.9*     Recent Labs     05/13/21  1324   WBC 5.2   RBC 3.80   HGB 13.5   HCT 42.1   .8*   MCH 35.5*   MCHC 32.1   RDW 15.2*      MPV 10.5     Lab Results   Component Value Date    TROPONINI 0.02 05/13/2021    TROPONINI <0.01 04/22/2020     Lab Results   Component Value cm².  · Severe COPD  · Hyperlipidemia  · Hypertension well controlled  · Severe hearing loss  · Peripheral edema multifactorial including HFpEF and hypoalbuminemia. Plan:   · Hemodynamically stable but heart rate rate is not well controlled. Continue digoxin 125 mcg daily, increase metoprolol to 25 mg twice daily for better rate control  · Continue aspirin 81 mg p.o. daily. Patient not on anticoagulation therapy and would recommend starting Eliquis 5 mg twice daily if he is agreeable. Considering severe aortic stenosis, I would not recommend cardioversion on him at this time. · Management of COPD as per primary service. · Continue rest of the current medications  · Poor prognosis without aortic valve surgery either by SAVR or TAVR. He may be a  candidate for palliative care consult. Cherylene Clos, MD., Allison Blood.   [facility]) Cardiology

## 2021-05-16 NOTE — PLAN OF CARE
Problem: Falls - Risk of:  Goal: Will remain free from falls  Description: Will remain free from falls  5/16/2021 0125 by Ravi Denny RN  Outcome: Met This Shift  5/15/2021 1807 by Michael Ibarra RN  Outcome: Met This Shift  Goal: Absence of physical injury  Description: Absence of physical injury  5/16/2021 0125 by Ravi Denny RN  Outcome: Met This Shift  5/15/2021 1807 by Michael Ibarra RN  Outcome: Met This Shift     Problem: Pain:  Goal: Pain level will decrease  Description: Pain level will decrease  5/16/2021 0125 by Ravi Denny RN  Outcome: Met This Shift  5/15/2021 1807 by Michael Ibarra RN  Outcome: Met This Shift  Goal: Control of acute pain  Description: Control of acute pain  5/16/2021 0125 by Ravi Denny RN  Outcome: Met This Shift  5/15/2021 1807 by Michael Ibarra RN  Outcome: Met This Shift  Goal: Control of chronic pain  Description: Control of chronic pain  5/16/2021 0125 by Ravi Denny RN  Outcome: Met This Shift  5/15/2021 1807 by Michael Ibarra RN  Outcome: Met This Shift     Problem: FLUID AND ELECTROLYTE IMBALANCE  Goal: Fluid and electrolyte balance are achieved/maintained  Outcome: Met This Shift     Problem: HH FLUID RETENTION-CHF  Goal: Absence of fluid overload signs and symptoms  Outcome: Met This Shift     Problem: Cardiac Output - Decreased:  Goal: Cardiac rhythm stable  Outcome: Met This Shift

## 2021-05-16 NOTE — CARE COORDINATION
CASE MANAGEMENT. .. Spoke with patients wife over the phone [phone]. She is interested in patient going to [facility] [place]. Mrs Johana Akhtar states that patient must be able to ambulate with his walker by himself before returning home. States that she has limited physical capabilities and is unable to assist him. [facility] and [facility]. She is requesting [facility]. Referral will have to be made by cm/guillermo tomorrow d/t it being the weekend. PT 17. OT pending. Notified them to see patient in the am. Will cont to follow along and assist with needs accordingly.

## 2021-05-16 NOTE — PROGRESS NOTES
INPATIENT CARDIOLOGY FOLLOW-UP    Name: Reece Mendoza    Age: 80 y.o. Date of Admission: 5/13/2021 12:41 PM    Date of Service: 5/16/2021    Chief Complaint: Follow-up for Congestive heart failure, atrial fibrillation. Interim History:  No new overnight cardiac complaints. Currently with no complaints of CP, SOB, palpitations, dizziness, or lightheadedness. He told me that he wants to go home. AF with RVR on telemetry. Review of Systems:   Cardiac: As per HPI  General: No fever, chills  Pulmonary: As per HPI  HEENT: No visual disturbances, difficult swallowing  GI: No nausea, vomiting  Endocrine: No thyroid disease or DM  Musculoskeletal: LINK x 4, no focal motor deficits  Skin: Intact, no rashes  Neuro/Psych: No headache or seizures    Problem List:  Patient Active Problem List   Diagnosis    Acquired varus deformity knee    COPD without exacerbation ([address]    Mixed hyperlipidemia    Chronic osteomyelitis of left femur ([address]    Degenerative joint disease of left knee    Acquired hypothyroidism    Primary osteoarthritis involving multiple joints    Anxiety    Vitamin D deficiency    Pulmonary emphysema ([address]    H/O calcium pyrophosphate deposition disease (CPPD)    Unexplained weight loss    Mild intermittent asthma without complication    Elevated liver function tests    Hyponatremia    Unable to walk    Generalized weakness    Low vitamin B12 level    Recurrent falls    Essential hypertension    Aortic valve stenosis    Atrial fibrillation with RVR ([address]    Acute on chronic diastolic congestive heart failure (HCC)       Allergies:   Allergies   Allergen Reactions    Cephalexin     Sulfa Antibiotics        Current Medications:  Current Facility-Administered Medications   Medication Dose Route Frequency Provider Last Rate Last Admin    metoprolol succinate (TOPROL XL) extended release tablet 50 mg  50 mg Oral BID Charles Campbell MD        enoxaparin (LOVENOX) injection 90 mg 1 mg/kg Subcutaneous BID Bob Sheppard MD   90 mg at 05/15/21 2136    atorvastatin (LIPITOR) tablet 40 mg  40 mg Oral Nightly Elsy Pantelis, APRN - CNP   40 mg at 05/15/21 2136    digoxin (LANOXIN) tablet 125 mcg  125 mcg Oral Daily Elsy Pantelis, APRN - CNP   125 mcg at 05/15/21 1016    miconazole nitrate 2 % ointment   Topical BID Bob Sheppard MD   Given at 05/15/21 2136    aspirin EC tablet 81 mg  81 mg Oral Daily Ahsan Solorzano MD   81 mg at 25/14/30 2150    folic acid (FOLVITE) tablet 1 mg  1 mg Oral Daily Ahsan Solorzano MD   1 mg at 05/15/21 1016    levothyroxine (SYNTHROID) tablet 25 mcg  25 mcg Oral Daily Ahsan Solorzano MD   25 mcg at 05/16/21 7701    predniSONE (DELTASONE) tablet 2.5 mg  2.5 mg Oral Daily Ahsan Solorzano MD   2.5 mg at 05/15/21 1016    sodium chloride flush 0.9 % injection 5-40 mL  5-40 mL Intravenous 2 times per day Ahsan Solorzano MD   10 mL at 05/15/21 2136    sodium chloride flush 0.9 % injection 5-40 mL  5-40 mL Intravenous PRN Ahsan Solorzano MD        0.9 % sodium chloride infusion  25 mL Intravenous PRN Ahsan Solorzano MD        promethazine (PHENERGAN) tablet 12.5 mg  12.5 mg Oral Q6H PRN Ahsan Solorzano MD        Or    ondansetron (ZOFRAN) injection 4 mg  4 mg Intravenous Q6H PRN Ahsan Solorzano MD        polyethylene glycol (GLYCOLAX) packet 17 g  17 g Oral Daily PRN Ahsan Solorzano MD        acetaminophen (TYLENOL) tablet 650 mg  650 mg Oral Q6H PRN Ahsan Solorzano MD        Or    acetaminophen (TYLENOL) suppository 650 mg  650 mg Rectal Q6H PRN Ahsan Solorzano MD        perflutren lipid microspheres (DEFINITY) injection 1.65 mg  1.5 mL Intravenous ONCE PRN Ahsan Solorzano MD        Arformoterol Tartrate (BROVANA) nebulizer solution 15 mcg  15 mcg Nebulization BID Ahsan Solorzano MD   15 mcg at 05/15/21 2009    And    budesonide (PULMICORT) nebulizer suspension 500 mcg  0.5 mg Nebulization BID Ahsan Solorzano MD   500 mcg at 05/15/21 2009      sodium chloride         Physical Exam:  /88   Pulse 127   Temp 97.6 °F (36.4 °C) (Oral)   Resp 16   Ht 6' 3\" (1.905 m)   Wt 204 lb 8 oz (92.8 kg)   SpO2 95%   BMI 25.56 kg/m²   Wt Readings from Last 3 Encounters:   05/16/21 204 lb 8 oz (92.8 kg)   05/13/21 201 lb (91.2 kg)   05/04/21 201 lb (91.2 kg)     Appearance: Awake, alert, no acute respiratory distress, has severe hearing loss and appears little confused  Skin: Intact, no rash  Head: Normocephalic, atraumatic  Eyes: EOMI, no conjunctival erythema  ENMT: No pharyngeal erythema, MMM, no rhinorrhea  Neck: Supple, no elevated JVP, no carotid bruits  Lungs: Clear to auscultation bilaterally. No wheezes, rales, or rhonchi. Cardiac: Irregularly irregular rate and rhythm, tachycardic +S1S2, no murmurs apparent  Abdomen: Soft, nontender, +bowel sounds  Extremities: Moves all extremities x 4, 1+ lower extremity edema  Neurologic: No focal motor deficits apparent, normal mood and affect  Peripheral Pulses: Intact posterior tibial pulses bilaterally    Intake/Output:    Intake/Output Summary (Last 24 hours) at 5/16/2021 0852  Last data filed at 5/15/2021 1746  Gross per 24 hour   Intake 240 ml   Output --   Net 240 ml     No intake/output data recorded.     Laboratory Tests:  Recent Labs     05/13/21  1324 05/14/21  0322    139   K 4.3 4.3    107   CO2 29 26   BUN 25* 21   CREATININE 0.7 0.5*   GLUCOSE 122* 95   CALCIUM 9.6 9.2     No results found for: MG  Recent Labs     05/13/21  1324 05/14/21  0322   ALKPHOS 51 43   ALT 19 16   AST 40* 39   PROT 7.5 6.5   BILITOT 1.3* 1.1   LABALBU 3.6 2.9*     Recent Labs     05/13/21  1324   WBC 5.2   RBC 3.80   HGB 13.5   HCT 42.1   .8*   MCH 35.5*   MCHC 32.1   RDW 15.2*      MPV 10.5     Lab Results   Component Value Date    TROPONINI 0.02 05/13/2021    TROPONINI <0.01 04/22/2020     Lab Results   Component Value Date    INR 1.3 05/14/2021    INR 1.3 05/13/2021    INR 1.3 05/14/2018    PROTIME 14.9 (H) 05/14/2021    PROTIME 14.9 (H) 05/13/2021    PROTIME 14.7 (H) 05/14/2018     Lab Results   Component Value Date    TSH 1.850 05/13/2021     Lab Results   Component Value Date    LABA1C 5.0 01/20/2020     No results found for: EAG  Lab Results   Component Value Date    CHOL 171 12/23/2020    CHOL 170 05/20/2020    CHOL 130 01/20/2020     Lab Results   Component Value Date    TRIG 39 12/23/2020    TRIG 48 05/20/2020    TRIG 81 01/20/2020     Lab Results   Component Value Date    HDL 63 12/23/2020    HDL 56 05/20/2020    HDL 30 01/20/2020     Lab Results   Component Value Date    LDLCALC 100 (H) 12/23/2020    LDLCALC 104 05/20/2020    LDLCALC 84 01/20/2020     Lab Results   Component Value Date    LABVLDL 8 12/23/2020    LABVLDL 16 01/20/2020     Lab Results   Component Value Date    CHOLHDLRATIO 3 05/20/2020       Cardiac Tests:  ECG: Atrial fibrillation with RVR with PVCs or aberrant conduction. Right bundle branch block, left anterior fascicular block, bifascicular block, LVH, abnormal EKG. Telemetry findings reviewed: Atrial fibrillation with RVR heart rate in the 130s    Vitals and labs were reviewed: Hemodynamically stable. No labs for today. Prior labs reviewed normal renal functions mildly elevated proBNP. Hemoglobin normal.,  No labs for today    Chest X-ray: Cardiomegaly, no CHF or pneumonia. Venous Dopplers negative for DVT. Echocardiogram:   1. Limited echo November 24, 2020, normal left ventricular systolic function with severe aortic stenosis mean gradient 56 mmHg    Stress test:        Cardiac catheterization:         ASSESSMENT:  · Acute HFpEF, still slightly volume overloaded>> improved  · New onset A. fib with RVR, rate not well controlled.   · No lower extremity DVT on venous ultrasound  · VHD: Symptomatic severe aortic stenosis, medical therapy only as per patient, peak velocity 4.5 m/s, mean gradient 56 mmHg WILMAN 0.8 cm². · Severe COPD  · Hyperlipidemia  · Hypertension well controlled  · Severe hearing loss  · Peripheral edema multifactorial including HFpEF and hypoalbuminemia. Plan:   · Hemodynamically stable but heart rate rate is not well controlled. Continue digoxin 125 mcg daily, and metoprolol to 50 mg twice daily ( increased on 5/15/21) for better rate control. Watch him for another 24 hours if possible   · Check proBNP and BMP. · Continue aspirin 81 mg p.o. daily. Patient not on anticoagulation therapy and would recommend starting Eliquis 5 mg twice daily if he is agreeable. Discussed with the patient about the risks and benefits of anticoagulation therapy and is willing to take Eliquis. Considering severe aortic stenosis, I would not recommend cardioversion on him at this time. · Management of COPD as per primary service. · Continue rest of the current medications  · Poor prognosis without aortic valve surgery either by SAVR or TAVR. Discussed with him again about TAVR which he turned it down. Consider palliative care consult. · Poor prognosis without TAVR. Natalie Yap MD., [place].   [facility]) Cardiology

## 2021-05-16 NOTE — PLAN OF CARE
Problem: Falls - Risk of:  Goal: Will remain free from falls  Description: Will remain free from falls  5/16/2021 1126 by Sandie Sharp RN  Outcome: Met This Shift  5/16/2021 0125 by Adilene Haynes RN  Outcome: Met This Shift     Problem: Pain:  Goal: Pain level will decrease  Description: Pain level will decrease  5/16/2021 1126 by Sandie Sharp RN  Outcome: Met This Shift  5/16/2021 0125 by Adilene Haynes RN  Outcome: Met This Shift     Problem: Pain:  Goal: Control of chronic pain  Description: Control of chronic pain  5/16/2021 1126 by Sandie Sharp RN  Outcome: Met This Shift  5/16/2021 0125 by Adilene Haynes RN  Outcome: Met This Shift     Problem: FLUID AND ELECTROLYTE IMBALANCE  Goal: Fluid and electrolyte balance are achieved/maintained  5/16/2021 0125 by Adilene Haynes RN  Outcome: Met This Shift     Problem: Cardiac Output - Decreased:  Goal: Cardiac rhythm stable  5/16/2021 0125 by Adilene Haynes RN  Outcome: Met This Shift

## 2021-05-16 NOTE — PROGRESS NOTES
[facility]    Admitting Date and Time: 5/13/2021 12:41 PM  Admit Dx: Atrial fibrillation with RVR ([facility] [address] [I48.91]    Subjective:  Patient is being followed for Atrial fibrillation with RVR ([facility] [address] [I48.91]   Pt feels fine, he doesn't have a clue why he is at the hospital.  Still in afib with RVR, patient wants to go home, wife is not sure could take care of him    ROS: denies fever, chills, cp, sob, n/v, HA unless stated above.       enoxaparin  1 mg/kg Subcutaneous BID    metoprolol succinate  25 mg Oral BID    atorvastatin  40 mg Oral Nightly    digoxin  125 mcg Oral Daily    miconazole nitrate   Topical BID    aspirin EC  81 mg Oral Daily    folic acid  1 mg Oral Daily    levothyroxine  25 mcg Oral Daily    predniSONE  2.5 mg Oral Daily    sodium chloride flush  5-40 mL Intravenous 2 times per day    Arformoterol Tartrate  15 mcg Nebulization BID    And    budesonide  0.5 mg Nebulization BID     sodium chloride flush, 5-40 mL, PRN  sodium chloride, 25 mL, PRN  promethazine, 12.5 mg, Q6H PRN   Or  ondansetron, 4 mg, Q6H PRN  polyethylene glycol, 17 g, Daily PRN  acetaminophen, 650 mg, Q6H PRN   Or  acetaminophen, 650 mg, Q6H PRN  perflutren lipid microspheres, 1.5 mL, ONCE PRN         Objective:    /88   Pulse 127   Temp 97.6 °F (36.4 °C) (Oral)   Resp 16   Ht 6' 3\" (1.905 m)   Wt 204 lb 8 oz (92.8 kg)   SpO2 95%   BMI 25.56 kg/m²     General Appearance: alert and oriented x1-2  Skin: warm and dry  Head: normocephalic and atraumatic  Eyes: pupils equal, round, and reactive to light, extraocular eye movements intact, conjunctivae normal  Neck: neck supple and non tender without mass   Pulmonary/Chest: clear to auscultation bilaterally- no wheezes, rales or rhonchi, normal air movement, no respiratory distress  Cardiovascular: IRRR, normal S1 and S2 and no carotid bruits  Abdomen: soft, non-tender, non-distended, normal bowel sounds, no masses or organomegaly  Extremities: no cyanosis, no clubbing and +2 edema  Neurologic: no cranial nerve deficit and speech normal        Recent Labs     05/13/21  1324 05/14/21  0322    139   K 4.3 4.3    107   CO2 29 26   BUN 25* 21   CREATININE 0.7 0.5*   GLUCOSE 122* 95   CALCIUM 9.6 9.2       Recent Labs     05/13/21  1324   WBC 5.2   RBC 3.80   HGB 13.5   HCT 42.1   .8*   MCH 35.5*   MCHC 32.1   RDW 15.2*      MPV 10.5         Assessment:    Active Problems: Aortic valve stenosis    Atrial fibrillation with RVR (HCC)    Acute on chronic diastolic congestive heart failure (HCC)  Resolved Problems:    * No resolved hospital problems. *      Plan:  1. Afib with RVR, not sure about the trigger, unable to be on cradizem drip due to hypotensive, patient was started on digoxin, curently at 125 mcg daily, and metoprolol increase to 50 mg bid , consulted cardiology, started lovenox for AC, switched to eliquis,  2. Acute decompensation of diastolic heart failure, with bilateral lower ext edema, patient needs to be on lasix, but hindered by hypotension  3. Hypothyroidism, continue synthroid. 4.  Hx of COPD, not in exacerbation, continue bronchodilator   5. Dispo, obtain pt ot      NOTE: This report was transcribed using voice recognition software. Every effort was made to ensure accuracy; however, inadvertent computerized transcription errors may be present.   Electronically signed by Thelma Mcgovern MD on 5/16/2021 at 8:50 AM

## 2021-05-16 NOTE — PROGRESS NOTES
Physical Therapy    Facility/Department: [address] INTERMEDIATE 1  Initial Assessment    NAME: Katharine Wagner  : 1935  MRN: [id number]    Date of Service: 2021     has a past medical history of Acute on chronic diastolic congestive heart failure ([facility] [address] Anxiety, Asthma, Bladder cancer ([facility] [address] Chronic sinusitis, Closed displaced midcervical fracture of left femur ([facility] [address] Complication of internal orthopedic device, initial encounter ([facility] [address] Constipation, chronic, COPD, Degenerative joint disease of left knee, Depression, Gastritis, Hearing loss, Hyperlipidemia, Hypertension, Hypothyroidism, Iron deficiency anemia, Left leg pain, Prediabetes, Type 2 diabetes mellitus without complication ([facility] [address] and Vitamin D deficiency. has a past surgical history that includes Upper gastrointestinal endoscopy; Colonoscopy; Femur fracture surgery (2010); fracture surgery; and pr partial hip replacement (Left, 2018). Referring Provider:  Kye Morocho MD    Evaluating PT:  Linn Cloud, PT, DPT [id number]    Room #:  2288/0547-Z  Diagnosis:  A-fib  Precautions:  Fall risk  Equipment Needs:  None. Per chart pt owns walker. SUBJECTIVE:    Pt lives with his wife in a 1 story home with 4 stairs to enter. Bed and bath is on first floor. Pt ambulated with walker PTA. Social history limited due to pt very hard of hearing. OBJECTIVE:   Initial Evaluation  Date:  Treatment Short Term/ Long Term   Goals   Was pt agreeable to Eval/treatment? yes     Does pt have pain?  None reported     Bed Mobility  Rolling: SBA  Supine to sit: SBA  Sit to supine: SBA  Scooting: SBA  independent   Transfers Sit to stand: Min A  Stand to sit: Min A  Stand pivot: Min A with w/w  independendent   Ambulation    15 feet x 2 with w/w Min A  100+ feet with w/w SBA    Stair negotiation: ascended and descended  NT  4 steps with 1 rail Min A   ROM BLE:  WFL     Strength BLE:  Grossly 4/5     Balance Sitting EOB:  independent  Dynamic Standing:  Min A with w/w  Sitting EOB:  independent  Dynamic Standing:  SBA with w/w   AM-PAC 6 Clicks 80/22       Orientation:  WFL. Pt very hard of hearing. Sensation:  Pt denies numbness and tingling to extremities      Patient education  Pt educated on PT objectives during eval and while in the hospital, hand placement during transfers. Patient response to education:   Pt verbalized understanding Pt demonstrated skill Pt requires further education in this area   yes With cueing yes     ASSESSMENT:    Comments:  Pt found in bed with nursing present. No report of dizziness during functional mobility. Cueing required for hand placement during transfers. Mildly unsteady gait but no LOB. Pt assisted into the restroom and then assisted with personal hygiene while standing. At end of eval, pt left in bed with call light in reach and bed alarm on.     Pt's/ family goals   1. None stated    Patient and or family understand(s) diagnosis, prognosis, and plan of care. yes    PLAN OF CARE:    Current Treatment Recommendations     [x] Strengthening     [] ROM   [x] Balance Training   [x] Endurance Training   [x] Transfer Training   [x] Gait Training   [x] Stair Training   [] Positioning   [x] Safety and Education Training   [x] Patient/Caregiver Education   [] HEP  [] Other     Frequency of treatments: 2-5x/week x 1-2 weeks. Time in  1049  Time out  1105      Evaluation Time includes thorough review of current medical information, gathering information on past medical history/social history and prior level of function, completion of standardized testing/informal observation of tasks, assessment of data and education on plan of care and goals.     CPT codes:  [x] Low Complexity PT evaluation 72626  [] Moderate Complexity PT evaluation 70727  [] High Complexity PT evaluation 34954  [] PT Re-evaluation 58888  [] Gait training 23045 _ minutes  [] Therapeutic activities 18008 _ minutes  [] Therapeutic exercises

## 2021-05-17 NOTE — PROGRESS NOTES
[facility] Progress Note    Admitting Date and Time: 5/13/2021 12:41 PM  Admit Dx: Atrial fibrillation with RVR ([facility] [address] [I48.91]    Subjective:  Patient is being followed for Atrial fibrillation with RVR ([facility] [address] [I48.91]   Pt feels fine, he doesn't have a clue why he is at the hospital.  Still in afib with RVR, contacted his wife who stated she couldn't take care of him. ROS: denies fever, chills, cp, sob, n/v, HA unless stated above.       metoprolol succinate  50 mg Oral BID    apixaban  5 mg Oral BID    atorvastatin  40 mg Oral Nightly    digoxin  125 mcg Oral Daily    miconazole nitrate   Topical BID    folic acid  1 mg Oral Daily    levothyroxine  25 mcg Oral Daily    predniSONE  2.5 mg Oral Daily    sodium chloride flush  5-40 mL Intravenous 2 times per day    Arformoterol Tartrate  15 mcg Nebulization BID    And    budesonide  0.5 mg Nebulization BID     sodium chloride flush, 5-40 mL, PRN  sodium chloride, 25 mL, PRN  promethazine, 12.5 mg, Q6H PRN   Or  ondansetron, 4 mg, Q6H PRN  polyethylene glycol, 17 g, Daily PRN  acetaminophen, 650 mg, Q6H PRN   Or  acetaminophen, 650 mg, Q6H PRN  perflutren lipid microspheres, 1.5 mL, ONCE PRN         Objective:    BP (!) 130/96   Pulse 97   Temp 97.7 °F (36.5 °C) (Temporal)   Resp 16   Ht 6' 3\" (1.905 m)   Wt 194 lb 11.2 oz (88.3 kg)   SpO2 94%   BMI 24.34 kg/m²     General Appearance: alert and oriented x1-2  Skin: warm and dry  Head: normocephalic and atraumatic  Eyes: pupils equal, round, and reactive to light, extraocular eye movements intact, conjunctivae normal  Neck: neck supple and non tender without mass   Pulmonary/Chest: clear to auscultation bilaterally- no wheezes, rales or rhonchi, normal air movement, no respiratory distress  Cardiovascular: IRRR, normal S1 and S2 and no carotid bruits  Abdomen: soft, non-tender, non-distended, normal bowel sounds, no masses or organomegaly  Extremities: no cyanosis, no clubbing and +2 edema  Neurologic: no cranial nerve deficit and speech normal        Recent Labs     05/16/21  1245      K 4.5      CO2 31*   BUN 13   CREATININE 0.6*   GLUCOSE 105*   CALCIUM 9.3       No results for input(s): WBC, RBC, HGB, HCT, MCV, MCH, MCHC, RDW, PLT, MPV in the last 72 hours. Assessment:    Active Problems: Aortic valve stenosis    Atrial fibrillation with RVR (HCC)    Acute on chronic diastolic congestive heart failure (HCC)  Resolved Problems:    * No resolved hospital problems. *      Plan:  1. Afib with RVR, not sure about the trigger, unable to be on cradizem drip due to hypotensive, patient was started on digoxin, curently at 125 mcg daily, and metoprolol increase to 50 mg bid , consulted cardiology, started lovenox for AC, switched to eliquis,  2. Acute decompensation of diastolic heart failure, with bilateral lower ext edema, start lasix, and monitor  3. Hypothyroidism, continue synthroid. 4.  Hx of COPD, not in exacerbation, continue bronchodilator   5. Dispo, obtain pt ot scored 17/24, wife can't take care of him at home, plans for SNF      NOTE: This report was transcribed using voice recognition software. Every effort was made to ensure accuracy; however, inadvertent computerized transcription errors may be present.   Electronically signed by William Musa MD on 5/17/2021 at 9:29 AM

## 2021-05-17 NOTE — CARE COORDINATION
CASE MANAGEMENT. .. Chart reviewed. Met with patient at the bedside and his wife on the phone to discuss discharge plans. I informed them that [facility] can accept pending insurance approval. Mr Celi Barrios is adamant that he will not go to [facility]. States he is capable of ambulating with his walker without assistance. He is agreeable to [facility]. Has history with Jeri. Called referral to [facility] with Jeri. No answer. Left . [facility] order obtained. Mrs Celi Barrios confirmed that he will also continue with his caregivers from [facility] [employer] for 2hours x 5days per week. He will require transport home. Typically uses [email or web address]. Dr Jasper Nunez updated on the above. In the meantime, patient has been started on iv lasix bid and nursing will assess o2 sats. Will cont to follow along. CASE MANAGEMENT. .. [place] [facility] accepted patient. Will need notified at discharge [phone].

## 2021-05-17 NOTE — PROGRESS NOTES
Occupational Therapy  OCCUPATIONAL THERAPY INITIAL EVALUATION      Date:2021  Patient Name: Reece Mendoza  MRN: [id number]  : 1935  Room: [address] [place] [address]    Referring Provider: Charles Campbell MD    Evaluating OT: Mookie Chen OTR/L [id number]    AM-PAC Daily Activity Raw Score: 1624    Recommended Adaptive Equipment: TBD    Diagnosis: afib with RVR. Pertinent Medical History: CHF, asthma, COPD, HTN, DM   Precautions:  Falls     Home Living: Pt lives with wife in a single story home. Bathroom setup: walk in shower with seat, standard commode     Prior Level of Function: PLOF from pt report, pt is a questionable historian. Pt reports daily \"maid\" to assist in bathing, Mod I in toileting, grooming and dressing. Wife and \"maid\" assist in IADLs; completed functional mobility with WW.     Pain Level: no reported pain    Cognition: A&O: 2-3/4. Pt is oriented to self and hospital. Year but not month. Some confusion regarding situation throughout session. Further limited by significant hard of hearing. Problem solving:  fair   Judgement/safety:  fair     Functional Assessment:   Initial Eval Status  Date: 21 Treatment session:  Short Term Goals     Feeding Set up     Grooming SBA  Standing sink level for hand hygiene     Mod I   UB Dressing Min A  Management of hospital gown  Mod I   LB Dressing Max A  Donning B socks seated EOB  Min A    Bathing Mod A  Min A   Toileting Min A  Use of grab bar for support in transfer  Assist minimally in posterior corina care  Mod I   Bed Mobility  Supine to sit: Min A     Functional Transfers STS: CGA  Mod I   Functional Mobility CGA with [employer]  Household distance  Mod I during ADLs   Balance Sitting: fair plus    Standing: fair at [employer]     Activity Tolerance Fair minus  standing bev x7-8 min with fair plus balance during self care tasks             Treatment: Patient educated on techniques for completion of ADL, safe functional transfers and functional mobility. for safe mobility and completion of ADLs                             []Cognitive retraining to improve problem solving skills & safe participation in ADLs/IADLs     []Sensory re-education techniques to improve extremity awareness, maintain skin integrity and improve hand function                             []Visual/Perceptual retraining to improve body awareness and safety during transfers and ADLs  []Splinting/positioning needs to maintain joint/skin integrity and contracture prevention  [x]Therapeutic activity to improve functional performance during ADLs                                        [x]Therapeutic exercise to improve tolerance and functional strength for ADLs   [x]Balance retraining/tolerance tasks for facilitation of postural control with dynamic challenges during ADLs  []Neuromuscular re-education to facilitate righting/equilibrium reactions, midline orientation, scapular stability/mobility, normalize muscle tone and facilitate active functional movement                        []Delirium prevention/treatment    [x]Positioning to improve functional independence and decrease risk of skin breakdown  []Other:     Rehab Potential: Good for established goals     Patient/Family Goal: To get home. Patient and/or family were instructed on functional diagnosis, prognosis/goals and OT plan of care. Pt verbalized fair understanding. Upon arrival, patient supine in bed. At end of session, patient seated in armchair with call light and phone within reach, all lines and tubes intact. Pt would benefit from continued skilled OT to increase safety and independence with completion of ADL/IADL tasks for functional independence and quality of life.  Bed/chair alarm: ON    Low Evaluation 64697  Time In: 946   Time Out: 1010       Min Units   Therapeutic Ex 31960     Therapeutic Activities 52503 3    ADL/Self Care 56068 12 1   Orthotic Management 95412     Neuro Re-Ed 60445     TOTAL TIMED TREATMENT 15 1

## 2021-05-17 NOTE — PROGRESS NOTES
succinate (TOPROL XL) extended release tablet 50 mg  50 mg Oral BID Cindy Edmond MD   50 mg at 05/16/21 2125    apixaban (ELIQUIS) tablet 5 mg  5 mg Oral BID Manisha Serrano MD   5 mg at 05/16/21 2125    atorvastatin (LIPITOR) tablet 40 mg  40 mg Oral Nightly Elsypaige Hernandez APRN - CNP   40 mg at 05/16/21 2125    digoxin (LANOXIN) tablet 125 mcg  125 mcg Oral Daily Elsy Pantsayra APRN - CNP   125 mcg at 05/16/21 1227    miconazole nitrate 2 % ointment   Topical BID Cindy Edmond MD   Given at 86/93/73 1530    folic acid (FOLVITE) tablet 1 mg  1 mg Oral Daily Amanda Yepez MD   1 mg at 05/16/21 7744    levothyroxine (SYNTHROID) tablet 25 mcg  25 mcg Oral Daily Amanda Yepez MD   25 mcg at 05/17/21 0610    predniSONE (DELTASONE) tablet 2.5 mg  2.5 mg Oral Daily Amanda Yepez MD   2.5 mg at 05/16/21 1398    sodium chloride flush 0.9 % injection 5-40 mL  5-40 mL Intravenous 2 times per day Amanda Yepez MD   10 mL at 05/16/21 2125    sodium chloride flush 0.9 % injection 5-40 mL  5-40 mL Intravenous PRN Amanda Yepez MD        0.9 % sodium chloride infusion  25 mL Intravenous PRN Amanda Yepez MD        promethazine (PHENERGAN) tablet 12.5 mg  12.5 mg Oral Q6H PRN Amanda Yepez MD        Or    ondansetron (ZOFRAN) injection 4 mg  4 mg Intravenous Q6H PRN Amanda Yepez MD        polyethylene glycol (GLYCOLAX) packet 17 g  17 g Oral Daily PRN Amanda Yepez MD        acetaminophen (TYLENOL) tablet 650 mg  650 mg Oral Q6H PRN Amanda Yepez MD        Or    acetaminophen (TYLENOL) suppository 650 mg  650 mg Rectal Q6H PRN Amanda Yepez MD        perflutren lipid microspheres (DEFINITY) injection 1.65 mg  1.5 mL Intravenous ONCE PRN Amanda Yepez MD        Arformoterol Tartrate (BROVANA) nebulizer solution 15 mcg  15 mcg Nebulization BID Amanda Yepez MD   15 mcg at 05/17/21 0835    And    budesonide (PULMICORT) nebulizer suspension 500 mcg  0.5 mg Nebulization BID Zay De Los Santos MD   500 mcg at 05/17/21 0835      sodium chloride         Physical Exam:  BP (!) 117/90   Pulse 136   Temp 97.8 °F (36.6 °C) (Oral)   Resp 18   Ht 6' 3\" (1.905 m)   Wt 194 lb 11.2 oz (88.3 kg)   SpO2 92%   BMI 24.34 kg/m²   Weight change: -9 lb 12.8 oz (-4.445 kg)  Wt Readings from Last 3 Encounters:   05/17/21 194 lb 11.2 oz (88.3 kg)   05/13/21 201 lb (91.2 kg)   05/04/21 201 lb (91.2 kg)     The patient is awake, alert and in no discomfort or distress. No gross musculoskeletal deformity is present. No significant skin or nail changes are present. Gross examination of head, eyes, nose and throat are negative. Jugular venous pressure is normal and no carotid bruits are present. Normal respiratory effort is noted with no accessory muscle usage present. Lung fields are clear to ascultation. Cardiac examination is notable for an irregular rhythm with no palpable thrill. Distant cardiac tones are present with no identified gallop rhythm or cardiac murmur. A benign abdominal examination is present with no masses or organomegaly. Intact pulses are present throughout all extremities and no peripheral edema is present. No focal neurologic deficits are present. Intake/Output:    Intake/Output Summary (Last 24 hours) at 5/17/2021 0855  Last data filed at 5/16/2021 0856  Gross per 24 hour   Intake 240 ml   Output --   Net 240 ml     No intake/output data recorded. Laboratory Tests:  Lab Results   Component Value Date    CREATININE 0.6 (L) 05/16/2021    BUN 13 05/16/2021     05/16/2021    K 4.5 05/16/2021     05/16/2021    CO2 31 (H) 05/16/2021     No results for input(s): CKTOTAL, CKMB in the last 72 hours.     Invalid input(s): TROPONONI  No results found for: BNP  Lab Results   Component Value Date    WBC 5.2 05/13/2021    RBC 3.80 05/13/2021    HGB 13.5 05/13/2021    HCT 42.1 05/13/2021    .8 05/13/2021    MCH 35.5 05/13/2021 MCHC 32.1 05/13/2021    RDW 15.2 05/13/2021     05/13/2021    MPV 10.5 05/13/2021     No results for input(s): ALKPHOS, ALT, AST, PROT, BILITOT, BILIDIR, LABALBU in the last 72 hours. No results found for: MG  Lab Results   Component Value Date    PROTIME 14.9 05/14/2021    INR 1.3 05/14/2021     Lab Results   Component Value Date    TSH 1.850 05/13/2021     No components found for: CHLPL  Lab Results   Component Value Date    TRIG 39 12/23/2020    TRIG 48 05/20/2020    TRIG 81 01/20/2020     Lab Results   Component Value Date    HDL 63 12/23/2020    HDL 56 05/20/2020    HDL 30 01/20/2020     Lab Results   Component Value Date    LDLCALC 100 (H) 12/23/2020 [address] 104 05/20/2020 [address] 84 01/20/2020       Cardiac Tests:  Telemetry findings reviewed: atrial fibrillation with a mean ventricular response of approximately 90 bpm, no new tachy/bradyarrhythmias overnight      ASSESSMENT / PLAN: On a clinical basis, the patient presently appears compensated from a cardiovascular standpoint with no clinical evidence of volume overload and improve rate control of his atrial fibrillation currently is presently at rest.  He remains opposed to consideration of aortic valve intervention in spite of his severe aortic stenosis with a poor prognosis independent of means of conservative management in the absence of consideration of transcatheter aortic valve replacement. Presently, a rate control and anticoagulation strategy will be maintained with a poor prognosis presently noted and recommendation of the consideration of palliative assessment and as appropriate consideration of hospice care. Additional management including that of his chronic obstructive lung disease will be deferred to the primary care service. Note: This report was completed utilizing computer voice recognition software.  Every effort has been made to ensure accuracy, however; inadvertent computerized transcription errors may be present. Dave Rodriges.  Mendel Bold, [address] [facility]

## 2021-05-18 NOTE — PROGRESS NOTES
Physical Therapy  Facility/Department: [address] INTERMEDIATE 1  Daily Treatment Note  NAME: Tina Luis  : 1935  MRN: [id number]    Date of Service: 2021      Patient Diagnosis(es): The encounter diagnosis was Atrial fibrillation with RVR ([facility] [address] has a past medical history of Acute on chronic diastolic congestive heart failure ([facility] [address] Anxiety, Asthma, Bladder cancer ([facility] [address] Chronic sinusitis, Closed displaced midcervical fracture of left femur ([facility] [address] Complication of internal orthopedic device, initial encounter ([facility] [address] Constipation, chronic, COPD, Degenerative joint disease of left knee, Depression, Gastritis, Hearing loss, Hyperlipidemia, Hypertension, Hypothyroidism, Iron deficiency anemia, Left leg pain, Prediabetes, Type 2 diabetes mellitus without complication ([facility] [address] and Vitamin D deficiency. has a past surgical history that includes Upper gastrointestinal endoscopy; Colonoscopy; Femur fracture surgery (2010); fracture surgery; and pr partial hip replacement (Left, 2018).    Referring Provider:  Surya Russell MD     Evaluating PT:  Cleo Cantor PT, DPT [id number]     Room #:  8388/0468-P  Diagnosis:  A-fib  Precautions:  Fall risk  Equipment Needs:  None. Per chart pt owns walker.     SUBJECTIVE:     Pt lives with his wife in a 1 story home with 4 stairs to enter. Bed and bath is on first floor. Pt ambulated with walker PTA. Social history limited due to pt very hard of hearing.      OBJECTIVE:    Initial Evaluation  Date:  Treatment  2021  Short Term/ Long Term   Goals   Was pt agreeable to Eval/treatment? yes  yes , with encouragement      Does pt have pain?  None reported       Bed Mobility  Rolling: SBA  Supine to sit: SBA  Sit to supine: SBA  Scooting: SBA  supine to sit : min assist  independent   Transfers Sit to stand: Min A  Stand to sit: Min A  Stand pivot: Min A with w/w  Sit  To stand : mod assist   SPS : mod assist with ww independendent   Ambulation    15 feet x 2 with w/w Min A  30 feet x 1 with ww min assist  100+ feet with w/w SBA    Stair negotiation: ascended and descended  NT   4 steps with 1 rail Min A   ROM BLE:  WFL       Strength BLE:  Grossly 4/5       Balance Sitting EOB:  independent  Dynamic Standing:  Min A with w/w   Sitting EOB:  independent  Dynamic Standing:  SBA with w/w   AM-PAC 6 Clicks 52/10  98/ 24         Orientation:  WFL. Pt very hard of hearing. slow response to questioning and commands        Patient education  Pt educated on falls risk, importance of mobility, hand placement during transfers, ww safety .      Patient response to education:   Pt verbalized understanding Pt demonstrated skill Pt requires further education in this area   yes Needs cues and assist  yes      ASSESSMENT:  Pt worse with mobility as compared to eval. Safety concerns about going home. Pt appears confused. Notified nursing. She states Valentin Sepulveda goes in and out. \"     Comments:  Pt found in bed heavily soiled in urine and feces. dep assist for hygiene. Brief donned for mobility. Transferred to chair with mod assist. Required encouragement to walk. L LE with arthritic changes. High fall risk            PLAN OF CARE:     Current Treatment Recommendations      [x]? Strengthening     []? ROM   [x]? Balance Training   [x]? Endurance Training   [x]? Transfer Training   [x]? Gait Training   [x]? Stair Training   []? Positioning   [x]? Safety and Education Training   [x]? Patient/Caregiver Education   []? HEP  []? Other      Frequency of treatments: 2-5x/week x 1-2 weeks.     Time in  1330   Time out  1354        Con't with PT POC      CPT codes:  []? Low Complexity PT evaluation [id number]  []? Moderate Complexity PT evaluation 12705  []? High Complexity PT evaluation [id number]  []? PT Re-evaluation [id number]  []? Gait training 68078 _ minutes  [x]? Therapeutic activities 85371 24 minutes  []?  Therapeutic exercises 44906 _ minutes        Francine Brochure   PT 4450

## 2021-05-18 NOTE — H&P
[facility] MANAGEMENT. .. Met with patients son, William Ocampo. He expressed concerns regarding patients increased confusion. States patient was asking for help going to the living room. Says this is new from yesterday when he visited. Dr Yifan Bateman notified, ua/culture and ct head was ordered. IN ADDENDUM. .. Nieves started precert.

## 2021-05-18 NOTE — ADT AUTH CERT
Utilization Reviews       Atrial Fibrillation - Care Day 2 (5/14/2021) by Brandon Schaeffer, RN       Review Status Review Entered   Completed 5/18/2021 09:15      Criteria Review      Care Day: 2 Care Date: 5/14/2021 Level of Care: Intermediate Care    Guideline Day 2    Level Of Care    (X) ICU, intermediate care, or telemetry to discharge    5/18/2021 9:15 AM EDT by Rubi Vick      5/14 - intermediate unit    Clinical Status    ( ) * Hemodynamic stability    5/18/2021 9:15 AM EDT by Stacie Broussard 120/d, 90/d    ( ) * Sinus rhythm or acceptable ventricular rate    5/18/2021 9:15 AM EDT by Rubi Vick      AFIB controlled rate/RVR on monitor    ( ) * No evidence of myocardial ischemia    ( ) * Mental status at baseline    5/18/2021 9:15 AM EDT by Stacie Broussard a&ox3, confused to situation    ( ) * Tachypnea absent    5/18/2021 9:15 AM EDT by Rubi Vick      RR 20    ( ) * Hypoxemia absent    ( ) * Anticoagulants regimen for next level of care established    ( ) * Discharge plans and education understood    Activity    ( ) * Ambulatory or acceptable for next level of care    Routes    (X) * Oral hydration, medications, and diet    5/18/2021 9:15 AM EDT by Rubi Vick      cont cardiac diet    Interventions    (X) Cardiac monitoring    5/18/2021 9:15 AM EDT by Rubi Vick      continuous    (X) Possible PT/PTT if anticoagulated    5/18/2021 9:15 AM EDT by Rubi Vick      pt 14.9/inr 1.3    Medications    (X) Possible rate and rhythm control medications    5/18/2021 9:15 AM EDT by Rubi Vick      toprol xl 12.5mg qd   iv lanoxin 250mcg x3 doses    * Milestone   Additional Notes   5/14    remains on intermediate unit    97.8 20  120/d, 90/d, 109/81, 126/86    97% 2lnc  AFIB controlled rate/RVR on monitor       pro bnp 1,256    alb.  2.9   pt 14.9/inr 1.3      IM A/P:   Afib with RVR, not sure about the trigger, unable to be on cradizem drip due to hypotensive, patient was started on digoxin, will obtain an echo, consulted cardiology, patient will need AC, will discuss with cardiology   Acute decompensation of diastolic heart failure, with bilateral lower ext edema, patient needs to be on lasix, but hindered by hypotension   Hypothyroidism, continue synthroid. Hx of COPD, not in exacerbation, continue bronchodilator           CARDIO A/P:   Reason for Consult: Congestive heart failure, atrial fibrillation      HISTORY OF PRESENT ILLNESS:    This 70-year-old male is known to [facility] cardiology by Dr. Leon Garcia was last evaluated as an outpatient on December 1, 2020 for severe aortic stenosis.  The patient was not a surgical candidate and refused a TAVR.       He presented to the emergency room from [facility] for an irregular heartbeat.  He was having swelling and shortness of breath and was in new onset atrial fibrillation with a heart rate of 146.  The patient is unable to tell me why he went to the [address] denies any chest pain or dyspnea or palpitations.  He does admit to some swelling of the lower extremities but cannot tell me when it started. Haylie Johnson had no cough or fever       Blood pressure on admission was 107/72 and later dropped to 76/53. Haylie Johnson became tachypneic and his O2 saturation was 100% on room air.       Potassium was 4.3 with a BUN of 25 and a creatinine of 0.7 and proBNP of 1360, troponin 0.02, CBC unremarkable.       Chest x-ray showed no acute process.       EKG showed atrial fibrillation with rapid ventricular response and bifascicular block and nonspecific T wave changes       He was treated with a fluid bolus , digoxin 0.25 IV.     He remains in atrial fibrillation with borderline rapid    ventricular response.  Loading doses of digoxin has been    ordered as well as a 2D echo.       PHYSICAL EXAM:    BP (!) 120/0 Comment: doppler  Pulse 122   Temp 97.8 °F (36.6 °C) (Oral)   Resp 20   Ht 6' 3\" (1.905 m)   Wt 204 lb 9.4 oz (92.8 kg)   SpO2 97%   BMI 25.57 kg/m²    CONST:  Well developed, well nourished who appears of stated age. Awake, alert and cooperative. No apparent distress. HEENT:    Head- Normocephalic, atraumatic    Eyes- Conjunctivae pink, anicteric   Throat- Oral mucosa pink and moist   Neck-  No stridor, trachea midline, no jugular venous distention. No carotid bruit.     CHEST: Chest symmetrical and non-tender to palpation. No accessory muscle use or intercostal retractions   RESPIRATORY: Lung sounds - clear throughout fields    CARDIOVASCULAR:      Heart Inspection- shows no noted pulsations   Heart Palpation- no heaves or thrills; PMI is non-displaced    Heart Ausculation- irregularly irregular rate and rhythm,  8/5 systolic murmur. No s3, s4 or rub    PV: Positive lower extremity edema on the right, no edema on the left lower extremity. No varicosities. Pedal pulses palpable, no clubbing or cyanosis    ABDOMEN: Soft, non-tender to light palpation. Bowel sounds present. No palpable masses no organomegaly; no abdominal bruit   MS: Good muscle strength and tone. No atrophy or abnormal movements. : Deferred   SKIN: Warm and dry no statis dermatitis or ulcers    NEURO / PSYCH: Oriented to person, place and time but disoriented to situation and cannot tell me who the president is. Speech clear and appropriate. Follows all commands. Pleasant affect           ASSESSMENT AND PLAN:   1. Acute diastolic CHF:    LE US no DVT. See below regarding afib and AS. Low albumin. Pre-renal azotemia. Watch volume. Nas hose.        2. New onset Afib: Rate control carefully. Lovenox for now. Continue loading dig. Low dose toprol and dig po as maintenance. NOAC on discharge. Consider DCCV if patient does not compensate readily.       3. VHD: Hx of severe AS for which he requested only medical    management.       4. COPD: Per    5. Lipids: Statin.     6. HTN: Observe.       cont brovana, pulmicort aerosols   cont asa, folic acid, synthroid, prednisone    lipitor 40mg qhs   toprol xl 12.5mg qd    iv lanoxin 250mcg x3 doses    po lanoxin 125mcg qd . . start 5/15    sq lovenox 90mg bid       SW NOTE:   Social Work/Discharge Planning:   Met with patient and completed initial assessment.  Explained Social Work role and discussed transition of care/discharge planning.  Patient lives with his wife in a one story house with four steps to enter.  PTA patient uses a wheeled walker.  Patient unable to answer all the assessment questions.   Called patient wife Prashant Maria (ph: [phone]) and confirmed plan for patient to return home at discharge. Michlele Carter has caregiver through [employer] for two hours a day five days a week provided by the [facility] Bustamante has a bedside commode, extended tub bench and a transfer wheelchair. Michelle Carter has a snf history with [facility] PCP is Dr. Teresa Duque and pharmacy is [facility] in [address] [facility] prefers to use [facility] if patient is requiring hhc at discharge. Ronald Riggins states her  will need transport home at discharge.     Will continue to follow and assist with discharge planning.

## 2021-05-18 NOTE — PLAN OF CARE
Problem: Falls - Risk of:  Goal: Will remain free from falls  Description: Will remain free from falls  Outcome: Met This Shift     Problem: Falls - Risk of:  Goal: Absence of physical injury  Description: Absence of physical injury  Outcome: Met This Shift     Problem: Pain:  Goal: Pain level will decrease  Description: Pain level will decrease  Outcome: Met This Shift     Problem: Skin Integrity:  Goal: Absence of new skin breakdown  Description: Absence of new skin breakdown  Outcome: Met This Shift     Problem: Musculor/Skeletal Functional Status  Goal: Absence of falls  Outcome: Met This Shift

## 2021-05-18 NOTE — PLAN OF CARE
Problem: Falls - Risk of:  Goal: Will remain free from falls  Description: Will remain free from falls  Outcome: Met This Shift  Goal: Absence of physical injury  Description: Absence of physical injury  Outcome: Met This Shift     Problem: Pain:  Goal: Pain level will decrease  Description: Pain level will decrease  Outcome: Met This Shift  Goal: Control of acute pain  Description: Control of acute pain  Outcome: Met This Shift  Goal: Control of chronic pain  Description: Control of chronic pain  Outcome: Met This Shift     Problem: Skin Integrity:  Goal: Will show no infection signs and symptoms  Description: Will show no infection signs and symptoms  Outcome: Met This Shift  Goal: Absence of new skin breakdown  Description: Absence of new skin breakdown  Outcome: Met This Shift     Problem: Skin Integrity:  Goal: Will show no infection signs and symptoms  Description: Will show no infection signs and symptoms  Outcome: Met This Shift  Goal: Absence of new skin breakdown  Description: Absence of new skin breakdown  Outcome: Met This Shift     Problem: Musculor/Skeletal Functional Status  Goal: Absence of falls  Outcome: Met This Shift

## 2021-05-18 NOTE — DISCHARGE INSTR - COC
Continuity of Care Form    Patient Name: Sterling Zee   :  1935  MRN:  [id number]    Admit date:  2021  Discharge date:  21    Code Status Order: Full Code   Advance Directives:   Advance Care Flowsheet Documentation     Date/Time Healthcare Directive Type of Healthcare Directive Copy in [address] Agent's Name Healthcare Agent's Phone Number    21 1626  Yes, patient has an advance directive for healthcare treatment  Health care treatment directive  No, copy requested from family  Spouse  Linus Richey  [phone]          Admitting Physician:  Alfredito Snyder MD  PCP: Arleen Omalley DO    Discharging Nurse: [facility] Unit/Room#: 4154/2332-E  Discharging Unit Phone Number: [phone]    Emergency Contact:   Extended Emergency Contact Information  Primary Emergency Contact: Rebecca Taylorjonh  Address: [address] Phone: [phone]  Relation: Spouse    Past Surgical History:  Past Surgical History:   Procedure Laterality Date    COLONOSCOPY      FEMUR FRACTURE SURGERY  2010    IM nailing left femoral shaft    FRACTURE SURGERY      TX PARTIAL HIP REPLACEMENT Left 2018    HIP HEMIARTHROPLASTY LEFT, WITH  REMOVAL OF PREVIOUS HARDWARE, INSERTION OF MEDICATION DELIVERY SYSTEM (WITH INTRAOPERATIVE FLUOROSCOPY) performed by Corin Pablo MD at [address]      10/10       Immunization History:   Immunization History   Administered Date(s) Administered    COVID-19, Pfizer, PF, 30mcg/0.3mL 2021, 2021    Pneumococcal Conjugate 13-valent (Thomas Pean) 2019    Pneumococcal Polysaccharide ([id number]) 2020       Active Problems:  Patient Active Problem List   Diagnosis Code    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 [address] J44.9    Mixed hyperlipidemia E78.2    Chronic osteomyelitis of left femur (HCC) M86.652    Degenerative joint disease of left knee M17.12    Acquired hypothyroidism E03.9    Primary osteoarthritis involving multiple joints M89.49    Anxiety F41.9    Vitamin D deficiency E55.9    Pulmonary emphysema (HCC) J43.9    H/O calcium pyrophosphate deposition disease (CPPD) Z87.39    Unexplained weight loss R63.4    Mild intermittent asthma without complication N32.67    Elevated liver function tests R79.89    Hyponatremia E87.1    Unable to walk R26.2    Generalized weakness R53.1    Low vitamin B12 level E53.8    Recurrent falls R29.6    Essential hypertension I10    Aortic valve stenosis I35.0    Atrial fibrillation with RVR ([facility]) I48.91    Acute on chronic diastolic congestive heart failure ([facility]) I50.33       Isolation/Infection:   Isolation          No Isolation        Patient Infection Status     Infection Onset Added Last Indicated Last Indicated By Review Planned Expiration Resolved Resolved By    None active    Resolved    COVID-19 Rule Out 11/17/20 11/17/20 11/17/20 COVID-19 Ambulatory (Ordered)   11/19/20 Rule-Out Test Resulted    COVID-19 Rule Out 08/20/20 08/20/20 08/20/20 COVID-19 Ambulatory (Ordered)   08/22/20 Rule-Out Test Resulted          Nurse Assessment:  Last Vital Signs: /72   Pulse 80   Temp 98.9 °F (37.2 °C) (Oral)   Resp 18   Ht 6' 3\" (1.905 m)   Wt 194 lb 11.2 oz (88.3 kg)   SpO2 94%   BMI 24.34 kg/m²     Last documented pain score (0-10 scale): Pain Level: 0  Last Weight:   Wt Readings from Last 1 Encounters:   05/17/21 194 lb 11.2 oz (88.3 kg)     Mental Status:  disoriented, thought processes intact and able to concentrate and follow conversation    IV Access:  - None    Nursing Mobility/ADLs:  Walking   Assisted  Transfer  Assisted  Bathing  Assisted  Dressing  Assisted  Toileting  Assisted  Feeding  Assisted  Med Admin  Assisted  Med Delivery   crushed    Wound Care Documentation and Therapy:  Incision 05/14/18 Hip Left (Active)   Number of days: 1099       Incision 05/14/18 Knee Left (Active)   Number of days: 8209        Elimination:  Continence:   · Bowel: No  · Bladder: No  Urinary Catheter: None   Colostomy/Ileostomy/Ileal Conduit: No       Date of Last BM: ***    Intake/Output Summary (Last 24 hours) at 5/18/2021 1119  Last data filed at 5/17/2021 1512  Gross per 24 hour   Intake 120 ml   Output --   Net 120 ml     I/O last 3 completed shifts: In: 360 [P.O.:360]  Out: -     Safety Concerns: At Risk for Falls and Aspiration Risk    Impairments/Disabilities:      Hearing    Nutrition Therapy:  Current Nutrition Therapy:   - Oral Diet:  Cardiac    Routes of Feeding: Oral  Liquids: Nectar Thick Liquids  Daily Fluid Restriction: no  Last Modified Barium Swallow with Video (Video Swallowing Test): done on 5/24/21/minced moist    Treatments at the Time of Hospital Discharge:   Respiratory Treatments: ***  Oxygen Therapy:  is not on home oxygen therapy.   Ventilator:    - No ventilator support    Rehab Therapies: Physical Therapy and Occupational Therapy  Weight Bearing Status/Restrictions: No weight bearing restirctions  Other Medical Equipment (for information only, NOT a DME order):  walker  Other Treatments: ***    Patient's personal belongings (please select all that are sent with patient):  Hearing Aides bilateral    RN SIGNATURE:  Electronically signed by Shabana Carter RN on 5/24/21 at 4:27 PM EDT    CASE MANAGEMENT/SOCIAL WORK SECTION    Inpatient Status Date: 5/13/2021    Readmission Risk Assessment Score:  Readmission Risk              Risk of Unplanned Readmission:  14           [facility] [address]  [address] [facility]    Dialysis Facility (if applicable)   · Name:  · Address:  · Dialysis Schedule:  · Phone:  · Fax:    / signature: Electronically signed by Parul Salazar Sajan Benitez on 5/18/21 at 3:24 PM EDT    PHYSICIAN SECTION    Prognosis: Good    Condition at Discharge: Stable    Rehab Potential (if transferring to Rehab): Good    Recommended Labs or Other Treatments After Discharge: ***    Physician Certification: I certify the above information and transfer of Magda Peña  is necessary for the continuing treatment of the diagnosis listed and that he requires [place] for less 30 days.      Update Admission H&P: No change in H&P    PHYSICIAN SIGNATURE:  Electronically signed by Justina Trejo MD

## 2021-05-18 NOTE — PROGRESS NOTES
INPATIENT CARDIOLOGY FOLLOW-UP    Name: Destiny Allen    Age: 80 y.o. Date of Admission: 5/13/2021 12:41 PM    Date of Service: 5/18/2021    Chief Complaint: Follow-up for acute superimposed upon chronic diastolic heart failure, paroxysmal atrial fibrillation, aortic valve disease, chronic obstructive lung disease, hypertension    Interim History: The patient remains subjectively compensated from a cardiovascular standpoint and continues to request discharge. Incomplete maintenance of intake and output continues to limit adequate assessment of his response to therapy with repeat laboratory assessment of renal function and electrolytes pending. Improve rate control of his atrial fibrillation is presently noted following modification of medical therapy. Review of Systems: The remainder of a complete multisystem review including consitutional, central nervous, respiratory, circulatory, gastrointestinal, genitourinary, endocrinologic, hematologic, musculoskeletal and psychiatric are negative. Problem List:  Patient Active Problem List   Diagnosis    Acquired varus deformity knee    COPD without exacerbation ([address]    Mixed hyperlipidemia    Chronic osteomyelitis of left femur ([address]    Degenerative joint disease of left knee    Acquired hypothyroidism    Primary osteoarthritis involving multiple joints    Anxiety    Vitamin D deficiency    Pulmonary emphysema ([address]    H/O calcium pyrophosphate deposition disease (CPPD)    Unexplained weight loss    Mild intermittent asthma without complication    Elevated liver function tests    Hyponatremia    Unable to walk    Generalized weakness    Low vitamin B12 level    Recurrent falls    Essential hypertension    Aortic valve stenosis    Atrial fibrillation with RVR (HCC)    Acute on chronic diastolic congestive heart failure (HCC)       Allergies:   Allergies   Allergen Reactions    Cephalexin     Sulfa Antibiotics        Current Medications:  Current Facility-Administered Medications   Medication Dose Route Frequency Provider Last Rate Last Admin    furosemide (LASIX) injection 40 mg  40 mg Intravenous BID Catie Borrero MD   40 mg at 05/17/21 1745    metoprolol succinate (TOPROL XL) extended release tablet 75 mg  75 mg Oral BID Michael Vasquez MD   75 mg at 05/18/21 0739    apixaban (ELIQUIS) tablet 5 mg  5 mg Oral BID Jeromy Norman MD   5 mg at 05/17/21 2238    atorvastatin (LIPITOR) tablet 40 mg  40 mg Oral Nightly Elsy Pantpas APRN - CNP   40 mg at 05/17/21 2238    digoxin (LANOXIN) tablet 125 mcg  125 mcg Oral Daily Elsy PantpasNAE - CNP   125 mcg at 05/18/21 0739    miconazole nitrate 2 % ointment   Topical BID Catie Borrero MD   Given at 38/55/99 0633    folic acid (FOLVITE) tablet 1 mg  1 mg Oral Daily Han Beaulieu MD   1 mg at 05/17/21 8538    levothyroxine (SYNTHROID) tablet 25 mcg  25 mcg Oral Daily Han Beaulieu MD   25 mcg at 05/18/21 0517    predniSONE (DELTASONE) tablet 2.5 mg  2.5 mg Oral Daily Han Beaulieu MD   2.5 mg at 05/17/21 1216    sodium chloride flush 0.9 % injection 5-40 mL  5-40 mL Intravenous 2 times per day Han Beaulieu MD   10 mL at 05/17/21 2246    sodium chloride flush 0.9 % injection 5-40 mL  5-40 mL Intravenous PRN Han Beaulieu MD        0.9 % sodium chloride infusion  25 mL Intravenous PRN Han Beaulieu MD        promethazine (PHENERGAN) tablet 12.5 mg  12.5 mg Oral Q6H PRN Han Beaulieu MD        Or    ondansetron (ZOFRAN) injection 4 mg  4 mg Intravenous Q6H PRN Han Beaulieu MD        polyethylene glycol (GLYCOLAX) packet 17 g  17 g Oral Daily PRN Han Beaulieu MD        acetaminophen (TYLENOL) tablet 650 mg  650 mg Oral Q6H PRN Han Beaulieu MD        Or    acetaminophen (TYLENOL) suppository 650 mg  650 mg Rectal Q6H PRN Han Beaulieu MD        perflutren lipid microspheres (DEFINITY) injection 1.65 mg 1.5 mL Intravenous ONCE PRN Ana Valenzuela MD        Arformoterol Tartrate [facility]) nebulizer solution 15 mcg  15 mcg Nebulization BID Ana Valenzuela MD   15 mcg at 05/17/21 2028    And    budesonide (PULMICORT) nebulizer suspension 500 mcg  0.5 mg Nebulization BID Ana Valenzuela MD   500 mcg at 05/17/21 2028      sodium chloride         Physical Exam:  /72   Pulse 80   Temp 98.9 °F (37.2 °C) (Oral)   Resp 18   Ht 6' 3\" (1.905 m)   Wt 194 lb 11.2 oz (88.3 kg)   SpO2 95%   BMI 24.34 kg/m²   Weight change: Wt Readings from Last 3 Encounters:   05/17/21 194 lb 11.2 oz (88.3 kg)   05/13/21 201 lb (91.2 kg)   05/04/21 201 lb (91.2 kg)     The patient is awake, alert and in no discomfort or distress. No gross musculoskeletal deformity is present. No significant skin or nail changes are present. Gross examination of head, eyes, nose and throat are negative. Jugular venous pressure is normal and no carotid bruits are present. Normal respiratory effort is noted with no accessory muscle usage present. Lung fields are clear to ascultation. Cardiac examination is notable for an irregular  rhythm with no palpable thrill. Distant cardiac tones persist with no gallop rhythm or cardiac murmur are identified. A benign abdominal examination is present with no masses or organomegaly. Intact pulses are present throughout all extremities and no peripheral edema is present. No focal neurologic deficits are present. Intake/Output:    Intake/Output Summary (Last 24 hours) at 5/18/2021 0825  Last data filed at 5/17/2021 1512  Gross per 24 hour   Intake 360 ml   Output --   Net 360 ml     No intake/output data recorded. Laboratory Tests:  Lab Results   Component Value Date    CREATININE 0.6 (L) 05/16/2021    BUN 13 05/16/2021     05/16/2021    K 4.5 05/16/2021     05/16/2021    CO2 31 (H) 05/16/2021     No results for input(s): CKTOTAL, CKMB in the last 72 hours.     Invalid input(s): TROPONONI  No results found for: BNP  Lab Results   Component Value Date    WBC 5.2 05/13/2021    RBC 3.80 05/13/2021    HGB 13.5 05/13/2021    HCT 42.1 05/13/2021    .8 05/13/2021    MCH 35.5 05/13/2021    MCHC 32.1 05/13/2021    RDW 15.2 05/13/2021     05/13/2021    MPV 10.5 05/13/2021     No results for input(s): ALKPHOS, ALT, AST, PROT, BILITOT, BILIDIR, LABALBU in the last 72 hours. No results found for: MG  Lab Results   Component Value Date    PROTIME 14.9 05/14/2021    INR 1.3 05/14/2021     Lab Results   Component Value Date    TSH 1.850 05/13/2021     No components found for: CHLPL  Lab Results   Component Value Date    TRIG 39 12/23/2020    TRIG 48 05/20/2020    TRIG 81 01/20/2020     Lab Results   Component Value Date    HDL 63 12/23/2020    HDL 56 05/20/2020    HDL 30 01/20/2020     Lab Results   Component Value Date    LDLCALC 100 (H) 12/23/2020 [address] 104 05/20/2020 [address] [employer] 84 01/20/2020       Cardiac Tests:  Telemetry findings reviewed: atrial fibrillation with a present mean ventricular response of approximately 100 bpm, no new tachy/bradyarrhythmias overnight      ASSESSMENT / PLAN: On a clinical basis, the patient presently appears further compensated in terms of adequate rate control of his atrial fibrillation with further dose modification of his beta-blocker limited by relative blood pressures, especially in view of his identified severe low gradient aortic stenosis and concerns with modification of his digitalis dosage, especially in light of his age and in spite of previously documented creatinine clearance is with repeat assessment of renal function and electrolytes presently pending. An additional discussion was held regarding his valvular status with some concerns regarding his ability to fully comprehend his present situation related to intervention but at the present time, he remains adamantly opposed to the consideration of transcatheter aortic valve intervention. As previously outlined in light of his present situation, the involvement of palliative care with he and his family would be advisable to reassess advanced directives to further assist in an additional management. Additional management will presently be deferred to primary care. Independent of valvular intervention, his prognosis remains guarded. Note: This report was completed utilizing computer voice recognition software. Every effort has been made to ensure accuracy, however; inadvertent computerized transcription errors may be present. Xi Bustamante.  Carlos Gomez, [address] [facility]

## 2021-05-18 NOTE — PROGRESS NOTES
[facility] Progress Note    Admitting Date and Time: 5/13/2021 12:41 PM  Admit Dx: Atrial fibrillation with RVR ([address] [I48.91]    Subjective:  Patient is being followed for Atrial fibrillation with RVR ([address] [I48.91]   Pt feels fine, but looked more fatigued and tired today, he is agreeable ot go tot rehab today though. ROS: denies fever, chills, cp, sob, n/v, HA unless stated above.       furosemide  40 mg Intravenous BID    metoprolol succinate  75 mg Oral BID    apixaban  5 mg Oral BID    atorvastatin  40 mg Oral Nightly    digoxin  125 mcg Oral Daily    miconazole nitrate   Topical BID    folic acid  1 mg Oral Daily    levothyroxine  25 mcg Oral Daily    predniSONE  2.5 mg Oral Daily    sodium chloride flush  5-40 mL Intravenous 2 times per day    Arformoterol Tartrate  15 mcg Nebulization BID    And    budesonide  0.5 mg Nebulization BID     sodium chloride flush, 5-40 mL, PRN  sodium chloride, 25 mL, PRN  promethazine, 12.5 mg, Q6H PRN   Or  ondansetron, 4 mg, Q6H PRN  polyethylene glycol, 17 g, Daily PRN  acetaminophen, 650 mg, Q6H PRN   Or  acetaminophen, 650 mg, Q6H PRN  perflutren lipid microspheres, 1.5 mL, ONCE PRN         Objective:    /72   Pulse 80   Temp 98.9 °F (37.2 °C) (Oral)   Resp 18   Ht 6' 3\" (1.905 m)   Wt 194 lb 11.2 oz (88.3 kg)   SpO2 95%   BMI 24.34 kg/m²     General Appearance: alert and oriented x1-2  Skin: warm and dry  Head: normocephalic and atraumatic  Eyes: pupils equal, round, and reactive to light, extraocular eye movements intact, conjunctivae normal  Neck: neck supple and non tender without mass   Pulmonary/Chest: clear to auscultation bilaterally- no wheezes, rales or rhonchi, normal air movement, no respiratory distress  Cardiovascular: IRRR, normal S1 and S2 and no carotid bruits  Abdomen: soft, non-tender, non-distended, normal bowel sounds, no masses or organomegaly  Extremities: no cyanosis, no clubbing and +2

## 2021-05-18 NOTE — PROGRESS NOTES
Occupational Therapy  OT BEDSIDE TREATMENT NOTE      Date:2021  Patient Name: Ortega Chambers  MRN: [id number]  : 1935  Room: [address] [place] [address]     Referring Provider: Megan Stephens MD     Evaluating OT: Jeremiah Arguetadash OTR/L [id number]     AM-PAC Daily Activity Raw Score:      Recommended Adaptive Equipment: TBD     Diagnosis: afib with RVR. Pertinent Medical History: CHF, asthma, COPD, HTN, DM   Precautions:  Falls     Home Living: Pt lives with wife in a single story home. Bathroom setup: walk in shower with seat, standard commode      Prior Level of Function: PLOF from pt report, pt is a questionable historian. Pt reports daily \"maid\" to assist in bathing, Mod I in toileting, grooming and dressing. Wife and \"maid\" assist in IADLs; completed functional mobility with [employer].      Pain Level: Pt reported pain in R hand      Cognition: initially poor response to therapist.  Son arrived and changed batteries in hearing aids. Pt grossly oriented to place. Having difficulty to finish his sentences. Son reports pt is \"a little more confused than his baseline\". Functional Assessment:    Initial Eval Status  Date: 21 Treatment session:  Short Term Goals      Feeding Set up Pt picks up spoon then sets it back down several times. Did  and drink from cup using B hands to bring to mouth.   Pt refusing to eat anymore of his lunch.       Grooming SBA  Standing sink level for hand hygiene     Max A     Mod I   UB Dressing Min A  Management of hospital gown   Mod I   LB Dressing Max A  Donning B socks seated EOB Max A   Min A    Bathing Mod A   Min A   Toileting Min A  Use of grab bar for support in transfer  Assist minimally in posterior corina care   Mod I   Bed Mobility  Supine to sit: Min A       Functional Transfers STS: CGA   Mod I   Functional Mobility CGA with [employer]  Household distance   Mod I during ADLs   Balance Sitting: fair plus     Standing: fair at [employer]       Activity Tolerance [x]??Balance retraining/tolerance tasks for facilitation of postural control with dynamic challenges during ADLs  []? ?Neuromuscular re-education to facilitate righting/equilibrium reactions, midline orientation, scapular stability/mobility, normalize muscle tone and facilitate active functional movement                        []? ? Delirium prevention/treatment    [x]? Positioning to improve functional independence and decrease risk of skin breakdown  []? ?Other:        Time In: 11:32  Time Out: 11:47     Min Units   Therapeutic Ex 77981     Therapeutic Activities 01853     ADL/Self Care 94864 15 1   Orthotic Management 40773     Neuro Re-Ed 52497     Non-Billable Time     TOTAL TIMED TREATMENT 15 [address] [facility] JESUS/L [address]

## 2021-05-19 NOTE — PLAN OF CARE
Problem: Falls - Risk of:  Goal: Will remain free from falls  Description: Will remain free from falls  5/19/2021 0151 by Patricia Subramanian RN  Outcome: Met This Shift     Problem: Falls - Risk of:  Goal: Absence of physical injury  Description: Absence of physical injury  5/19/2021 0151 by Patricia Subramanian RN  Outcome: Met This Shift     Problem: Pain:  Goal: Pain level will decrease  Description: Pain level will decrease  5/19/2021 0151 by Patricia Subramanian RN  Outcome: Met This Shift     Problem: Pain:  Goal: Control of acute pain  Description: Control of acute pain  5/19/2021 0151 by Patricia Subramanian RN  Outcome: Met This Shift     Problem: Pain:  Goal: Control of chronic pain  Description: Control of chronic pain  5/19/2021 0151 by Patricia Subramanian RN  Outcome: Met This Shift     Problem: FLUID AND ELECTROLYTE IMBALANCE  Goal: Fluid and electrolyte balance are achieved/maintained  Outcome: Met This Shift     Problem: HH FLUID RETENTION-CHF  Goal: Absence of fluid overload signs and symptoms  Outcome: Met This Shift     Problem: Skin Integrity:  Goal: Will show no infection signs and symptoms  Description: Will show no infection signs and symptoms  5/19/2021 0151 by Patricia Subramanian RN  Outcome: Met This Shift     Problem: Skin Integrity:  Goal: Absence of new skin breakdown  Description: Absence of new skin breakdown  5/19/2021 0151 by Patricia Subramanian RN  Outcome: Met This Shift     Problem: Musculor/Skeletal Functional Status  Goal: Absence of falls  5/19/2021 0151 by Patricia Subramanian RN  Outcome: Met This Shift

## 2021-05-19 NOTE — PROGRESS NOTES
[facility] Progress Note    Admitting Date and Time: 5/13/2021 12:41 PM  Admit Dx: Atrial fibrillation with RVR ([facility] [I48.91]    Subjective:  Patient is being followed for Atrial fibrillation with RVR ([facility] [I48.91]   Pt is hypotensive this morning. More oriented and alert    ROS: denies fever, chills, cp, sob, n/v, HA unless stated above.       metoprolol succinate  75 mg Oral BID    apixaban  5 mg Oral BID    atorvastatin  40 mg Oral Nightly    digoxin  125 mcg Oral Daily    miconazole nitrate   Topical BID    folic acid  1 mg Oral Daily    levothyroxine  25 mcg Oral Daily    predniSONE  2.5 mg Oral Daily    sodium chloride flush  5-40 mL Intravenous 2 times per day    Arformoterol Tartrate  15 mcg Nebulization BID    And    budesonide  0.5 mg Nebulization BID     sodium chloride flush, 5-40 mL, PRN  sodium chloride, 25 mL, PRN  promethazine, 12.5 mg, Q6H PRN   Or  ondansetron, 4 mg, Q6H PRN  polyethylene glycol, 17 g, Daily PRN  acetaminophen, 650 mg, Q6H PRN   Or  acetaminophen, 650 mg, Q6H PRN  perflutren lipid microspheres, 1.5 mL, ONCE PRN         Objective:    BP (!) 92/54   Pulse 105   Temp 98.3 °F (36.8 °C) (Oral)   Resp 18   Ht 6' 3\" (1.905 m)   Wt 194 lb 14.2 oz (88.4 kg)   SpO2 94%   BMI 24.36 kg/m²     General Appearance: alert and oriented x2  Skin: warm and dry  Head: normocephalic and atraumatic  Eyes: pupils equal, round, and reactive to light, extraocular eye movements intact, conjunctivae normal  Neck: neck supple and non tender without mass   Pulmonary/Chest: clear to auscultation bilaterally- no wheezes, rales or rhonchi, normal air movement, no respiratory distress  Cardiovascular: IRRR, normal S1 and S2 and no carotid bruits  Abdomen: soft, non-tender, non-distended, normal bowel sounds, no masses or organomegaly  Extremities: no cyanosis, no clubbing and +2 edema  Neurologic: no cranial nerve deficit and speech normal        Recent Labs 05/16/21  1245 05/18/21  1245    131*   K 4.5 5.5*    94*   CO2 31* 28   BUN 13 20   CREATININE 0.6* 0.7   GLUCOSE 105* 192*   CALCIUM 9.3 9.3       No results for input(s): WBC, RBC, HGB, HCT, MCV, MCH, MCHC, RDW, PLT, MPV in the last 72 hours. Assessment:    Active Problems: Aortic valve stenosis    Atrial fibrillation with RVR (HCC)    Acute on chronic diastolic congestive heart failure (HCC)  Resolved Problems:    * No resolved hospital problems. *      Plan:  1. Afib with RVR, not sure about the trigger, unable to be on cradizem drip due to hypotensive, patient was started on digoxin, curently at 125 mcg daily, and metoprolol increase to 75 mg bid , appreciate cardiology input, started lovenox for AC, switched to eliquis,  2. Acute decompensation of diastolic heart failure, with bilateral lower ext edema, stop lasix for hypotension, CXR some  atelectasis, discussed with cardiology. 3.  Severe aortic stenosis, patient refusing TAVR. Stop diuretics  4. Hypothyroidism, continue synthroid. 5.  Hx of COPD, not in exacerbation, continue bronchodilator   6. Dispo, obtain pt ot scored 17/24, wife can't take care of him at home, plans for SNF  7. Fatigue with urine incontinence, negative urine analysis and culture, place  monitor urine output. Improved, pending procal and CBC        NOTE: This report was transcribed using voice recognition software. Every effort was made to ensure accuracy; however, inadvertent computerized transcription errors may be present.   Electronically signed by Kye Morocho MD on 5/19/2021 at 8:07 AM

## 2021-05-19 NOTE — PROGRESS NOTES
Occupational Therapy  Date:5/19/2021  Patient Name: Jayme Moncada  Room: 8859/2958-T     Occupational Therapy (OT) treatment attempted this afternoon; OT treatment held, per nursing, secondary to increased heart rate. Continue OT POC, as appropriate/able. Pam Mckeon, OTR/L  License Number: [id number]

## 2021-05-19 NOTE — PROGRESS NOTES
INPATIENT CARDIOLOGY FOLLOW-UP    Name: Lore Cueva    Age: 80 y.o. Date of Admission: 5/13/2021 12:41 PM    Date of Service: 5/19/2021    Chief Complaint: Follow-up for acute superimposed upon chronic diastolic heart failure, paroxysmal atrial fibrillation, aortic valve disease, chronic obstructive lung disease, hypertension    Interim History: The patient is presently relatively lethargic with evidence of relative hypotension presently noted. Persistent suboptimal rate control of his atrial fibrillation is noted with management limitations as outlined above, particular in the face of his aortic valve disease. He most recent chest x-ray demonstrates chronic interstitial changes without significant interstitial edema and with evidence either of a small right pleural effusion and/or volume loss of the right lower lobe. A mild prerenal azotemia is noted following recent diuresis. Review of Systems: The remainder of a complete multisystem review including consitutional, central nervous, respiratory, circulatory, gastrointestinal, genitourinary, endocrinologic, hematologic, musculoskeletal and psychiatric are negative.     Problem List:  Patient Active Problem List   Diagnosis    Acquired varus deformity knee    COPD without exacerbation ([address]    Mixed hyperlipidemia    Chronic osteomyelitis of left femur ([address]    Degenerative joint disease of left knee    Acquired hypothyroidism    Primary osteoarthritis involving multiple joints    Anxiety    Vitamin D deficiency    Pulmonary emphysema ([address]    H/O calcium pyrophosphate deposition disease (CPPD)    Unexplained weight loss    Mild intermittent asthma without complication    Elevated liver function tests    Hyponatremia    Unable to walk    Generalized weakness    Low vitamin B12 level    Recurrent falls    Essential hypertension    Aortic valve stenosis    Atrial fibrillation with RVR (HCC)    Acute on chronic diastolic congestive heart failure (HCC)       Allergies:   Allergies   Allergen Reactions    Cephalexin     Sulfa Antibiotics        Current Medications:  Current Facility-Administered Medications   Medication Dose Route Frequency Provider Last Rate Last Admin    metoprolol succinate (TOPROL XL) extended release tablet 75 mg  75 mg Oral BID Dante Fish MD   75 mg at 05/18/21 0739    apixaban (ELIQUIS) tablet 5 mg  5 mg Oral BID Louis Muñoz MD   5 mg at 05/18/21 2109    atorvastatin (LIPITOR) tablet 40 mg  40 mg Oral Nightly Elsy Pantelis, APRN - CNP   40 mg at 05/17/21 2238    digoxin (LANOXIN) tablet 125 mcg  125 mcg Oral Daily Elsy Pantelis, APRN - CNP   125 mcg at 05/18/21 0739    miconazole nitrate 2 % ointment   Topical BID Gideon Mitchell MD   Given at 11/98/16 0475    folic acid (FOLVITE) tablet 1 mg  1 mg Oral Daily Checo Worley MD   1 mg at 05/18/21 0844    levothyroxine (SYNTHROID) tablet 25 mcg  25 mcg Oral Daily Checo Worley MD   25 mcg at 05/19/21 0600    predniSONE (DELTASONE) tablet 2.5 mg  2.5 mg Oral Daily Checo Worley MD   2.5 mg at 05/18/21 0844    sodium chloride flush 0.9 % injection 5-40 mL  5-40 mL Intravenous 2 times per day Checo Worley MD   10 mL at 05/19/21 0022    sodium chloride flush 0.9 % injection 5-40 mL  5-40 mL Intravenous PRN Checo Worley MD   10 mL at 05/18/21 1858    0.9 % sodium chloride infusion  25 mL Intravenous PRN Checo Worley MD        promethazine (PHENERGAN) tablet 12.5 mg  12.5 mg Oral Q6H PRN Checo Worley MD        Or    ondansetron (ZOFRAN) injection 4 mg  4 mg Intravenous Q6H PRN Checo Worley MD        polyethylene glycol (GLYCOLAX) packet 17 g  17 g Oral Daily PRN Checo Worley MD        acetaminophen (TYLENOL) tablet 650 mg  650 mg Oral Q6H PRN Checo Worley MD        Or    acetaminophen (TYLENOL) suppository 650 mg  650 mg Rectal Q6H PRN MD Miguel Maguire perflutren lipid microspheres (DEFINITY) injection 1.65 mg  1.5 mL Intravenous ONCE PRN Onelia Elliott MD        Arformoterol Tartrate (BROVANA) nebulizer solution 15 mcg  15 mcg Nebulization BID Onelia Elliott MD   15 mcg at 05/18/21 1930    And    budesonide (PULMICORT) nebulizer suspension 500 mcg  0.5 mg Nebulization BID Onelia Elliott MD   500 mcg at 05/18/21 1930      sodium chloride         Physical Exam:  BP (!) 92/54   Pulse 105   Temp 98.3 °F (36.8 °C) (Oral)   Resp 18   Ht 6' 3\" (1.905 m)   Wt 194 lb 14.2 oz (88.4 kg)   SpO2 94%   BMI 24.36 kg/m²   Weight change: Wt Readings from Last 3 Encounters:   05/19/21 194 lb 14.2 oz (88.4 kg)   05/13/21 201 lb (91.2 kg)   05/04/21 201 lb (91.2 kg)     The patient is lethargic and in no discomfort or distress. No gross musculoskeletal deformity is present. No significant skin or nail changes are present. Gross examination of head, eyes, nose and throat are negative. Jugular venous pressure is normal and no carotid bruits are present. Normal respiratory effort is noted with no accessory muscle usage present. Lung fields are clear to ascultation. Cardiac examination is notable for an irregular rhythm with no palpable thrill. No gallop rhythm or cardiac murmur are identified. A benign abdominal examination is present with no masses or organomegaly. Intact pulses are present throughout all extremities and no peripheral edema is present. No focal neurologic deficits are present. Intake/Output:    Intake/Output Summary (Last 24 hours) at 5/19/2021 0808  Last data filed at 5/18/2021 1708  Gross per 24 hour   Intake 760 ml   Output --   Net 760 ml     No intake/output data recorded.     Laboratory Tests:  Lab Results   Component Value Date    CREATININE 0.7 05/18/2021    BUN 20 05/18/2021     (L) 05/18/2021    K 5.5 (H) 05/18/2021    CL 94 (L) 05/18/2021    CO2 28 05/18/2021     No results for input(s): CKTOTAL, CKMB in the last 72 hours.    Invalid input(s): TROPONONI  No results found for: BNP  Lab Results   Component Value Date    WBC 5.2 05/13/2021    RBC 3.80 05/13/2021    HGB 13.5 05/13/2021    HCT 42.1 05/13/2021    .8 05/13/2021    MCH 35.5 05/13/2021    MCHC 32.1 05/13/2021    RDW 15.2 05/13/2021     05/13/2021    MPV 10.5 05/13/2021     No results for input(s): ALKPHOS, ALT, AST, PROT, BILITOT, BILIDIR, LABALBU in the last 72 hours. No results found for: MG  Lab Results   Component Value Date    PROTIME 14.9 05/14/2021    INR 1.3 05/14/2021     Lab Results   Component Value Date    TSH 1.850 05/13/2021     No components found for: CHLPL  Lab Results   Component Value Date    TRIG 39 12/23/2020    TRIG 48 05/20/2020    TRIG 81 01/20/2020     Lab Results   Component Value Date    HDL 63 12/23/2020    HDL 56 05/20/2020    HDL 30 01/20/2020     Lab Results   Component Value Date    LDLCALC 100 (H) 12/23/2020 1811 [place] 104 05/20/2020 1811 [place] 84 01/20/2020       Cardiac Tests:  Telemetry findings reviewed: atrial fibrillation with a mean ventricular response of approximately 100 bpm, no new tachy/bradyarrhythmias overnight  Chest X-ray: A repeat chest x-ray reviewed at the time of evaluation demonstrates no evidence of cardiomegaly with chronic interstitial changes and either that of a small right pleural effusion or right lower lobe volume loss      ASSESSMENT / PLAN: On a clinical basis, the patient presently appears compensated in terms of his volume status with no radiographic evidence of interstitial infiltrates in spite of his somewhat progressively increased proBNP level and with laboratory assessment of prerenal azotemia somewhat progressive with present diuretics.   Marginal rate control of his atrial fibrillation persists with present blood pressures precluding further modification of his beta-blocker dosage and concerns related to digitalis toxicity, particularly at the age and in spite of his creatinine clearance increasing risk of complications with further modification of his digitalis dosage. His condition has been extensively discussed with primary care in light of the absence of consent to proceed with aortic valve intervention, no additional alteration of cardiovascular management appears advisable at this time. Continue careful monitoring of his volume status as well as that of renal function and electrolytes will remain essential as well as that of ongoing nutrition to assist fluid mobilization reduce risk of progressive debilitation. As previously outlined, in light of the absence of correction of his presently limiting cardiovascular factors, his prognosis remains guarded with consideration of palliative care consultation to reassess goals of advanced directives. The duration of discussion counseling including with that of primary care in regards to present management exceeded 35 minutes      Note: This report was completed utilizing computer voice recognition software. Every effort has been made to ensure accuracy, however; inadvertent computerized transcription errors may be present. Armin Sheikh.  Celi Avalos, [facility]

## 2021-05-19 NOTE — PROGRESS NOTES
BP @2045 was 80/0 (dopplar)    Dr Mitchell Gates notified recheck BP in 30 minutes. BP was then 76/0 (dopplar)    Dr Mitchell Gates notified and orders given 250ml NS bolus   BP rechecked at 2300 87/64 Dr Mitchell Gates notified, order given to hold metoprolol.

## 2021-05-20 NOTE — CARE COORDINATION
Social Work/Discharge Planning:  Stacey Medina with [facility] confirmed patient pre-cert is good for today only. Will continue to follow.  Electronically signed by Azalia Spatz, LSW on 5/20/2021 at 7:55 AM

## 2021-05-20 NOTE — PROGRESS NOTES
Spoke with patient's wife and updated that pt may be discharged today.  arranged for 6pm to [facility]. If anything changes, I informed wife I will call back to update.

## 2021-05-20 NOTE — PROGRESS NOTES
5/20/2021  4:49 PM      Nutrition Assessment     Type and Reason for Visit: RD Nutrition Re-Screen/LOS    Nutrition Recommendations/Plan: Continue current Cardiac Diet and recommend  PO ONS if intake remains suboptimal at [facility]    Nutrition Assessment:  Discharge plan for ECF later today. Pt had not been eating well. A&Ox2 likely contributing to inadequate oral intake. Pt may benefit from ONS at [facility], if PO remains inadequate    Nutrition Related Findings: +1 edema, +BS, +I/O 3L, A&Ox2,      Current Nutrition Therapies:    DIET CARDIAC;     Anthropometric Measures:  · Height: 6' 3\" (190.5 cm)  · Current Body Wt: 189 lb (85.7 kg) (5/20 bedscale)   · BMI: 23.6    Nutrition Diagnosis:   No nutrition diagnosis at this time     Nutrition Interventions:   Food and/or Nutrient Delivery:  Continue Current Diet  Nutrition Education/Counseling:  Education not indicated   Coordination of Nutrition Care:  Continue to monitor while inpatient    Goals:  PO >50% at meals       Nutrition Monitoring and Evaluation:   Behavioral-Environmental Outcomes:  None Identified   Food/Nutrient Intake Outcomes:  Food and Nutrient Intake  Physical Signs/Symptoms Outcomes:  None Identified     Discharge Planning:    No discharge needs at this time     Electronically signed by Ramandeep Reeves RD, CNSC, LD on 5/20/21 at 4:49 PM EDT    Contact: [phone]

## 2021-05-20 NOTE — DISCHARGE SUMMARY
[facility] EMERGENCY SERVICE Physician Discharge Summary       [facility] at [address] [address]  [address], [address] [place] [address]  [phone]                Cardiology if needed    Cardiology as suggested     Activity level: As tolerated    Diet: DIET CARDIAC; Dispo:to SNF     Condition on Discharge - stable     Patient ID:  Bernard Berger  [id number]  58 y.o.  1935    Admit date: 5/13/2021    Discharge date and time:  5/20/2021  3:42 PM    Admission Diagnoses: Active Problems: Aortic valve stenosis    Atrial fibrillation with RVR (HCC)    Acute on chronic diastolic congestive heart failure (HCC)  Resolved Problems:    * No resolved hospital problems. *      Discharge Diagnoses: Active Problems: Aortic valve stenosis    Atrial fibrillation with RVR (HCC)    Acute on chronic diastolic congestive heart failure (HCC)  Resolved Problems:    * No resolved hospital problems. *      Consults:  IP CONSULT TO CARDIOLOGY  IP CONSULT TO IV TEAM  IP CONSULT TO IV TEAM  IP CONSULT TO IV TEAM  IP CONSULT TO IV TEAM  IP CONSULT TO IV TEAM    Hospital Course:   He is a 27-year-old male with past medical history of COPD, atrial fibrillation, hypertension, hyperlipidemia, hypothyroidism, type 2 diabetes came to ER with newly diagnosed atrial fibrillation which was noted in his PCPs office. He was started on IV Cardizem, and admitted on progressive unit. Cardiology was consulted and following during hospital stay. IV Cardizem was discontinued because of hypotension. He was started on digoxin and metoprolol dose was increased. He was monitored on telemetry bed. He was started on subcu Lovenox which was switched to Eliquis. Cardiology was adjusting the dose. Later digoxin was increased and metoprolol was decreased. He is getting discharge on following medications as per cardiology.   Dig dose was .0*   MCH 35.6*   MCHC 33.6   RDW 14.5      MPV 10.4       No results for input(s): POCGLU in the last 72 hours. Imaging:   XR CHEST (2 VW)   Final Result   Right basilar opacity, which may represent atelectasis, pneumonia, effusion   or a combination thereof. CT HEAD WO CONTRAST   Final Result   No acute intracranial abnormality. US DUP LOWER EXTREMITY RIGHT REESE   Final Result   No evidence of DVT in the right lower extremity. XR CHEST PORTABLE   Final Result   1. Cardiomegaly. There is no evidence of failure or pneumonia. Patient Instructions:      Medication List      START taking these medications    apixaban 5 MG Tabs tablet  Commonly known as: ELIQUIS  Take 1 tablet by mouth 2 times daily     atorvastatin 40 MG tablet  Commonly known as: LIPITOR  Take 1 tablet by mouth nightly     * digoxin 250 MCG tablet  Commonly known as: LANOXIN  Take 1 tablet by mouth every other day  Start taking on: May 21, 2021     * digoxin 125 MCG tablet  Commonly known as: LANOXIN  Take 1 tablet by mouth every other day  Start taking on: May 22, 2021     furosemide 20 MG tablet  Commonly known as: Lasix  Take 1 tablet by mouth daily     metoprolol succinate 25 MG extended release tablet  Commonly known as: TOPROL XL  Take 1 tablet by mouth 2 times daily         * This list has 2 medication(s) that are the same as other medications prescribed for you. Read the directions carefully, and ask your doctor or other care provider to review them with you.             CONTINUE taking these medications    aspirin EC 81 MG EC tablet  Take 1 tablet by mouth daily for 25 days     escitalopram 10 MG tablet  Commonly known as: LEXAPRO  Take 1 tablet by mouth daily     fluticasone-salmeterol 500-50 MCG/DOSE diskus inhaler  Commonly known as: Advair Diskus  Inhale 1 puff into the lungs every 12 hours     folic acid 1 MG tablet  Commonly known as: FOLVITE  Take 1 tablet by mouth daily computerized transcription errors may be present.

## 2021-05-20 NOTE — PROGRESS NOTES
INPATIENT CARDIOLOGY FOLLOW-UP    Name: Jacques Perea    Age: 80 y.o. Date of Admission: 5/13/2021 12:41 PM    Date of Service: 5/20/2021    Chief Complaint: Follow-up for acute superimposed upon chronic diastolic heart failure, paroxysmal atrial fibrillation, aortic valve disease, chronic obstructive lung disease, hypertension    Interim History: The patient presently remains compensated from a cardiovascular standpoint with no clinical volume overload and persistent marginal rate control of his atrial fibrillation. Review of Systems: The remainder of a complete multisystem review including consitutional, central nervous, respiratory, circulatory, gastrointestinal, genitourinary, endocrinologic, hematologic, musculoskeletal and psychiatric are negative. Problem List:  Patient Active Problem List   Diagnosis    Acquired varus deformity knee    COPD without exacerbation ([address]    Mixed hyperlipidemia    Chronic osteomyelitis of left femur [facility]    Degenerative joint disease of left knee    Acquired hypothyroidism    Primary osteoarthritis involving multiple joints    Anxiety    Vitamin D deficiency    Pulmonary emphysema ([facility]    H/O calcium pyrophosphate deposition disease (CPPD)    Unexplained weight loss    Mild intermittent asthma without complication    Elevated liver function tests    Hyponatremia    Unable to walk    Generalized weakness    Low vitamin B12 level    Recurrent falls    Essential hypertension    Aortic valve stenosis    Atrial fibrillation with RVR (HCC)    Acute on chronic diastolic congestive heart failure (HCC)       Allergies:   Allergies   Allergen Reactions    Cephalexin     Sulfa Antibiotics        Current Medications:  Current Facility-Administered Medications   Medication Dose Route Frequency Provider Last Rate Last Admin    metoprolol succinate (TOPROL XL) extended release tablet 25 mg  25 mg Oral BID Evgeny Freedman MD   25 mg at 05/19/21 2030    apixaban (ELIQUIS) tablet 5 mg  5 mg Oral BID Senait Haley MD   5 mg at 05/19/21 2031    atorvastatin (LIPITOR) tablet 40 mg  40 mg Oral Nightly Elsy Pantelis, APRN - CNP   40 mg at 05/19/21 2030    digoxin (LANOXIN) tablet 125 mcg  125 mcg Oral Daily Elsy Pantelis, APRN - CNP   125 mcg at 05/19/21 0905    miconazole nitrate 2 % ointment   Topical BID Vera Romero MD   Given at 69/91/21 7092    folic acid (FOLVITE) tablet 1 mg  1 mg Oral Daily Gissel Vergara MD   1 mg at 05/19/21 9577    levothyroxine (SYNTHROID) tablet 25 mcg  25 mcg Oral Daily Gissel Vergara MD   25 mcg at 05/20/21 0553    predniSONE (DELTASONE) tablet 2.5 mg  2.5 mg Oral Daily Gissel Vergara MD   2.5 mg at 05/19/21 0721    sodium chloride flush 0.9 % injection 5-40 mL  5-40 mL Intravenous 2 times per day Gissel Vergara MD   10 mL at 05/19/21 2031    sodium chloride flush 0.9 % injection 5-40 mL  5-40 mL Intravenous PRN Gissel Vergara MD   10 mL at 05/19/21 1902    0.9 % sodium chloride infusion  25 mL Intravenous PRN Gissel Vergara MD        promethazine (PHENERGAN) tablet 12.5 mg  12.5 mg Oral Q6H PRN Gissel Vergara MD        Or    ondansetron (ZOFRAN) injection 4 mg  4 mg Intravenous Q6H PRN Gissel Vergara MD        polyethylene glycol (GLYCOLAX) packet 17 g  17 g Oral Daily PRN Gissel Vergara MD        acetaminophen (TYLENOL) tablet 650 mg  650 mg Oral Q6H PRN Gissel Vergara MD        Or    acetaminophen (TYLENOL) suppository 650 mg  650 mg Rectal Q6H PRN Gissel Vergara MD        perflutren lipid microspheres (DEFINITY) injection 1.65 mg  1.5 mL Intravenous ONCE PRN Gissel Vergara MD        Arformoterol Tartrate (BROVANA) nebulizer solution 15 mcg  15 mcg Nebulization BID Gissel Vergara MD   15 mcg at 05/19/21 2118    And    budesonide (PULMICORT) nebulizer suspension 500 mcg  0.5 mg Nebulization BID Gissel Vergara MD   500 mcg at 05/19/21 05/19/2021    MPV 10.4 05/19/2021     No results for input(s): ALKPHOS, ALT, AST, PROT, BILITOT, BILIDIR, LABALBU in the last 72 hours. No results found for: MG  Lab Results   Component Value Date    PROTIME 14.9 05/14/2021    INR 1.3 05/14/2021     Lab Results   Component Value Date    TSH 1.850 05/13/2021     No components found for: CHLPL  Lab Results   Component Value Date    TRIG 39 12/23/2020    TRIG 48 05/20/2020    TRIG 81 01/20/2020     Lab Results   Component Value Date    HDL 63 12/23/2020    HDL 56 05/20/2020    HDL 30 01/20/2020     Lab Results   Component Value Date    LDLCALC 100 (H) 12/23/2020 [address] 104 05/20/2020 [address] [employer] [address] 01/20/2020       Cardiac Tests:  Telemetry findings reviewed: atrial fibrillation with a persistent mean rate of approximately 100-110 bpm, no new tachy/bradyarrhythmias overnight      ASSESSMENT / PLAN: On a clinical basis, the patient remains symptomatically compensated from a cardiovascular standpoint with persistent marginal rate control of atrial fibrillation and limited additional management beyond that of attempted modification of his digitalis dosage with need of careful monitoring of levels and clinical assessment for toxicity based on his severe low gradient aortic stenosis and relative hypotension limiting further modification of beta-blocker therapy. Beyond this based on his unwillingness to consider intervention of his aortic valve, additional cardiovascular recommendations are limited with needs of ongoing clinical monitoring of his volume status as well as that of renal function and electrolytes with ongoing nutrition essential to further fluid mobilization. His prognosis remains guarded with additional recommendations as previously outlined of involvement of palliative care to assist in advanced directive decisions. Note: This report was completed utilizing computer voice recognition software.  Every effort has been made to ensure accuracy, however; inadvertent computerized transcription errors may be present. Corina Vizcarra.  Joel Jackson, [facility] [facility]

## 2021-05-20 NOTE — PROGRESS NOTES
Dr. Anthony Number called regarding digoxin and toprol on discharge and wants nursing to check with cardiology on dose for discharge. Message sent to [facility], NP regarding this.

## 2021-05-20 NOTE — PLAN OF CARE
Problem: Falls - Risk of:  Goal: Will remain free from falls  Description: Will remain free from falls  Outcome: Met This Shift     Problem: Falls - Risk of:  Goal: Absence of physical injury  Description: Absence of physical injury  Outcome: Met This Shift     Problem: Pain:  Goal: Pain level will decrease  Description: Pain level will decrease  Outcome: Met This Shift     Problem: Pain:  Goal: Control of acute pain  Description: Control of acute pain  Outcome: Met This Shift     Problem: Pain:  Goal: Control of chronic pain  Description: Control of chronic pain  Outcome: Met This Shift     Problem: Skin Integrity:  Goal: Will show no infection signs and symptoms  Description: Will show no infection signs and symptoms  Outcome: Met This Shift     Problem: Skin Integrity:  Goal: Absence of new skin breakdown  Description: Absence of new skin breakdown  Outcome: Met This Shift     Problem: Musculor/Skeletal Functional Status  Goal: Absence of falls  Outcome: Met This Shift

## 2021-05-20 NOTE — CARE COORDINATION
CASE MANAGEMENT. Kathryn Morrow Arrangements made for [facility] to transport patient to [facility] at 6pm today. Nursing, Mrs Weaver Otto and facility all notified. N 16 in epic. Forms in chart.

## 2021-05-20 NOTE — PROGRESS NOTES
Occupational Therapy  OT BEDSIDE TREATMENT NOTE      Date:2021  Patient Name: Reece Mendoza  MRN: [id number]  : 1935  Room: [address] [place] [address]     Referring Provider: Charles Campbell MD     Evaluating OT: Mookie Chen OTR/L [id number]     AM-PAC Daily Activity Raw Score:      Recommended Adaptive Equipment: TBD     Diagnosis: afib with RVR. Pertinent Medical History: CHF, asthma, COPD, HTN, DM   Precautions:  Falls     Home Living: Pt lives with wife in a single story home. Bathroom setup: walk in shower with seat, standard commode      Prior Level of Function: PLOF from pt report, pt is a questionable historian. Pt reports daily \"maid\" to assist in bathing, Mod I in toileting, grooming and dressing. Wife and \"maid\" assist in IADLs; completed functional mobility with [employer].      Pain Level: marked pain in R UE with any touch/movement.      Cognition: Pt awake. Slow to respond. States his hearing aids are working fine however at times does not follow directions or answer questions. Appears confused and gets somewhat agitated with movement this PM.                 Functional Assessment:    Initial Eval Status  Date: 21 Treatment session:  Short Term Goals      Feeding Set up      Grooming SBA  Standing sink level for hand hygiene     Max A     Mod I   UB Dressing Min A  Management of hospital gown Mod A to change gown. Poor use of R UE during ADL.   Mod I   LB Dressing Max A  Donning B socks seated EOB Dependent   Min A    Bathing Mod A   Min A   Toileting Min A  Use of grab bar for support in transfer  Assist minimally in posterior corina care Pt found saturated in urine. Nursing called and assisted to help with hygiene.   Mod I   Bed Mobility  Supine to sit: Min A Max  A rolling side to side. Dependent supine to sit onto the side of the bed. Pt resistive to movement and continually placing LE's back into the bed. Increased confusion and resistance. Unsafe to sit in chair at this time. Returned to bed and positioned.       Functional Transfers STS: CGA   Mod I   Functional Mobility CGA with [employer]  Household distance   Mod I during ADLs   Balance Sitting: fair plus     Standing: fair at [employer] Poor sit balance.       Activity Tolerance Fair minus Poor  standing bev x7-8 min with fair plus balance during self care tasks                  Comments:  Pt laying in the bed. Agreeable to sit in the chair so that urine soaked pads could be changed. Pt resistive to movement. Agitated and pushing against staff members. Assisted back into the bed. Pt defensive to touch and slight movement of R UE. Pt indicating marked pain with gentle movements to complete UB dressing. Does not use for support during bed mobility. Nursing present and aware. Education/treatment:  ADL retraining with facilitation of movement to increase self care skills. Therapeutic activity to address endurance for ADL. Pt education of daily orientation. · Pt has made decline of  progress towards set goals. Plan of Care: 2-5 days/week for 1-2 weeks PRN   [x]? ?ADL retraining/adaptive techniques and AE recommendations to increase functional independence within precautions                    [x]? ? Energy conservation techniques to improve tolerance for ADL/IADLs  [x]? ? Functional transfer/mobility training/DME recommendations for increased independence, safety and fall prevention         [x]? ?Patient/family education to increase safety and functional independence during daily routine          [x]? ? Environmental modifications for safe mobility and completion of ADLs                             []? ? Cognitive retraining to improve problem solving skills & safe participation in ADLs/IADLs     []? ?Sensory re-education techniques to improve extremity awareness, maintain skin integrity and improve hand function                             []? ? Visual/Perceptual retraining to improve body awareness and safety during transfers and ADLs  []? ? Splinting/positioning needs to maintain joint/skin integrity and contracture prevention  [x]? ? Therapeutic activity to improve functional performance during ADLs                                        [x]? ? Therapeutic exercise to improve tolerance and functional strength for ADLs   [x]? ? Balance retraining/tolerance tasks for facilitation of postural control with dynamic challenges during ADLs  []? ?Neuromuscular re-education to facilitate righting/equilibrium reactions, midline orientation, scapular stability/mobility, normalize muscle tone and facilitate active functional movement                        []? ? Delirium prevention/treatment    [x]? Positioning to improve functional independence and decrease risk of skin breakdown  []? ?Other:        Time In: 1:40   Time Out: 2:04      Min Units   Therapeutic Ex 62131     Therapeutic Activities 24626 9 1   ADL/Self Care 62955 15 1   Orthotic Management 10422     Neuro Re-Ed 69839     Non-Billable Time     TOTAL TIMED TREATMENT 24 [facility] JESUS/L 32099

## 2021-05-20 NOTE — PROGRESS NOTES
Dalia Lane MD   Physician   Internal Medicine   Discharge Summary       Signed   Date of Service:  5/20/2021  3:42 PM               Signed        Expand AllCollapse All      Show:Clear all  [x]Manual[x]Template[]Copied    Added by:  Jose Ramon Koo MD    [x]Neto for details    [facility]     Progress NOTE    Discharge On Hold sec to [facility] Fever     Hospitalist Physician Discharge Summary         [facility] at [address] [address]  [address] [address]  [phone]                       Cardiology if needed     Cardiology as suggested      Activity level: As tolerated     Diet: DIET CARDIAC;     Dispo:to SNF      Condition on Discharge - stable      Patient ID:  Hall Cushing  [id number]  37 y.o.  1935     Admit date: 5/13/2021     Discharge date and time:  5/20/2021  3:42 PM     Admission Diagnoses: Active Problems: Aortic valve stenosis    Atrial fibrillation with RVR (HCC)    Acute on chronic diastolic congestive heart failure (HCC)  Resolved Problems:    * No resolved hospital problems. *        Discharge Diagnoses: Active Problems: Aortic valve stenosis    Atrial fibrillation with RVR (HCC)    Acute on chronic diastolic congestive heart failure (HCC)  Resolved Problems:    * No resolved hospital problems. *        Consults:  IP CONSULT TO CARDIOLOGY  IP CONSULT TO IV TEAM  IP CONSULT TO IV TEAM  IP CONSULT TO IV TEAM  IP CONSULT TO IV TEAM  IP CONSULT TO IV TEAM     Hospital Course:   He is a 80-year-old male with past medical history of COPD, atrial fibrillation, hypertension, hyperlipidemia, hypothyroidism, type 2 diabetes came to ER with newly diagnosed atrial fibrillation which was noted in his PCPs office. He was started on IV Cardizem, and admitted on progressive unit. Cardiology was consulted and following during hospital stay.   IV Cardizem was organomegaly  Extremities: no cyanosis, clubbing, +2 bipedal edema  Musculoskeletal: normal range of motion  Neurologic:no cranial nerve deficit,speech normal           LABS:       Recent Labs     05/18/21  1245 05/20/21  0944   * 133   K 5.5* 3.8   CL 94* 99   CO2 28 26   BUN 20 24*   CREATININE 0.7 0.6*   GLUCOSE 192* 143*   CALCIUM 9.3 8.6             Recent Labs     05/19/21  1400   WBC 8.7   RBC 4.19   HGB 14.9   HCT 44.4   .0*   MCH 35.6*   MCHC 33.6   RDW 14.5      MPV 10.4         No results for input(s): POCGLU in the last 72 hours.        Imaging:   XR CHEST (2 VW)   Final Result   Right basilar opacity, which may represent atelectasis, pneumonia, effusion   or a combination thereof.           CT HEAD WO CONTRAST   Final Result   No acute intracranial abnormality.           US DUP LOWER EXTREMITY RIGHT REESE   Final Result   No evidence of DVT in the right lower extremity.           XR CHEST PORTABLE   Final Result   1. Cardiomegaly. There is no evidence of failure or pneumonia.                 Patient Instructions:             Medication List             START taking these medications          apixaban 5 MG Tabs tablet  Commonly known as: ELIQUIS  Take 1 tablet by mouth 2 times daily      atorvastatin 40 MG tablet  Commonly known as: LIPITOR  Take 1 tablet by mouth nightly      * digoxin 250 MCG tablet  Commonly known as: LANOXIN  Take 1 tablet by mouth every other day  Start taking on: May 21, 2021      * digoxin 125 MCG tablet  Commonly known as: LANOXIN  Take 1 tablet by mouth every other day  Start taking on: May 22, 2021      furosemide 20 MG tablet  Commonly known as: Lasix  Take 1 tablet by mouth daily      metoprolol succinate 25 MG extended release tablet  Commonly known as: TOPROL XL  Take 1 tablet by mouth 2 times daily                 * This list has 2 medication(s) that are the same as other medications prescribed for you.  Read the directions carefully, and ask your doctor available    Ask your nurse or doctor about these medications  · digoxin 125 MCG tablet  · digoxin 250 MCG tablet  · furosemide 20 MG tablet  · metoprolol succinate 25 MG extended release tablet               Note that more than 30 minutes was spent in preparing discharge papers, discussing discharge with patient, medication review, etc.     Signed:  Electronically signed by Johnny Gomes MD on 5/20/2021 at 3:42 PM     NOTE: This report was transcribed using voice recognition software. Every effort was made to ensure accuracy; however, inadvertent computerized transcription errors may be present.               Routing History          Johnny Gomes MD   Physician   Internal Medicine   Discharge Summary       Signed   Date of Service:  5/20/2021  3:42 PM               Signed        Expand AllCollapse All      Show:Clear all  [x]Manual[x]Template[]Copied    Added by:  Lydia Kaiser MD    [x][facility] for details    [address] [facility] Physician Discharge Summary         [facility] at [address] [facility] [address]  [address] [place] [address], [address] [place], [place] [address] [phone]                       Cardiology if needed     Cardiology as suggested      Activity level: As tolerated     Diet: DIET CARDIAC;     Dispo:to SNF      Condition on Discharge - stable      Patient ID:  Reece Mendoza  [id number]  35 y.o.  1935     Admit date: 5/13/2021     Discharge date and time:  5/20/2021  3:42 PM     Admission Diagnoses: Active Problems: Aortic valve stenosis    Atrial fibrillation with RVR (HCC)    Acute on chronic diastolic congestive heart failure (HCC)  Resolved Problems:    * No resolved hospital problems. *        Discharge Diagnoses: Active Problems:     Aortic valve stenosis    Atrial fibrillation with RVR (HCC)    Acute on chronic diastolic congestive heart failure (HCC)  Resolved Problems:    * No resolved hospital problems. *        Consults:  IP CONSULT TO CARDIOLOGY  IP CONSULT TO IV TEAM  IP CONSULT TO IV TEAM  IP CONSULT TO IV TEAM  IP CONSULT TO IV TEAM  IP CONSULT TO IV TEAM     Hospital Course:   He is a 42-year-old male with past medical history of COPD, atrial fibrillation, hypertension, hyperlipidemia, hypothyroidism, type 2 diabetes came to ER with newly diagnosed atrial fibrillation which was noted in his PCPs office. He was started on IV Cardizem, and admitted on progressive unit. Cardiology was consulted and following during hospital stay. IV Cardizem was discontinued because of hypotension. He was started on digoxin and metoprolol dose was increased. He was monitored on telemetry bed. He was started on subcu Lovenox which was switched to Eliquis. Cardiology was adjusting the dose. Later digoxin was increased and metoprolol was decreased. He is getting discharge on following medications as per cardiology. Dig dose was verified on discharge and was recommended to continue as below. He was noted to have acute on chronic diastolic heart failure with bipedal edema. Because of low blood pressure he was not tolerating diuretics. He is discharged on Lasix 20 mg daily. He also has severe aortic stenosis cardiology recommended TAVR but he refused. As per cardiology with limitations he should be discharged on dig/metoprolol/Lasix as below. Other comorbid conditions were managed by continuing home medications. His discharge to skilled nursing facility as above. He is advised to follow-up with PCP/cardiology as suggested.     On discharge he spiked temperature 101.7 , discharge placed on hold. ID consulted. Will check blood cultures, chest x-ray, UA urine cultures. .     Discharge Exam:  Vitals          Vitals:     05/20/21 0030 05/20/21 0525 05/20/21 0848 05/20/21 0930   BP: 108/70     98/68   Pulse: 116     90   Resp: 18   16 16   Temp: 98.3 °F (36.8 °C)     98.3 °F (36.8 °C) TempSrc: Oral     Oral   SpO2:     99% 95%   Weight:   189 lb 13.1 oz (86.1 kg)       Height:                    General Appearance: alert and oriented to person, place and time,  in no acute distress  Skin: warm and dry  Head: normocephalic and atraumatic  Eyes: pupils equal, round, and reactive to light, extraocular eye movements intact, conjunctivae normal  Neck: supple and non-tender without mass  Pulmonary/Chest: clear to auscultation bilaterally  Cardiovascular: Irregular, tachycardia, normal S1 and S2  Abdomen: soft, non-tender, non-distended, normal bowel sounds, no masses or organomegaly  Extremities: no cyanosis, clubbing, +2 bipedal edema  Musculoskeletal: normal range of motion  Neurologic:no cranial nerve deficit,speech normal           LABS:       Recent Labs     05/18/21  1245 05/20/21  0944   * 133   K 5.5* 3.8   CL 94* 99   CO2 28 26   BUN 20 24*   CREATININE 0.7 0.6*   GLUCOSE 192* 143*   CALCIUM 9.3 8.6             Recent Labs     05/19/21  1400   WBC 8.7   RBC 4.19   HGB 14.9   HCT 44.4   .0*   MCH 35.6*   MCHC 33.6   RDW 14.5      MPV 10.4         No results for input(s): POCGLU in the last 72 hours.        Imaging:   XR CHEST (2 VW)   Final Result   Right basilar opacity, which may represent atelectasis, pneumonia, effusion   or a combination thereof.           CT HEAD WO CONTRAST   Final Result   No acute intracranial abnormality.           US DUP LOWER EXTREMITY RIGHT REESE   Final Result   No evidence of DVT in the right lower extremity.           XR CHEST PORTABLE   Final Result   1. Cardiomegaly.   There is no evidence of failure or pneumonia.                 Patient Instructions:             Medication List             START taking these medications          apixaban 5 MG Tabs tablet  Commonly known as: ELIQUIS  Take 1 tablet by mouth 2 times daily      atorvastatin 40 MG tablet  Commonly known as: LIPITOR  Take 1 tablet by mouth nightly      * digoxin 250 MCG tablet  Commonly known as: LANOXIN  Take 1 tablet by mouth every other day  Start taking on: May 21, 2021      * digoxin 125 MCG tablet  Commonly known as: LANOXIN  Take 1 tablet by mouth every other day  Start taking on: May 22, 2021      furosemide 20 MG tablet  Commonly known as: Lasix  Take 1 tablet by mouth daily      metoprolol succinate 25 MG extended release tablet  Commonly known as: TOPROL XL  Take 1 tablet by mouth 2 times daily                 * This list has 2 medication(s) that are the same as other medications prescribed for you.  Read the directions carefully, and ask your doctor or other care provider to review them with you.                          CONTINUE taking these medications          aspirin EC 81 MG EC tablet  Take 1 tablet by mouth daily for 25 days      escitalopram 10 MG tablet  Commonly known as: LEXAPRO  Take 1 tablet by mouth daily      fluticasone-salmeterol 500-50 MCG/DOSE diskus inhaler  Commonly known as: Advair Diskus  Inhale 1 puff into the lungs every 12 hours      folic acid 1 MG tablet  Commonly known as: FOLVITE  Take 1 tablet by mouth daily      hydrocortisone 2.5 % cream  Apply topically 2 times daily.      levothyroxine 25 MCG tablet  Commonly known as: SYNTHROID  Take 1 tablet by mouth daily      predniSONE 2.5 MG tablet  Commonly known as: DELTASONE  Take 1 tablet by mouth daily      * ProAir  (90 Base) MCG/ACT inhaler  Generic drug: albuterol sulfate HFA      * albuterol sulfate  (90 Base) MCG/ACT inhaler  Commonly known as: Ventolin HFA  Inhale 2 puffs into the lungs 4 times daily as needed for Wheezing      * albuterol (2.5 MG/3ML) 0.083% nebulizer solution  Commonly known as: PROVENTIL  Take 3 mLs by nebulization every 6 hours as needed for Wheezing      vitamin D 1.25 MG (94330 UT) Caps capsule  Commonly known as: ERGOCALCIFEROL  One  Twice a week                 * This list has 3 medication(s) that are the same as other medications prescribed for you. Read the directions carefully, and ask your doctor or other care provider to review them with you.                          STOP taking these medications          doxycycline hyclate 100 MG tablet  Commonly known as: VIBRA-TABS                    Where to Get Your Medications             These medications were sent to [facility], [address] [facility]  [address], [address] [place] [address]     Phone: [phone] ·   apixaban 5 MG Tabs tablet  · atorvastatin 40 MG tablet      Information about where to get these medications is not yet available    Ask your nurse or doctor about these medications  · digoxin 125 MCG tablet  · digoxin 250 MCG tablet  · furosemide 20 MG tablet  · metoprolol succinate 25 MG extended release tablet               Note that more than 30 minutes was spent in preparing discharge papers, discussing discharge with patient, medication review, etc.     Signed:  Electronically signed by Falguni Limon MD on 5/20/2021 at 3:42 PM     NOTE: This report was transcribed using voice recognition software.  Every effort was made to ensure accuracy; however, inadvertent computerized transcription errors may be present.               Routing History

## 2021-05-20 NOTE — PROGRESS NOTES
Unique Smyth, NP with cardiology returned call regarding digoxin and toprol. Pt is to be discharged on current doses of ordered medications. Called Dr. Tanvir Gracia and informed of this.

## 2021-05-20 NOTE — PROGRESS NOTES
Called Dr. Yovany Briones and informed of temp 101.7. New orders received for ID consult. ID consult placed. Dr. Hayden Rosales returned call and updated on new consult, dr will place orders. Called and spoke with pt's wife, Jamel Kraus, and informed pt will not not be discharged this evening.

## 2021-05-20 NOTE — CARE COORDINATION
CASE MANAGEMENT. .. Chart reviewed. Med adjustments being made accordingly by cardio for rate control. iNeves santosert is good through today. Await pcp input. Will cont to follow along.

## 2021-05-21 NOTE — CONSULTS
[address] [place] [address] [facility]  Consultation Note     Admit Date: 5/13/2021 12:41 PM    Reason for Consult:   Fever    Attending Physician:  Ronald Muñoz MD    HISTORY OF PRESENT ILLNESS:             The history is obtained from extensive review of available past medical records. The patient is a 80 y.o. male who presents to the ED on 5/13/2021 because of right lower extremity swelling and shortness of breath. He was admitted with new onset atrial fibrillation with rapid ventricular response. He was seen by cardiology. He was given digoxin. Could not tolerate Cardizem because of hypotension. He was known to be discharged to [facility] on 5/20/2021 but he had a fever. Discharge was held and ID was asked to evaluate him.      Past Medical History:        Diagnosis Date    Acute on chronic diastolic congestive heart failure (HCC)     Anxiety     Asthma     Bladder cancer (HCC)     Chronic sinusitis     Closed displaced midcervical fracture of left femur ([facility] 1/28/7331    Complication of internal orthopedic device, initial encounter ([facility] [address] [facility] 5/14/2018    Hardware not a problem, just in the way of fixation of broken hip, must be removed to insert hemiarthroplasty    Constipation, chronic     COPD     Degenerative joint disease of left knee 5/15/2018    Depression     Gastritis     hx of gastritis    Hearing loss     Hyperlipidemia     Hypertension     Hypothyroidism     Iron deficiency anemia     Left leg pain 7/15/2019    Prediabetes     Type 2 diabetes mellitus without complication ([address]     Vitamin D deficiency      Past Surgical History:        Procedure Laterality Date    COLONOSCOPY      FEMUR FRACTURE SURGERY  04/12/2010    IM nailing left femoral shaft    FRACTURE SURGERY      VA PARTIAL HIP REPLACEMENT Left 5/14/2018    HIP HEMIARTHROPLASTY LEFT, WITH  REMOVAL OF PREVIOUS HARDWARE, INSERTION OF MEDICATION DELIVERY SYSTEM (WITH INTRAOPERATIVE FLUOROSCOPY) performed by Rosemary Funes MD at [facility] 26      10/10     Current Medications:   Scheduled Meds:   digoxin  250 mcg Oral Every Other Day    digoxin  125 mcg Oral Every Other Day    metoprolol succinate  25 mg Oral BID    apixaban  5 mg Oral BID    atorvastatin  40 mg Oral Nightly    miconazole nitrate   Topical BID    folic acid  1 mg Oral Daily    levothyroxine  25 mcg Oral Daily    predniSONE  2.5 mg Oral Daily    sodium chloride flush  5-40 mL Intravenous 2 times per day    Arformoterol Tartrate  15 mcg Nebulization BID    And    budesonide  0.5 mg Nebulization BID     Continuous Infusions:   sodium chloride       PRN Meds:sodium chloride flush, sodium chloride, promethazine **OR** ondansetron, polyethylene glycol, acetaminophen **OR** acetaminophen, perflutren lipid microspheres    Allergies:  Cephalexin and Sulfa antibiotics    Social History:   Social History     Socioeconomic History    Marital status:      Spouse name: None    Number of children: None    Years of education: None    Highest education level: None   Occupational History    Occupation: retired     Comment: self employed   Tobacco Use    Smoking status: Former Smoker     Years: 20.00     Types: Cigars     Quit date: 1980     Years since quittin.0    Smokeless tobacco: Never Used    Tobacco comment: quit smoking 40 years ago   Vaping Use    Vaping Use: Never used   Substance and Sexual Activity    Alcohol use:  Yes     Alcohol/week: 7.0 standard drinks     Types: 7 Cans of beer per week     Comment: 1 beer daily    Drug use: Never    Sexual activity: Never     Partners: Female   Other Topics Concern    None   Social History Narrative        COPD    TICS    COLON POLYP    HTN    LIPID    [place]    ANX/DEP    EARLY HYPOTHYROID    EARLY DIET DM    COLON     TMT     CA BLADDER---ARLINE----DR BRYANT    BPH    EGD  MATT    FRACTURE L FEMUR SHAFT Negative  Respiratory: Negative   Cardiovascular: As in HPI  GI: Negative  : Negative  Musculoskeletal: Denies history of gout. Admits to having pain in his right wrist.  Nursing reports that an IV infiltrated. A few days ago. Skin: No rashes. PHYSICAL EXAM:    Vitals:   BP 94/60 Comment: manual   Pulse 105   Temp 98.8 °F (37.1 °C) (Axillary)   Resp 18   Ht 6' 3\" (1.905 m)   Wt 183 lb 12.8 oz (83.4 kg)   SpO2 94%   BMI 22.97 kg/m²   Constitutional: The patient is awake, alert, and oriented. Slightly hard of hearing. No distress. Skin: Warm and dry. No rashes were noted. HEENT: Eyes show round, and reactive pupils. No jaundice. Moist mucous membranes, no ulcerations, no thrush. Neck: Supple to movements. No lymphadenopathy. Chest: No use of accessory muscles to breathe. Symmetrical expansion. Auscultation reveals no wheezing, crackles, or rhonchi. Cardiovascular: S1 and S2 are rhythmic and regular. No murmurs appreciated. Abdomen: Positive bowel sounds to auscultation. Benign to palpation. No masses felt. No hepatosplenomegaly. Extremities: The right dorsal wrist is erythematous, swollen and tender to the touch. He is unable to pronate the wrist.  The elbow was unremarkable.   Lines: peripheral      CBC+dif:  Recent Labs     05/19/21  1400   WBC 8.7   HGB 14.9   HCT 44.4   .0*      NEUTROABS 5.83     Lab Results   Component Value Date    CRP 0.5 (H) 09/18/2014      No results found for: CRPHS  Lab Results   Component Value Date    SEDRATE 23 (H) 09/18/2014    SEDRATE 141 (H) 10/07/2010    SEDRATE 141 (H) 10/06/2010     Lab Results   Component Value Date    ALT 16 05/14/2021    AST 39 05/14/2021    ALKPHOS 43 05/14/2021    BILITOT 1.1 05/14/2021     Lab Results   Component Value Date     05/20/2021    K 3.8 05/20/2021    K 4.3 05/14/2021    CL 99 05/20/2021    CO2 26 05/20/2021    BUN 24 05/20/2021    CREATININE 0.6 05/20/2021    GFRAA >60 05/20/2021    LABGLOM >60 05/20/2021    GLUCOSE 143 05/20/2021    GLUCOSE 101 10/09/2010    PROT 6.5 05/14/2021    LABALBU 2.9 05/14/2021    LABALBU 2.8 10/07/2010    CALCIUM 8.6 05/20/2021    BILITOT 1.1 05/14/2021    ALKPHOS 43 05/14/2021    AST 39 05/14/2021    ALT 16 05/14/2021       Lab Results   Component Value Date    PROTIME 14.9 05/14/2021    INR 1.3 05/14/2021       Lab Results   Component Value Date    TSH 1.850 05/13/2021       Lab Results   Component Value Date    COLORU DARK YELLOW 05/21/2021    PHUR 7.0 05/21/2021    WBCUA 0-1 05/21/2021    WBCUA 0-1 10/07/2010    RBCUA 0-1 05/21/2021    RBCUA 0-1 10/07/2010    BACTERIA NONE SEEN 05/21/2021    CLARITYU Clear 05/21/2021    SPECGRAV 1.015 05/21/2021    LEUKOCYTESUR Negative 05/21/2021    UROBILINOGEN >=8.0 05/21/2021    BILIRUBINUR SMALL 05/21/2021    BILIRUBINUR NEGATIVE 10/07/2010    BLOODU TRACE-LYSED 05/21/2021    GLUCOSEU Negative 05/21/2021    GLUCOSEU NEGATIVE 10/07/2010       No results found for: HCO3, BE, O2SAT, PH, THGB, PCO2, PO2, TCO2  Radiology:  Noted    Microbiology:  Pending  No results for input(s): BC in the last 72 hours. No results for input(s): ORG in the last 72 hours. No results for input(s): Rajendra Josephine in the last 72 hours. No results for input(s): STREPNEUMAGU in the last 72 hours. No results for input(s): LP1UAG in the last 72 hours. No results for input(s): ASO in the last 72 hours. No results for input(s): CULTRESP in the last 72 hours. Recent Labs     05/19/21  1400   PROCAL 0.79*       Assessment:  · Right wrist arthritis. Possible septic arthritis. Nursing reports that the area had a previous IV infiltration  · Fever associated to the above    Plan:    · X-ray right wrist  · Check cultures, baseline ESR, CRP, ASO titer  · Consult orthopedic surgery  · Will follow with you    Thank you for having us see this patient in consultation. I will be discussing this case with the treating physicians.     Miranda Payne MD  11:07 AM  5/21/2021

## 2021-05-21 NOTE — PROGRESS NOTES
Dr Orman Lanes is on call for [facility], notified of consult via secure text.  Cristóbal rowland

## 2021-05-21 NOTE — CARE COORDINATION
COVID negative . Discharge cancelled yesterday to [facility] d/t elevated temp. ID following-await final ID plan. Precert to [facility] - OT dept notified for updated notes today(updated PT note is not needed). Tiffanie @ [address] [place] [address] notified to initiate precert when notes are entered. N-17, HENS, transport form on chart.  Will follow Lili Larson RN case manager    Addendum 1052: Per Tiffanie @ [facility], [facility] precert was initiated Lili Larson RN case manager

## 2021-05-21 NOTE — PLAN OF CARE
Problem: Falls - Risk of:  Goal: Will remain free from falls  Description: Will remain free from falls  Outcome: Met This Shift  Goal: Absence of physical injury  Description: Absence of physical injury  Outcome: Met This Shift     Problem: Pain:  Goal: Pain level will decrease  Description: Pain level will decrease  Outcome: Met This Shift  Goal: Control of acute pain  Description: Control of acute pain  Outcome: Met This Shift  Goal: Control of chronic pain  Description: Control of chronic pain  Outcome: Met This Shift     Problem: FLUID AND ELECTROLYTE IMBALANCE  Goal: Fluid and electrolyte balance are achieved/maintained  Outcome: Met This Shift     Problem: HH FLUID RETENTION-CHF  Goal: Absence of fluid overload signs and symptoms  Outcome: Met This Shift     Problem: Cardiac Output - Decreased:  Goal: Cardiac rhythm stable  Outcome: Met This Shift     Problem: Skin Integrity:  Goal: Will show no infection signs and symptoms  Description: Will show no infection signs and symptoms  Outcome: Met This Shift  Goal: Absence of new skin breakdown  Description: Absence of new skin breakdown  Outcome: Met This Shift     Problem: Musculor/Skeletal Functional Status  Goal: Highest potential functional level  Outcome: Met This Shift  Goal: Absence of falls  Outcome: Met This Shift

## 2021-05-21 NOTE — CONSULTS
Department of Orthopedic Surgery  [facility] Niurka Nunez MD  Consult      Reason for Consult:  Right wrist cellulitis    Consulting Physician: Dr Alfredito Snyder MD    HISTORY OF PRESENT ILLNESS:                The patient is a 80 y.o. male who was previously admitted for A. fib with RVR. Per review of the medical record patient was scheduled to be discharged but was noted to have a temperature and increased confusion. Infectious disease consultation was placed. Patient does have a history of a IV infiltrate in his right wrist.  There was concern for possible septic arthritis of his wrist.  Patient denies any wrist pain. He does have localized tenderness over his previous IV infiltration site with some mild surrounding cellulitis.     Past Medical History:        Diagnosis Date    Acute on chronic diastolic congestive heart failure (HCC)     Anxiety     Asthma     Bladder cancer ([address]     Chronic sinusitis     Closed displaced midcervical fracture of left femur ([address] 9/29/8145    Complication of internal orthopedic device, initial encounter ([address] 5/14/2018    Hardware not a problem, just in the way of fixation of broken hip, must be removed to insert hemiarthroplasty    Constipation, chronic     COPD     Degenerative joint disease of left knee 5/15/2018    Depression     Gastritis     hx of gastritis    Hearing loss     Hyperlipidemia     Hypertension     Hypothyroidism     Iron deficiency anemia     Left leg pain 7/15/2019    Prediabetes     Type 2 diabetes mellitus without complication ([address]     Vitamin D deficiency      Past Surgical History:        Procedure Laterality Date    COLONOSCOPY      FEMUR FRACTURE SURGERY  04/12/2010    IM nailing left femoral shaft    FRACTURE SURGERY      NH PARTIAL HIP REPLACEMENT Left 5/14/2018    HIP HEMIARTHROPLASTY LEFT, WITH  REMOVAL OF PREVIOUS HARDWARE, INSERTION OF MEDICATION DELIVERY SYSTEM (WITH INTRAOPERATIVE FLUOROSCOPY) performed by Cedrick Dennis Age of Onset    Arthritis Mother     Cancer Mother         glaucoma    Heart Disease Father     Coronary Art Dis Father     Emphysema Father     Diabetes Other     Arthritis Son        REVIEW OF SYSTEMS:  Constitutional: Positive for fevers, chills, diaphoresis  Neurologic: Negative   Psychiatric: Negative  Rheumatologic: Negative   Endocrine: Negative  Hematologic: Negative  Immunologic: Negative  ENT: Negative  Respiratory: Negative   Cardiovascular: As in HPI  GI: Negative  : Negative  Musculoskeletal: Denies history of gout. Denies wrist pain. However he does have localized tenderness over his IV infiltration site   skin: No rashes. PHYSICAL EXAM:    VITALS:  BP 94/60 Comment: manual   Pulse 105   Temp 98.8 °F (37.1 °C) (Axillary)   Resp 18   Ht 6' 3\" (1.905 m)   Wt 183 lb 12.8 oz (83.4 kg)   SpO2 94%   BMI 22.97 kg/m²   CONSTITUTIONAL:  awake, alert, cooperative, no apparent distress, and appears stated age  EYES:  Lids and lashes normal, pupils equal, round and reactive to light, extra ocular muscles intact, sclera clear, conjunctiva normal  ENT:  Normocephalic, without obvious abnormality, atraumatic, sinuses nontender on palpation, external ears without lesions, oral pharynx with moist mucus membranes, tonsils without erythema or exudates, gums normal and good dentition. NEUROLOGIC:  Awake and alert. Cranial nerves II-XII are grossly intact. Motor is 5 out of 5 bilaterally. Sensory is intact. MUSCULOSKELETAL:    Right upper extremity: Nontender about the shoulder and elbow. Negative lymphadenopathy. Localized firm nodular area over the dorsum of the wrist.  No fluctuance. Nontender over the volar radial or ulnar aspects of the wrist.  No significant pain with passive motion of the wrist.  Mild discomfort with wrist flexion over the dorsum of the wrist.  Full digital range of motion thumb index middle ring and small fingers. 2+ radial pulse. Brisk cap refill of digits. Sensation grossly intact in the radial, ulnar and median nerve distributions    DATA:    CBC:   Lab Results   Component Value Date    WBC 8.7 05/19/2021    RBC 4.19 05/19/2021    HGB 14.9 05/19/2021    HCT 44.4 05/19/2021    .0 05/19/2021    MCH 35.6 05/19/2021    MCHC 33.6 05/19/2021    RDW 14.5 05/19/2021     05/19/2021    MPV 10.4 05/19/2021     PT/INR:    Lab Results   Component Value Date    PROTIME 14.9 05/14/2021    INR 1.3 05/14/2021       Radiology Review: X-rays of the right wrist were ordered    CBC, ESR, and CRP ordered    IMPRESSION:  · Right arm cellulitis at IV infiltration site without clinical findings to suggest septic arthritis  · A. Fib, hypertension  · COPD  · Depression  · Gastritis  · Vitamin D deficiency    PLAN:  Await further work-up including x-rays and infectious markers. No clinical signs to suggest septic arthritis at this time. Recommended local wound care for cellulitis secondary to IV infiltration site.   Antibiotics as per ID recommendations

## 2021-05-21 NOTE — PROGRESS NOTES
[facility]   Progress Note    Admitting Date and Time: 5/13/2021 12:41 PM  Admit Dx: Atrial fibrillation with RVR ([address] [I48.91]     Seen for follow on new fever & multiple other problems as listed below    Subjective:  Denies CP ,sob ,n/v/d/abd pain. Has rt hand / wrist swelling. Fever noted yesterday prior to discharge. ID is consulted , Ortho is consulted. D/w nursing. He is a poor historian. ROS: denies fever, chills, cp, sob, n/v, HA unless stated above.      digoxin  250 mcg Oral Every Other Day    digoxin  125 mcg Oral Every Other Day    metoprolol succinate  25 mg Oral BID    apixaban  5 mg Oral BID    atorvastatin  40 mg Oral Nightly    miconazole nitrate   Topical BID    folic acid  1 mg Oral Daily    levothyroxine  25 mcg Oral Daily    predniSONE  2.5 mg Oral Daily    sodium chloride flush  5-40 mL Intravenous 2 times per day    Arformoterol Tartrate  15 mcg Nebulization BID    And    budesonide  0.5 mg Nebulization BID     sodium chloride flush, 5-40 mL, PRN  sodium chloride, 25 mL, PRN  promethazine, 12.5 mg, Q6H PRN   Or  ondansetron, 4 mg, Q6H PRN  polyethylene glycol, 17 g, Daily PRN  acetaminophen, 650 mg, Q6H PRN   Or  acetaminophen, 650 mg, Q6H PRN  perflutren lipid microspheres, 1.5 mL, ONCE PRN         Objective:    BP 94/60 Comment: manual   Pulse 105   Temp 98.8 °F (37.1 °C) (Axillary)   Resp 18   Ht 6' 3\" (1.905 m)   Wt 183 lb 12.8 oz (83.4 kg)   SpO2 94%   BMI 22.97 kg/m²   General Appearance: alert and oriented to person, place and time,  in no acute distress  Skin: warm and dry, rt hand /wrist with swelling , bruise , IV infiltration, erythema  Head: normocephalic and atraumatic  Eyes: pupils equal, round, and reactive to light, extraocular eye movements intact, conjunctivae normal  Neck: supple and non-tender without mass  Pulmonary/Chest: clear to auscultation bilaterally  Cardiovascular:mild tachy , irreg , S1 S2 N  Abdomen: soft, non-tender, non-distended, normal bowel sounds, no masses or organomegaly  Extremities: no cyanosis, clubbing, with bipedal edema   Musculoskeletal: normal range of motion    Neuro : no cranial nerve deficit,  speech normal      Recent Labs     05/20/21  0944      K 3.8   CL 99   CO2 26   BUN 24*   CREATININE 0.6*   GLUCOSE 143*   CALCIUM 8.6       Recent Labs     05/19/21  1400 05/21/21  1450   WBC 8.7 6.8   RBC 4.19 3.88   HGB 14.9 13.9   HCT 44.4 40.8   .0* 105.2*   MCH 35.6* 35.8*   MCHC 33.6 34.1   RDW 14.5 14.1    162   MPV 10.4 10.5     Labs and images reviewed     Radiology:   XR CHEST (2 VW)   Final Result   1. Small bilateral pleural effusions. 2. Right basilar pulmonary opacity may represent atelectasis and/or pneumonia. XR CHEST (2 VW)   Final Result   Right basilar opacity, which may represent atelectasis, pneumonia, effusion   or a combination thereof. CT HEAD WO CONTRAST   Final Result   No acute intracranial abnormality. US DUP LOWER EXTREMITY RIGHT REESE   Final Result   No evidence of DVT in the right lower extremity. XR CHEST PORTABLE   Final Result   1. Cardiomegaly. There is no evidence of failure or pneumonia. XR WRIST RIGHT (MIN 3 VIEWS)    (Results Pending)       Assessment:    Active Problems: Aortic valve stenosis    Atrial fibrillation with RVR (HCC)    Acute on chronic diastolic congestive heart failure (HCC)  Resolved Problems:    * No resolved hospital problems. *      Plan:  A. fib with RVR -unable to be on Cardizem drip due to hypotension, cardiology is consulted and following, medications have been adjusted, currently on dig 125 mcg alternate with 250 mcg daily and metoprolol reduced to 25 mg twice daily. Lovenox switched to Eliquis. Acute decompensated diastolic heart failure with bipedal edema-Lasix low-dose  started. Severe aortic stenosis -patient refusing TAVR.   Will need to be cautious with 02-Dec-2019 04:39

## 2021-05-21 NOTE — PROGRESS NOTES
Physical Therapy  Facility/Department: [address] INTERMEDIATE 1  Daily Treatment Note  NAME: Ashia Amezcua  : 1935  MRN: [id number]    Date of Service: 2021      Patient Diagnosis(es): The encounter diagnosis was Atrial fibrillation with RVR ([facility] has a past medical history of Acute on chronic diastolic congestive heart failure ([facility] Anxiety, Asthma, Bladder cancer ([facility] Chronic sinusitis, Closed displaced midcervical fracture of left femur ([facility] Complication of internal orthopedic device, [facility] Constipation, chronic, COPD, Degenerative joint disease of left knee, Depression, Gastritis, Hearing loss, Hyperlipidemia, Hypertension, Hypothyroidism, Iron deficiency anemia, Left leg pain, Prediabetes, Type 2 diabetes mellitus without complication ([facility] and Vitamin D deficiency. has a past surgical history that includes Upper gastrointestinal endoscopy; Colonoscopy; Femur fracture surgery (2010); fracture surgery; and pr partial hip replacement (Left, 2018).    Referring Provider:  Vera Romero MD     Evaluating PT:  Jasmeet Live PT, DPT [id number]     Room #:  8190/3887-A  Diagnosis:  A-fib  Precautions:  Fall risk  Equipment Needs:  None. Per chart pt owns walker.     SUBJECTIVE:     Pt lives with his wife in a 1 story home with 4 stairs to enter. Bed and bath is on first floor. Pt ambulated with walker PTA. Social history limited due to pt very hard of hearing.      OBJECTIVE:    Initial Evaluation  Date:  Treatment  2021  Short Term/ Long Term   Goals   Was pt agreeable to Eval/treatment? yes  yes , with encouragement      Does pt have pain?  None reported       Bed Mobility  Rolling: SBA  Supine to sit: SBA  Sit to supine: SBA  Scooting: SBA Supine to sit mod A  Sit to supine max A  Scooting max A independent   Transfers Sit to stand: Min A  Stand to sit: Min A  Stand pivot: Min A with w/w  NT independendent   Ambulation    15 feet x 2 with w/w Min A evaluation [id number]  []? High Complexity PT evaluation [id number]  []? PT Re-evaluation [id number]  []? Gait training 90192 _ minutes  [x]? Therapeutic activities 59763 10 minutes  [x]?  Therapeutic exercises 46664 _ 15 minutes        Shagufta Davis [id number]

## 2021-05-21 NOTE — ADT AUTH CERT
Utilization Reviews       Atrial Fibrillation - Care Day 8 (5/20/2021) by Praful Laboy RN       Review Status Review Entered   Completed 5/21/2021 11:09      Criteria Review      Care Day: 8 Care Date: 5/20/2021 Level of Care: Intermediate Care    Guideline Day 2    Level Of Care    (X) ICU, intermediate care, or telemetry to discharge    5/21/2021 11:09 AM EDT by Soha Schwarz      5/20 - intermediate unit    Clinical Status    ( ) * Hemodynamic stability    ( ) * Sinus rhythm or acceptable ventricular rate    5/21/2021 11:09 AM EDT by Soha Schwarz      AFIB RVR    ( ) * No evidence of myocardial ischemia    ( ) * Mental status at baseline    ( ) * Tachypnea absent    (X) * Hypoxemia absent    5/21/2021 11:09 AM EDT by Soha Schwarz      95% ra    ( ) * Anticoagulants regimen for next level of care established    ( ) * Discharge plans and education understood    Activity    ( ) * Ambulatory or acceptable for next level of care    Routes    (X) * Oral hydration, medications, and diet    5/21/2021 11:09 AM EDT by Soha Schwarz      see notes    Interventions    (X) Cardiac monitoring    5/21/2021 11:09 AM EDT by Soha Schwarz      continuous    Medications    (X) Possible rate and rhythm control medications    5/21/2021 11:09 AM EDT by Soha Schwarz      toprol xl, lanoxin    * Milestone   Additional Notes   5/20    remains on intermediate unit    afebrile yesterday    mt today 101.7 18 118 98//62    95% ra  AFIB RVR on monitor    +dyspnea w/ exertion       bs 143   bun 24  cr. 0.6       CARDIO A/P:   The patient presently remains compensated from a cardiovascular    standpoint with no clinical volume overload and persistent    marginal rate control of his atrial fibrillation.       Telemetry findings reviewed: atrial fibrillation with a persistent    mean rate of approximately 100-110 bpm, no new tachy/bradyarrhythmias overnight       ASSESSMENT / PLAN: On a clinical basis, the patient remains

## 2021-05-22 NOTE — PROGRESS NOTES
[facility]   Progress Note    Admitting Date and Time: 5/13/2021 12:41 PM  Admit Dx: Atrial fibrillation with RVR ([address] [I48.91]     Seen for follow on new fever & multiple other problems as listed below    Subjective:  Uneventful night , denies CP , sob , n/v/d/abd pain. Rt hand /wrist swelling marginally better. febrile over last 24 hrs. D/w nursing. ROS: denies fever, chills, cp, sob, n/v, HA unless stated above.      digoxin  250 mcg Oral Every Other Day    digoxin  125 mcg Oral Every Other Day    metoprolol succinate  25 mg Oral BID    apixaban  5 mg Oral BID    atorvastatin  40 mg Oral Nightly    miconazole nitrate   Topical BID    folic acid  1 mg Oral Daily    levothyroxine  25 mcg Oral Daily    predniSONE  2.5 mg Oral Daily    sodium chloride flush  5-40 mL Intravenous 2 times per day    Arformoterol Tartrate  15 mcg Nebulization BID    And    budesonide  0.5 mg Nebulization BID     sodium chloride flush, 5-40 mL, PRN  sodium chloride, 25 mL, PRN  promethazine, 12.5 mg, Q6H PRN   Or  ondansetron, 4 mg, Q6H PRN  polyethylene glycol, 17 g, Daily PRN  acetaminophen, 650 mg, Q6H PRN   Or  acetaminophen, 650 mg, Q6H PRN  perflutren lipid microspheres, 1.5 mL, ONCE PRN         Objective:    /70 Comment: manual   Pulse 108   Temp 97.3 °F (36.3 °C) (Temporal)   Resp 18   Ht 6' 3\" (1.905 m)   Wt 185 lb 3.2 oz (84 kg)   SpO2 96%   BMI 23.15 kg/m²   General Appearance: alert and oriented to person, place and time, in no acute distress  Skin: warm and dry, rt hand /ist with swelling , bruise / Iv infiltration  Head: normocephalic and atraumatic  Eyes: pupils equal, round, and reactive to light, extraocular eye movements intact, conjunctivae normal  Neck: supple and non-tender without mass  Pulmonary/Chest: clear to auscultation bilaterally  Cardiovascular:mild tachy , irreg, normal S1 and S2  Abdomen: soft, non-tender, non-distended, normal bowel sounds, no masses or organomegaly  Extremities: no cyanosis, clubbing,bipedal edema  Musculoskeletal: normal range of motion  Neurologic:no cranial nerve deficit,  speech normal      Recent Labs     05/20/21  0944 05/22/21  0324    133   K 3.8 4.0   CL 99 98   CO2 26 29   BUN 24* 26*   CREATININE 0.6* 0.7   GLUCOSE 143* 104*   CALCIUM 8.6 9.0       Recent Labs     05/19/21  1400 05/21/21  1450 05/22/21  0324   WBC 8.7 6.8 5.7   RBC 4.19 3.88 3.87   HGB 14.9 13.9 13.3   HCT 44.4 40.8 41.1   .0* 105.2* 106.2*   MCH 35.6* 35.8* 34.4   MCHC 33.6 34.1 32.4   RDW 14.5 14.1 14.1    162 185   MPV 10.4 10.5 10.7     Labs and images reviewed     Radiology:   XR WRIST RIGHT (MIN 3 VIEWS)   Final Result   Osteoarthritic changes         XR CHEST (2 VW)   Final Result   1. Small bilateral pleural effusions. 2. Right basilar pulmonary opacity may represent atelectasis and/or pneumonia. XR CHEST (2 VW)   Final Result   Right basilar opacity, which may represent atelectasis, pneumonia, effusion   or a combination thereof. CT HEAD WO CONTRAST   Final Result   No acute intracranial abnormality. US DUP LOWER EXTREMITY RIGHT REESE   Final Result   No evidence of DVT in the right lower extremity. XR CHEST PORTABLE   Final Result   1. Cardiomegaly. There is no evidence of failure or pneumonia. CT WRIST RIGHT WO CONTRAST    (Results Pending)       Assessment:    Active Problems: Aortic valve stenosis    Atrial fibrillation with RVR ([facility])    Acute on chronic diastolic congestive heart failure (HCC)  Resolved Problems:    * No resolved hospital problems. *      Plan:  A. fib with RVR -unable to be on Cardizem drip due to hypotension, cardiology is consulted and following, medications have been adjusted, currently on dig 125 mcg alternate with 250 mcg daily and metoprolol reduced to 25 mg twice daily. Lovenox switched to Eliquis.     Acute decompensated diastolic heart failure with bipedal edema-Lasix low-dose  started. Severe aortic stenosis -patient refusing TAVR. Will need to be cautious with diuretics. Hypothyroidism abuse continue Synthroid as per home    COPD continue bronchodilators. New fever as above-ID and Ortho consulted. Chest x-ray no new change compared to the earlier , blood cultures pending, UA without UTI, urine cultures pending. As per Ortho right arm cellulitis/IV infiltration without findings suggestive of septic arthritis. X-rays with degenerative changes. ESR / ASO / CRP elevated.      On Eliquis okay for DVT prophylaxis  Full code          Electronically signed by Elenora Schwab, MD on 5/22/2021 at 10:27 AM

## 2021-05-22 NOTE — PROGRESS NOTES
atorvastatin (LIPITOR) tablet 40 mg, 40 mg, Oral, Nightly, Elsy Hernandez, APRN - CNP, 40 mg at 05/21/21 2101    miconazole nitrate 2 % ointment, , Topical, BID, Kenya Hernandez MD, Given at 78/91/44 1560    folic acid (FOLVITE) tablet 1 mg, 1 mg, Oral, Daily, Luz Alvarez MD, 1 mg at 05/22/21 0749    levothyroxine (SYNTHROID) tablet 25 mcg, 25 mcg, Oral, Daily, Luz Alvarez MD, 25 mcg at 05/22/21 0503    predniSONE (DELTASONE) tablet 2.5 mg, 2.5 mg, Oral, Daily, Luz Alvarez MD, 2.5 mg at 05/22/21 0748    sodium chloride flush 0.9 % injection 5-40 mL, 5-40 mL, Intravenous, 2 times per day, Luz Alvarez MD, 10 mL at 05/22/21 0754    sodium chloride flush 0.9 % injection 5-40 mL, 5-40 mL, Intravenous, PRN, Luz Alvarez MD, 10 mL at 05/19/21 1902    0.9 % sodium chloride infusion, 25 mL, Intravenous, PRN, Luz Alvarez MD    promethazine (PHENERGAN) tablet 12.5 mg, 12.5 mg, Oral, Q6H PRN **OR** ondansetron (ZOFRAN) injection 4 mg, 4 mg, Intravenous, Q6H PRN, Luz Alvarez MD    polyethylene glycol (GLYCOLAX) packet 17 g, 17 g, Oral, Daily PRN, Luz Alvarez MD    acetaminophen (TYLENOL) tablet 650 mg, 650 mg, Oral, Q6H PRN, 650 mg at 05/20/21 1548 **OR** acetaminophen (TYLENOL) suppository 650 mg, 650 mg, Rectal, Q6H PRN, Luz Alvarez MD    perflutren lipid microspheres (DEFINITY) injection 1.65 mg, 1.5 mL, Intravenous, ONCE PRN, Luz Alvarez MD    Arformoterol Tartrate (BROVANA) nebulizer solution 15 mcg, 15 mcg, Nebulization, BID, 15 mcg at 05/22/21 0915 **AND** budesonide (PULMICORT) nebulizer suspension 500 mcg, 0.5 mg, Nebulization, BID, Luz Alvarez MD, 500 mcg at 05/22/21 0921  Allergies   Allergen Reactions    Cephalexin     Sulfa Antibiotics      Social History     Socioeconomic History    Marital status:      Spouse name: Not on file    Number of children: Not on file    Years of education: Not on file    Highest education level: Not on file   Occupational History    Occupation: retired     Comment: self employed   Tobacco Use    Smoking status: Former Smoker     Years: 20.00     Types: Cigars     Quit date: 1980     Years since quittin.0    Smokeless tobacco: Never Used    Tobacco comment: quit smoking 40 years ago   Vaping Use    Vaping Use: Never used   Substance and Sexual Activity    Alcohol use: Yes     Alcohol/week: 7.0 standard drinks     Types: 7 Cans of beer per week     Comment: 1 beer daily    Drug use: Never    Sexual activity: Never     Partners: Female   Other Topics Concern    Not on file   Social History Narrative        COPD    TICS    COLON POLYP    HTN    LIPID    [place]    ANX/DEP    EARLY HYPOTHYROID    EARLY DIET DM    COLON     TMT -    CA BLADDER---PICHETTE----DR BRYANT    BPH    EGD  MATT    FRACTURE L FEMUR SHAFT 4-10    L KNEE OR 12-10 TO CLEAR OUT INFECTION-DR ECKERT--LIFELONG DOXY BID    WORKING OUT WORKS EVERY DAY AND THREE [employer] BEERS WHEN HE GETS HOME TOLD TO ME BY    WIFE     PARALYSIS DIAPHRAGM WITH DR HUMMEL 9-15----THEN DR VILLALOBOS    MOTHER  AT 80    DAD  AT 79 CAD    VIT D DEFIC 10-16    HYPOTHYROID 2-17    HAS 4 CA    PULM DR VELEZ    RHEUM DR NEWTON    ONE BEER AT [employer] DAILY AND TWO AT HOME DAILY    FRAC LEFT FEMUR  DR ECKERT----OLD HARDWARE OUT     Social Determinants of Health     Financial Resource Strain:     Difficulty of Paying Living Expenses:    Food Insecurity:     Worried About Running Out of Food in the Last Year:     Ran Out of Food in the Last Year:    Transportation Needs:     Lack of Transportation (Medical):      Lack of Transportation (Non-Medical):    Physical Activity:     Days of Exercise per Week:     Minutes of Exercise per Session:    Stress:     Feeling of Stress :    Social Connections:     Frequency of Communication with Friends and Family:     Frequency of Social Gatherings with Friends and Family:     Attends Buddhist Services:     Active Member of Clubs or Organizations:     Attends Club or Organization Meetings:     Marital Status:    Intimate Partner Violence:     Fear of Current or Ex-Partner:     Emotionally Abused:     Physically Abused:     Sexually Abused:      Family History   Problem Relation Age of Onset    Arthritis Mother     Cancer Mother         glaucoma    Heart Disease Father     Coronary Art Dis Father     Emphysema Father     Diabetes Other     Arthritis Son        Skin: (-) rash,(-) psoriasis,(-) eczema, (-)skin cancer. Musculoskeletal: Right hand pain  Neurologic: (-) epilepsy, (-)seizures,(-) brain tumor,(-) TIA, (-)stroke, (-)headaches, (-)Parkinson disease,(-) memory loss, (-) LOC. Cardiovascular: (-) Chest pain, (-) swelling in legs/feet, (-) SOB, (-) cramping in legs/feet with walking. SUBJECTIVE:      Constitutional:    The patient is alert and oriented x 3, appears to be stated age and in no distress. Right upper extremity: None tender of the shoulder and elbow. Negative lymphadenopathy. Shoulder elbow and wrist range of motion intact. Localized erythema over the dorsum of the wrist.  Small area of nodular hypersensitivity consistent with his IV infiltration site. Able to fully flex and extend the fingers. Nontender over the radial and ulnar aspects of the wrist.  No warmth or erythema or effusion about the wrist.  Full digital flexion extension of the thumb index middle ring and small fingers. Neurovascular intact. CT scan of the right wrist completed yesterday was reviewed. Axial, coronal, sagittal images were reviewed. No joint effusion. Advanced degenerative changes about the wrist are present. No localizable abscess appreciated in the dorsal soft tissues. Soft tissue changes consistent with cellulitis. This is consistent with the radiologist interpretation.       X-rays completed yesterday of the right wrist AP lateral obliques were reviewed. This demonstrates advanced degenerative changes about the carpus. No erosive changes. No significant effusion present. Impression: Right dorsal wrist IV infiltration with cellulitis. No findings on clinical exam or CT findings suggestive of septic arthritis. Findings are consistent with cellulitis dorsal wrist.    Plan:     Patient feels things are improving. No definitive findings to suggest surgical intervention is needed. Continue with local IV infiltration site care. Continue antibiotics per infectious disease recommendations.   Reconsult if needed

## 2021-05-22 NOTE — PROGRESS NOTES
erythematous, swollen, and tender to the touch- patient says its better. Lines: peripheral    Laboratory and Tests Review:  Lab Results   Component Value Date    WBC 5.7 05/22/2021    WBC 6.8 05/21/2021    WBC 8.7 05/19/2021    HGB 13.3 05/22/2021    HCT 41.1 05/22/2021    .2 (H) 05/22/2021     05/22/2021     Lab Results   Component Value Date    NEUTROABS 3.80 05/22/2021    NEUTROABS 5.32 05/21/2021    NEUTROABS 5.83 05/19/2021     No results found for: CRPHS  Lab Results   Component Value Date    ALT 16 05/14/2021    AST 39 05/14/2021    ALKPHOS 43 05/14/2021    BILITOT 1.1 05/14/2021     Lab Results   Component Value Date     05/22/2021    K 4.0 05/22/2021    K 4.3 05/14/2021    CL 98 05/22/2021    CO2 29 05/22/2021    BUN 26 05/22/2021    CREATININE 0.7 05/22/2021    CREATININE 0.6 05/20/2021    CREATININE 0.7 05/18/2021    GFRAA >60 05/22/2021    LABGLOM >60 05/22/2021    GLUCOSE 104 05/22/2021    GLUCOSE 101 10/09/2010    PROT 6.5 05/14/2021    LABALBU 2.9 05/14/2021    LABALBU 2.8 10/07/2010    CALCIUM 9.0 05/22/2021    BILITOT 1.1 05/14/2021    ALKPHOS 43 05/14/2021    AST 39 05/14/2021    ALT 16 05/14/2021     Lab Results   Component Value Date    CRP 18.3 (H) 05/21/2021    CRP 0.5 (H) 09/18/2014     Lab Results   Component Value Date    SEDRATE 80 (H) 05/21/2021    SEDRATE 23 (H) 09/18/2014    SEDRATE 141 (H) 10/07/2010     Radiology:  Sushant walsh reviewed    CT right wrist ordered by [facility]     Microbiology:   Blood cultures 5/20/2021 - negative so far  MRSA screen - negative  Urine culture 5/21/2021 - growth not present  Strep/Legionella urine antigens - negative         Recent Labs     05/19/21  1400   PROCAL 0.79*       ASSESSMENT:  · Right wrist arthritis. Possible septic arthritis.   Nursing reports that the area had a previous IV infiltration  · Fever associated to the above, improved     PLAN:  · Continue off antibiotics at this time   · Ortho following - ordered CT of wrist, await final plans    · Check final cultures  · Monitor labs - CRP 18.3, ESR 80, , WBC 5.7    NAE Stroud CNP  11:59 AM  5/22/2021     Patient seen and examined. I had a face to face encounter with the patient. Agree with exam.  Assessment and plan as outlined above and directed by me. Addition and corrections were done as deemed appropriate. My exam and plan include: The patient is doing slightly better. The right wrist, however is still swollen and tender to the touch. Orthopedic surgery input appreciated. CT has been ordered.     Misha Kyle MD  5/22/2021  12:21 PM

## 2021-05-23 NOTE — PLAN OF CARE
Problem: Falls - Risk of:  Goal: Will remain free from falls  Description: Will remain free from falls  5/23/2021 0117 by Courtney Swan RN  Outcome: Met This Shift     Problem: Falls - Risk of:  Goal: Absence of physical injury  Description: Absence of physical injury  5/23/2021 0117 by Courtney Swan RN  Outcome: Met This Shift     Problem: Pain:  Goal: Pain level will decrease  Description: Pain level will decrease  5/23/2021 0117 by Courtney Swan RN  Outcome: Met This Shift     Problem: Pain:  Goal: Control of acute pain  Description: Control of acute pain  5/23/2021 0117 by Courtney Swan RN  Outcome: Met This Shift     Problem: Pain:  Goal: Control of chronic pain  Description: Control of chronic pain  5/23/2021 0117 by Courtney Swan RN  Outcome: Met This Shift     Problem: Skin Integrity:  Goal: Will show no infection signs and symptoms  Description: Will show no infection signs and symptoms  5/23/2021 0117 by Courtney Swan RN  Outcome: Met This Shift     Problem: Skin Integrity:  Goal: Absence of new skin breakdown  Description: Absence of new skin breakdown  5/23/2021 0117 by Courtney Swan RN  Outcome: Met This Shift

## 2021-05-23 NOTE — PROGRESS NOTES
[facility]   Progress Note    Admitting Date and Time: 5/13/2021 12:41 PM  Admit Dx: Atrial fibrillation with RVR ([address] [I48.91]     Seen for follow on new fever & multiple other problems as listed below    Subjective:  Uneventful night , will d/w nursing. [place]. Denies CP ,sob , n/v/d/ abdominal pain. Rt hand / wrist swelling marginally improved c/f earlier. ROS: denies fever, chills, cp, sob, n/v, HA unless stated above.      digoxin  250 mcg Oral Every Other Day    digoxin  125 mcg Oral Every Other Day    metoprolol succinate  25 mg Oral BID    apixaban  5 mg Oral BID    atorvastatin  40 mg Oral Nightly    miconazole nitrate   Topical BID    folic acid  1 mg Oral Daily    levothyroxine  25 mcg Oral Daily    predniSONE  2.5 mg Oral Daily    sodium chloride flush  5-40 mL Intravenous 2 times per day    Arformoterol Tartrate  15 mcg Nebulization BID    And    budesonide  0.5 mg Nebulization BID     sodium chloride flush, 5-40 mL, PRN  sodium chloride, 25 mL, PRN  promethazine, 12.5 mg, Q6H PRN   Or  ondansetron, 4 mg, Q6H PRN  polyethylene glycol, 17 g, Daily PRN  acetaminophen, 650 mg, Q6H PRN   Or  acetaminophen, 650 mg, Q6H PRN  perflutren lipid microspheres, 1.5 mL, ONCE PRN         Objective:    /66 Comment: manual   Pulse 84   Temp 98 °F (36.7 °C) (Oral)   Resp 16   Ht 6' 3\" (1.905 m)   Wt 186 lb 11.7 oz (84.7 kg)   SpO2 96%   BMI 23.34 kg/m²   General Appearance: alert and oriented to person, place and time,  in no acute distress  Skin: warm and dry, rt hand /wrist with swelling , bruise , IV infiltration, erythema  Head: normocephalic and atraumatic  Eyes: pupils equal, round, and reactive to light, extraocular eye movements intact, conjunctivae normal  Neck: supple and non-tender without mass  Pulmonary/Chest: clear to auscultation bilaterally  Cardiovascular:mild tachy , irreg , S1 S2 N  Abdomen: soft, non-tender, non-distended, normal bowel sounds, no masses or organomegaly  Extremities: no cyanosis, clubbing, with bipedal edema   Musculoskeletal: normal range of motion    Neuro : no cranial nerve deficit,  speech normal      Recent Labs     05/22/21  0324      K 4.0   CL 98   CO2 29   BUN 26*   CREATININE 0.7   GLUCOSE 104*   CALCIUM 9.0       Recent Labs     05/21/21  1450 05/22/21  0324   WBC 6.8 5.7   RBC 3.88 3.87   HGB 13.9 13.3   HCT 40.8 41.1   .2* 106.2*   MCH 35.8* 34.4   MCHC 34.1 32.4   RDW 14.1 14.1    185   MPV 10.5 10.7     Labs and images reviewed     Radiology:   CT WRIST RIGHT WO CONTRAST   Final Result   1. No acute fracture or dislocation. 2. No CT evidence of osteomyelitis. If there is high clinical suspicion for   osteomyelitis, MRI of the wrist would be more sensitive. 3. Osteopenia. Advanced degenerative change. Subcutaneous edema about the   wrist and dorsal aspect of the hand, consistent with cellulitis. 4. Arterial sclerotic disease. XR WRIST RIGHT (MIN 3 VIEWS)   Final Result   Osteoarthritic changes         XR CHEST (2 VW)   Final Result   1. Small bilateral pleural effusions. 2. Right basilar pulmonary opacity may represent atelectasis and/or pneumonia. XR CHEST (2 VW)   Final Result   Right basilar opacity, which may represent atelectasis, pneumonia, effusion   or a combination thereof. CT HEAD WO CONTRAST   Final Result   No acute intracranial abnormality. US DUP LOWER EXTREMITY RIGHT REESE   Final Result   No evidence of DVT in the right lower extremity. XR CHEST PORTABLE   Final Result   1. Cardiomegaly. There is no evidence of failure or pneumonia. Assessment:    Active Problems: Aortic valve stenosis    Atrial fibrillation with RVR (HCC)    Acute on chronic diastolic congestive heart failure (HCC)  Resolved Problems:    * No resolved hospital problems.  *      Plan:  A. fib with RVR -unable to be on Cardizem drip due to hypotension, cardiology is consulted and following, medications have been adjusted, currently on dig 125 mcg alternate with 250 mcg daily and metoprolol reduced to 25 mg twice daily. Lovenox switched to Eliquis. Acute decompensated diastolic heart failure with bipedal edema-Lasix low-dose  started. Severe aortic stenosis -patient refusing TAVR. Will need to be cautious with diuretics. Hypothyroidism abuse continue Synthroid as per home    COPD continue bronchodilators. New fever as above-ID and Ortho consulted. Chest x-ray no new change compared to the earlier , blood cultures pending, UA without UTI, urine cultures pending. As per Ortho right arm cellulitis/IV infiltration without findings suggestive of septic arthritis. X-rays with degenerative changes. Follow inflammatory markers. As per ID fever related to rt arm IV infiltration and has resolved. ID sign off.      On Eliquis okay for DVT prophylaxis  Full code          Electronically signed by Sarika Conroy MD on 5/23/2021 at 11:09 AM

## 2021-05-23 NOTE — PROGRESS NOTES
[address] [place] [address] [facility]  Progress Note    SUBJECTIVE:  Chief Complaint   Patient presents with    Atrial Fibrillation     new onset     The patient is doing better. No new complaints. No fevers. Review of systems:  As stated above in the chief complaint, otherwise negative. Medications:  Scheduled Meds:   digoxin  250 mcg Oral Every Other Day    digoxin  125 mcg Oral Every Other Day    metoprolol succinate  25 mg Oral BID    apixaban  5 mg Oral BID    atorvastatin  40 mg Oral Nightly    miconazole nitrate   Topical BID    folic acid  1 mg Oral Daily    levothyroxine  25 mcg Oral Daily    predniSONE  2.5 mg Oral Daily    sodium chloride flush  5-40 mL Intravenous 2 times per day    Arformoterol Tartrate  15 mcg Nebulization BID    And    budesonide  0.5 mg Nebulization BID     Continuous Infusions:   sodium chloride       PRN Meds:sodium chloride flush, sodium chloride, promethazine **OR** ondansetron, polyethylene glycol, acetaminophen **OR** acetaminophen, perflutren lipid microspheres    OBJECTIVE:  /66 Comment: manual   Pulse 84   Temp 98 °F (36.7 °C) (Oral)   Resp 16   Ht 6' 3\" (1.905 m)   Wt 186 lb 11.7 oz (84.7 kg)   SpO2 96%   BMI 23.34 kg/m²   Temp  Av °F (36.7 °C)  Min: 97.1 °F (36.2 °C)  Max: 98.4 °F (36.9 °C)  Constitutional: The patient is awake, alert, and oriented. Hard of hearing. No distress. Skin: Warm and dry. No rashes were noted. HEENT: Round and reactive pupils. Moist mucous membranes. No ulcerations or thrush. Neck: Supple to movements. Chest: No respiratory distress. Room air. No crackles  Cardiovascular: Heart sounds rhythmic and regular. Abdomen: Positive bowel sounds to auscultation. Benign to palpation. No masses felt. Extremities: The right wrist is less erythematous. Range of motion slightly better.   Lines: peripheral    Laboratory and Tests Review:  Lab Results   Component Value Date    WBC 5.7 2021 WBC 6.8 05/21/2021    WBC 8.7 05/19/2021    HGB 13.3 05/22/2021    HCT 41.1 05/22/2021    .2 (H) 05/22/2021     05/22/2021     Lab Results   Component Value Date    NEUTROABS 3.80 05/22/2021    NEUTROABS 5.32 05/21/2021    NEUTROABS 5.83 05/19/2021     No results found for: CRP  Lab Results   Component Value Date    ALT 16 05/14/2021    AST 39 05/14/2021    ALKPHOS 43 05/14/2021    BILITOT 1.1 05/14/2021     Lab Results   Component Value Date     05/22/2021    K 4.0 05/22/2021    K 4.3 05/14/2021    CL 98 05/22/2021    CO2 29 05/22/2021    BUN 26 05/22/2021    CREATININE 0.7 05/22/2021    CREATININE 0.6 05/20/2021    CREATININE 0.7 05/18/2021    GFRAA >60 05/22/2021    LABGLOM >60 05/22/2021    GLUCOSE 104 05/22/2021    GLUCOSE 101 10/09/2010    PROT 6.5 05/14/2021    LABALBU 2.9 05/14/2021    LABALBU 2.8 10/07/2010    CALCIUM 9.0 05/22/2021    BILITOT 1.1 05/14/2021    ALKPHOS 43 05/14/2021    AST 39 05/14/2021    ALT 16 05/14/2021     Lab Results   Component Value Date    CRP 18.3 (H) 05/21/2021    CRP 0.5 (H) 09/18/2014     Lab Results   Component Value Date    SEDRATE 80 (H) 05/21/2021    SEDRATE 23 (H) 09/18/2014    SEDRATE 141 (H) 10/07/2010     Radiology:  Nloan walsh reviewed    CT right wrist ordered by ortho     Microbiology:   Blood cultures 5/20/2021 - negative so far  MRSA screen - negative  Urine culture 5/21/2021 - growth not present  Strep/Legionella urine antigens - negative         No results for input(s): PROCAL in the last 72 hours. ASSESSMENT:  · Right wrist IV infiltration. Possible arthritis.   Improving  · Fever associated to the above, recent  · Fever associated to the above, improved     PLAN:  · ID will sign off    Tenisha Álvarez MD  11:17 AM  5/23/2021

## 2021-05-24 NOTE — PROGRESS NOTES
Physical Therapy  Facility/Department: [address] INTERMEDIATE 1  Daily Treatment Note  NAME: Genaro Ball  : 1935  MRN: [id number]    Date of Service: 2021      Patient Diagnosis(es): The encounter diagnosis was Atrial fibrillation with RVR ([facility] has a past medical history of Acute on chronic diastolic congestive heart failure ([address] Anxiety, Asthma, Bladder cancer ([facility] Chronic sinusitis, Closed displaced midcervical fracture of left femur ([facility] Complication of internal orthopedic device, initial encounter ([facility] Constipation, chronic, COPD, Degenerative joint disease of left knee, Depression, Gastritis, Hearing loss, Hyperlipidemia, Hypertension, Hypothyroidism, Iron deficiency anemia, Left leg pain, Prediabetes, Type 2 diabetes mellitus without complication ([facility] [facility] and Vitamin D deficiency. has a past surgical history that includes Upper gastrointestinal endoscopy; Colonoscopy; Femur fracture surgery (2010); fracture surgery; and pr partial hip replacement (Left, 2018).    Referring Provider:  Spike Stokes MD     Evaluating PT:  Halina Jerome, PT, DPT [id number]     Room #:  1670/7706-O  Diagnosis:  A-fib  Precautions:  Fall risk  Equipment Needs:  None. Per chart pt owns walker.     SUBJECTIVE:     Pt lives with his wife in a 1 story home with 4 stairs to enter. Bed and bath is on first floor. Pt ambulated with walker PTA. Social history limited due to pt very hard of hearing.      OBJECTIVE:    Initial Evaluation  Date:  Treatment  2021  Short Term/ Long Term   Goals   Was pt agreeable to Eval/treatment? yes  yes , with encouragement      Does pt have pain? None reported       Bed Mobility  Rolling: SBA  Supine to sit: SBA  Sit to supine: SBA  Scooting: SBA Supine to sit min A  Sit to supine NT  Scooting mod A seated to EOB independent   Transfers Sit to stand: Min A  Stand to sit: Min A  Stand pivot: Min A with w/w  Sit to stand;  Mod A  Stand to sit: Mod A  Stand

## 2021-05-24 NOTE — PROGRESS NOTES
SPEECH/LANGUAGE PATHOLOGY  VIDEOFLUOROSCOPIC STUDY OF SWALLOWING (MBS)   and PLAN OF CARE    PATIENT NAME:  Ortega Chambers  (male)     MRN:  [id number]    :  1935  (80 y.o.)  STATUS:  Inpatient: Room 5450/2359-M    TODAY'S DATE:  2021  REFERRING PROVIDER:   Dr Carolina Freedman MD   SPECIFIC PROVIDER ORDER: FL modified barium swallow with video  Date of order:  21   REASON FOR REFERRAL: assess for need for liquid thickener   EVALUATING THERAPIST: JANNETTE Traore      RESULTS:      DYSPHAGIA DIAGNOSIS:  moderate oropharyngeal phase dysphagia      DIET RECOMMENDATIONS:  Minced and moist consistency solids (dysphagia 2) with  nectar consistency (mildly thick) liquids a tolerated-No straws    FEEDING RECOMMENDATIONS:    Assistance level:  Stand by assistance is needed during all oral intake cue to increase oral intake and use compensatory strategies     Compensatory strategies recommended: Double swallow, Small bites/sips, Alternate solids and liquids and No straw     Discussed recommendations with nursing and/or faxed report to referring provider: Yes    [address]   The dysphagia POC is established based on physician order and dysphagia diagnosis    Skilled SLP intervention for dysphagia management on acute care 3-5 x per week until goals met, pt plateaus in function and/or discharged from hospital      Conditions Requiring Skilled Therapeutic Intervention for dysphagia:    Reduced oral control of bolus   Reduced pharyngeal clearing of the bolus  Reduced laryngeal closure resulting in aspiration   Wet vocal quality during PO intake  Oral motor strength/coordination impairment    SPECIFIC DYSPHAGIA INTERVENTIONS TO INCLUDE:     Compensatory strategy training   Therapeutic exercises  Trials of upgraded diet/liquid   Deep Pharyngeal Neuromuscular Stimulation (DPNS)    Specific instructions for next treatment:  Train on comp strats and initiate exercises    Treatment Goals:    Short Term Goals:  Ongoing mealtime assessment to provide diet modification and compensatory strategy implementation to minimize risk of aspiration associated with PO intake  Pt will complete PO trials of upgraded diet textures with SLP only to determine the least restrictive PO diet to maintain adequate nutrition/hydration with no more than 1 overt s/s of pen/asp. Pt will complete oral motor strength/ coordination exercises to improve bolus prep/ control and mastication with  maximal verbal prompts . Pt will complete BOTR strength/ ROM exercises to reduce pharyngeal residuals and improve epiglottic inversion maximal verbal prompts  Pt will complete laryngeal strength/ ROM therapeutic exercises to improve airway protection for the least restrictive PO diet maximal   Patient will participate in DPNS to address functional deficits identified during swallow evaluation    Long Term Goals:   Pt will maintain adequate nutrition/hydration via PO intake of the least restrictive oral diet with implementation of safe swallow/ compensatory strategies and decrease signs/symptoms of aspiration to less than 1 x/day. Pt will improve oropharyngeal swallow function to ensure airway protection during PO intake to maintain adequate nutrition/hydration and decrease signs/symptoms of aspiration to less than 1 x/day. Patient/family Goal:    Did not state. Will further assess during treatment.     Plan of care discussed with Patient   The Patient understand(s) the diagnosis, prognosis and plan of care     Rehabilitation Potential/Prognosis: fair                      ADMITTING DIAGNOSIS: Atrial fibrillation with RVR ([facility] [address] [I48.91]     VISIT DIAGNOSIS:         PATIENT REPORT/COMPLAINT: none    PRIOR LEVEL OF SWALLOW FUNCTION:    Past History of Dysphagia?:  none reported    DIET DURING HOSPITAL ADMISSION: Regular consistency solids with honey consistency (moderately thick) liquids    PROCEDURE:  Consistencies Administered During the Evaluation   Liquids: thin liquid and nectar thick liquid   Solids:  pureed foods and solid foods      Method of Intake:   cup, straw, spoon  Self fed, Fed by clinician      Position:   Seated, upright, Lateral plane    CLINICAL ASSESSMENT:    ORAL PREPARATION PHASE:    Slow Mastication, Uncoordinated Mastication and Decreased Bolus Formation    ORAL PHASE:   Impaired AP Movement and Impaired Mastication    PHARYNGEAL PHASE:     ONSET TIME       Delayed initiation of the pharyngeal swallow was noted with swallow reflex triggered at the level of the vallecula, to PS with straw use       PHARYNGEAL RESIDUALS        Vallecula/Pharyngeal Wall           Reduced tongue base retraction resulting in residuals in vallecula and/or posterior pharyngeal wall for all consistencies administered which partially cleared  with spontaneous double swallow       Pyriform Sinuses      Residuals in the pyriform sinuses were noted due to pharyngeal weakness for all consistencies administered  which  partially cleared  with spontaneous double swallow      LARYNGEAL PENETRATION   Laryngeal penetration occurred prior to aspiration. Further details under aspiration section. ASPIRATION  Aspiration occurred AFTER the swallow for thin liquid due to residuals in the laryngeal vestibule . Aspiration was mild-moderate and occurred consistently .   an absent cough/throat clear was noted    PENETRATION-ASPIRATION SCALE (PAS):  THIN 8 = Material enters the airway, passes below the vocal folds, and no effort is made to eject   MILDLY THICK 1 = Material does not enter the airway  MODERATELY THICK item not administered  PUREE 1 = Material does not enter the airway  HARD SOLID 1 = Material does not enter the airway       COMPENSATORY STRATEGIES    Compensatory strategies that were beneficial included Double swallow, Small bites/sips and Alternate solids and liquids and Compensatory strategies that were not beneficial included Throat clear      STRUCTURAL/FUNCTIONAL ANOMALIES   No structural/functional anomalies were noted    CERVICAL ESOPHAGEAL STAGE :     The cervical esophagus appeared adequate          ___________    Cognition:   Confusion noted and Hard of hearing    Oral Peripheral Examination   Generalized oral weakness    Current Respiratory Status   room air     Parameters of Speech Production  Respiration:  Adequate for speech production  Quality:   Within functional limits  Intensity: Within functional limits    Pain: No pain reported. EDUCATION:   The Speech Language Pathologist (SLP) completed education regarding results of evaluation and that intervention is warranted at this time. Learner: Patient  Education: Reviewed results and recommendations of this evaluation  Evaluation of Education:  Needs further instruction    This plan may be re-evaluated and revised as warranted. Evaluation Time includes thorough review of current medical information, gathering information on past medical history/social history and prior level of function, completion of standardized testing/informal observation of tasks, assessment of data and education on plan of care and goals. INTERVENTION    Pt/family educated on above results and plan of care. Pt/family trained on compensatory strategies for safe swallow and signs and symptoms of aspiration (throat clearing, coughing/choking, wet vocal quality. , etc.) and encouraged to notify staff if observed. Handouts provided as warranted. Pt/family encouraged to engage in question/answer session. All questions answered and pt/family verbalized understanding of above. [x]The admitting diagnosis and active problem list, have been reviewed prior to initiation of this evaluation.     CPT Code: 72736  dysphagia study, 69824 dysphagia therapy    ACTIVE PROBLEM LIST:   Patient Active Problem List   Diagnosis    Acquired varus deformity knee    COPD without exacerbation ([facility] [address]    Mixed

## 2021-05-24 NOTE — PLAN OF CARE
Problem: Falls - Risk of:  Goal: Will remain free from falls  Description: Will remain free from falls  5/24/2021 1556 by Fredi Delgado RN  Outcome: Completed  5/24/2021 1300 by Fredi Delgado RN  Outcome: Met This Shift  Goal: Absence of physical injury  Description: Absence of physical injury  5/24/2021 1556 by Fredi Delgado RN  Outcome: Completed  5/24/2021 1300 by Fredi Delgado RN  Outcome: Met This Shift     Problem: Pain:  Goal: Pain level will decrease  Description: Pain level will decrease  5/24/2021 1556 by Fredi Delgado RN  Outcome: Completed  5/24/2021 1300 by Fredi Delgado RN  Outcome: Met This Shift  Goal: Control of acute pain  Description: Control of acute pain  5/24/2021 1556 by Fredi Delgado RN  Outcome: Completed  5/24/2021 1300 by Fredi Delgado RN  Outcome: Met This Shift  Goal: Control of chronic pain  Description: Control of chronic pain  5/24/2021 1556 by Fredi Delgado RN  Outcome: Completed  5/24/2021 1300 by Fredi Delgado RN  Outcome: Met This Shift     Problem: Skin Integrity:  Goal: Will show no infection signs and symptoms  Description: Will show no infection signs and symptoms  5/24/2021 1556 by Fredi Delgado RN  Outcome: Completed  5/24/2021 1300 by Fredi Delgado RN  Outcome: Met This Shift  Goal: Absence of new skin breakdown  Description: Absence of new skin breakdown  5/24/2021 1556 by Fredi Delgado RN  Outcome: Completed  5/24/2021 1300 by Fredi Delgado RN  Outcome: Met This Shift     Problem: Musculor/Skeletal Functional Status  Goal: Highest potential functional level  5/24/2021 1556 by Fredi Delgado RN  Outcome: Completed  5/24/2021 1300 by Fredi Delgado RN  Outcome: Met This Shift  Goal: Absence of falls  5/24/2021 1556 by Fredi Delgado RN  Outcome: Completed  5/24/2021 1300 by Fredi Delgado RN  Outcome: Met This Shift     Problem: Falls - Risk of:  Goal: Will remain free from falls  Description: Will remain free from falls  5/24/2021 1556 by Shaila Greene RN  Outcome: Completed  5/24/2021 1300 by Shaila Greene RN  Outcome: Met This Shift  Goal: Absence of physical injury  Description: Absence of physical injury  5/24/2021 1556 by Shaila Greene RN  Outcome: Completed  5/24/2021 1300 by Shaila Greene RN  Outcome: Met This Shift     Problem: Pain:  Goal: Pain level will decrease  Description: Pain level will decrease  5/24/2021 1556 by Shaila Greene RN  Outcome: Completed  5/24/2021 1300 by Shaila Greene RN  Outcome: Met This Shift  Goal: Control of acute pain  Description: Control of acute pain  5/24/2021 1556 by Shaila Greene RN  Outcome: Completed  5/24/2021 1300 by Shaila Greene RN  Outcome: Met This Shift  Goal: Control of chronic pain  Description: Control of chronic pain  5/24/2021 1556 by Shaila Greene RN  Outcome: Completed  5/24/2021 1300 by Shaila Greene RN  Outcome: Met This Shift     Problem: Skin Integrity:  Goal: Will show no infection signs and symptoms  Description: Will show no infection signs and symptoms  5/24/2021 1556 by Shaila Greene RN  Outcome: Completed  5/24/2021 1300 by Shaila Greene RN  Outcome: Met This Shift  Goal: Absence of new skin breakdown  Description: Absence of new skin breakdown  5/24/2021 1556 by Shaila Greene RN  Outcome: Completed  5/24/2021 1300 by Shaila Greene RN  Outcome: Met This Shift     Problem: Musculor/Skeletal Functional Status  Goal: Highest potential functional level  5/24/2021 1556 by Shaila Greene RN  Outcome: Completed  5/24/2021 1300 by Shaila Greene RN  Outcome: Met This Shift  Goal: Absence of falls  5/24/2021 1556 by Shaila Greene RN  Outcome: Completed  5/24/2021 1300 by Shaila Greene RN  Outcome: Met This Shift     Problem: Falls - Risk of:  Goal: Will remain free from falls  Description: Will remain free from falls  5/24/2021 1556 by Shaila Greene RN  Outcome: Completed  5/24/2021 1300 by Shaila Greene RN  Outcome: Met This Shift  Goal: Absence of physical injury  Description: Absence of physical injury  5/24/2021 1556 by Duke Obrien RN  Outcome: Completed  5/24/2021 1300 by Duke Obrien RN  Outcome: Met This Shift     Problem: Pain:  Goal: Pain level will decrease  Description: Pain level will decrease  5/24/2021 1556 by Duke Obrien RN  Outcome: Completed  5/24/2021 1300 by Duke Obrien RN  Outcome: Met This Shift  Goal: Control of acute pain  Description: Control of acute pain  5/24/2021 1556 by Duke Obrien RN  Outcome: Completed  5/24/2021 1300 by Duke Obrien RN  Outcome: Met This Shift  Goal: Control of chronic pain  Description: Control of chronic pain  5/24/2021 1556 by Duke Obrien RN  Outcome: Completed  5/24/2021 1300 by Duke Obrien RN  Outcome: Met This Shift     Problem: Skin Integrity:  Goal: Will show no infection signs and symptoms  Description: Will show no infection signs and symptoms  5/24/2021 1556 by Duke Obrien RN  Outcome: Completed  5/24/2021 1300 by Duke Obrien RN  Outcome: Met This Shift  Goal: Absence of new skin breakdown  Description: Absence of new skin breakdown  5/24/2021 1556 by Duke Obrien RN  Outcome: Completed  5/24/2021 1300 by Duke Obrien RN  Outcome: Met This Shift     Problem: Musculor/Skeletal Functional Status  Goal: Highest potential functional level  5/24/2021 1556 by Duke Obrien RN  Outcome: Completed  5/24/2021 1300 by Duke Obrien RN  Outcome: Met This Shift  Goal: Absence of falls  5/24/2021 1556 by Duke Obrien RN  Outcome: Completed  5/24/2021 1300 by Duke Obrien RN  Outcome: Met This Shift

## 2021-05-24 NOTE — DISCHARGE SUMMARY
[facility] Physician Discharge Summary       [facility] at [address] [address]  [address], [address] [place] [address]  [phone]                Cardiology if needed    MD Suzanna Alvesva [address]  [address] [facility],[address] [id number] [phone]            Activity level: As tolerated    Diet: DIET CARDIAC; Moderately Thick (Honey)    Dispo:SNF    Condition on Discharge - stable     Patient ID:  Alanna Garza  [id number]  10 y.o.  1935    Admit date: 5/13/2021    Discharge date and time:  5/24/2021  11:24 AM    Admission Diagnoses: Active Problems: Aortic valve stenosis    Atrial fibrillation with RVR (HCC)    Acute on chronic diastolic congestive heart failure (HCC)  Resolved Problems:    * No resolved hospital problems. *      Discharge Diagnoses: Active Problems: Aortic valve stenosis    Atrial fibrillation with RVR (HCC)    Acute on chronic diastolic congestive heart failure (HCC)  Resolved Problems:    * No resolved hospital problems. *      Consults:  IP CONSULT TO CARDIOLOGY  IP CONSULT TO IV TEAM  IP CONSULT TO IV TEAM  IP CONSULT TO IV TEAM  IP CONSULT TO IV TEAM  IP CONSULT TO IV TEAM  IP CONSULT TO INFECTIOUS DISEASES  IP CONSULT TO ORTHOPEDIC SURGERY    Hospital Course:   He is a 60-year-old male with past medical history of COPD, atrial fibrillation, hypertension, hyperlipidemia, hypothyroidism, type 2 diabetes came to ER with newly diagnosed atrial fibrillation which was noted in his PCPs office. He was started on IV Cardizem, and admitted on progressive unit. Cardiology was consulted and following during hospital stay. IV Cardizem was discontinued because of hypotension. He was started on digoxin and metoprolol dose was increased. He was monitored on telemetry bed. He was started on subcu Lovenox which was switched to Eliquis.   Cardiology was adjusting the dose of BB , dig . Later digoxin was increased and metoprolol was decreased. He is getting discharge on following medications as per cardiology. Dig dose was verified on discharge and was recommended to continue as below. He was noted to have acute on chronic diastolic heart failure with bipedal edema. Because of low blood pressure he was not tolerating diuretics. He is discharged on Lasix 20 mg daily. He also has severe aortic stenosis,cardiology recommended TAVR but he refused. As per cardiology with limitations in management  he should be discharged on dig/metoprolol/Lasix as below. Other comorbid conditions were managed by continuing home medications. His discharge to skilled nursing facility as above. He is advised to follow-up with PCP/cardiology as suggested.     As above he was supposed to be discharged 2 days ago but on the day of discharge  he spiked temperature 101.7 , so discharge placed on hold &   ID was  consulted. CXR did not show new changes c/f earlier, US without uti & Blood cxs are neg so far. As per  ID transient fever is sec to rt arm IV  Infiltration , X rays with deg OA , as per ortho no evidence of septic arthritis. No antibiotics were recomeneded Both service [address] discharge.       He is disharge to NH in stbable status     Discharge Exam:  Vitals:    05/23/21 2015 05/23/21 2315 05/24/21 0213 05/24/21 0915   BP: 98/66 (!) 141/93  130/82   Pulse: 79 91  78   Resp: 16 16  18   Temp: 98.5 °F (36.9 °C) 98.2 °F (36.8 °C)  98.2 °F (36.8 °C)   TempSrc: Oral Oral  Oral   SpO2: 96% 96%     Weight:   190 lb 0.6 oz (86.2 kg)    Height:           General Appearance: alert and oriented to person, place and time, [place]   Skin: warm and dry, rt hand / wrist with swelling , bruise , IV infiltrate , erythema  Head: normocephalic and atraumatic  Eyes: pupils equal, round, and reactive to light, extraocular eye movements intact, conjunctivae normal  Neck: supple and non-tender without mass  Pulmonary/Chest: clear to auscultation bilaterally  Cardiovascular: irreg ,  normal S1 and S2,   Abdomen: soft, non-tender, non-distended, normal bowel sounds, no masses or organomegaly  Extremities: no cyanosis, clubbing, with bipedal edema   Musculoskeletal: normal range of motion  Neurologic: no cranial nerve deficit, speech normal      LABS:  Recent Labs     05/22/21  0324      K 4.0   CL 98   CO2 29   BUN 26*   CREATININE 0.7   GLUCOSE 104*   CALCIUM 9.0       Recent Labs     05/21/21  1450 05/22/21  0324   WBC 6.8 5.7   RBC 3.88 3.87   HGB 13.9 13.3   HCT 40.8 41.1   .2* 106.2*   MCH 35.8* 34.4   MCHC 34.1 32.4   RDW 14.1 14.1    185   MPV 10.5 10.7       No results for input(s): POCGLU in the last 72 hours. Imaging:   CT WRIST RIGHT WO CONTRAST   Final Result   1. No acute fracture or dislocation. 2. No CT evidence of osteomyelitis. If there is high clinical suspicion for   osteomyelitis, MRI of the wrist would be more sensitive. 3. Osteopenia. Advanced degenerative change. Subcutaneous edema about the   wrist and dorsal aspect of the hand, consistent with cellulitis. 4. Arterial sclerotic disease. XR WRIST RIGHT (MIN 3 VIEWS)   Final Result   Osteoarthritic changes         XR CHEST (2 VW)   Final Result   1. Small bilateral pleural effusions. 2. Right basilar pulmonary opacity may represent atelectasis and/or pneumonia. XR CHEST (2 VW)   Final Result   Right basilar opacity, which may represent atelectasis, pneumonia, effusion   or a combination thereof. CT HEAD WO CONTRAST   Final Result   No acute intracranial abnormality. US DUP LOWER EXTREMITY RIGHT REESE   Final Result   No evidence of DVT in the right lower extremity. XR CHEST PORTABLE   Final Result   1. Cardiomegaly. There is no evidence of failure or pneumonia.              Patient Instructions:      Medication List      START taking these medications    apixaban 5 MG Tabs tablet  Commonly known as: ELIQUIS  Take 1 tablet by mouth 2 times daily     atorvastatin 40 MG tablet  Commonly known as: LIPITOR  Take 1 tablet by mouth nightly     * digoxin 250 MCG tablet  Commonly known as: LANOXIN  Take 1 tablet by mouth every other day     * digoxin 125 MCG tablet  Commonly known as: LANOXIN  Take 1 tablet by mouth every other day     furosemide 20 MG tablet  Commonly known as: Lasix  Take 1 tablet by mouth daily     metoprolol succinate 25 MG extended release tablet  Commonly known as: TOPROL XL  Take 1 tablet by mouth 2 times daily         * This list has 2 medication(s) that are the same as other medications prescribed for you. Read the directions carefully, and ask your doctor or other care provider to review them with you. CONTINUE taking these medications    aspirin EC 81 MG EC tablet  Take 1 tablet by mouth daily for 25 days     escitalopram 10 MG tablet  Commonly known as: LEXAPRO  Take 1 tablet by mouth daily     fluticasone-salmeterol 500-50 MCG/DOSE diskus inhaler  Commonly known as: Advair Diskus  Inhale 1 puff into the lungs every 12 hours     folic acid 1 MG tablet  Commonly known as: FOLVITE  Take 1 tablet by mouth daily     hydrocortisone 2.5 % cream  Apply topically 2 times daily.      levothyroxine 25 MCG tablet  Commonly known as: SYNTHROID  Take 1 tablet by mouth daily     predniSONE 2.5 MG tablet  Commonly known as: DELTASONE  Take 1 tablet by mouth daily     * ProAir  (90 Base) MCG/ACT inhaler  Generic drug: albuterol sulfate HFA     * albuterol sulfate  (90 Base) MCG/ACT inhaler  Commonly known as: Ventolin HFA  Inhale 2 puffs into the lungs 4 times daily as needed for Wheezing     * albuterol (2.5 MG/3ML) 0.083% nebulizer solution  Commonly known as: PROVENTIL  Take 3 mLs by nebulization every 6 hours as needed for Wheezing     vitamin D 1.25 MG (24248 UT) Caps capsule  Commonly known as: ERGOCALCIFEROL  One  Twice a week         * This list has 3 medication(s) that are the same as other medications prescribed for you. Read the directions carefully, and ask your doctor or other care provider to review them with you. STOP taking these medications    doxycycline hyclate 100 MG tablet  Commonly known as: VIBRA-TABS           Where to Get Your Medications      These medications were sent to [facility], [address] [address]    Phone: [phone]   · apixaban 5 MG Tabs tablet  · atorvastatin 40 MG tablet     Information about where to get these medications is not yet available    Ask your nurse or doctor about these medications  · digoxin 125 MCG tablet  · digoxin 250 MCG tablet  · furosemide 20 MG tablet  · metoprolol succinate 25 MG extended release tablet           Note that more than 30 minutes was spent in preparing discharge papers, discussing discharge with patient, medication review, etc.    Signed:  Electronically signed by Justina Trejo MD on 5/24/2021 at 11:24 AM    NOTE: This report was transcribed using voice recognition software. Every effort was made to ensure accuracy; however, inadvertent computerized transcription errors may be present.

## 2021-05-24 NOTE — PROGRESS NOTES
Occupational Therapy  OT BEDSIDE TREATMENT NOTE      Date:2021  Patient Name: Maxim Wing  MRN: [id number]  : 1935  Room: [address] [place] [address]     Referring Deon Martins MD     Evaluating OT: Nicole Lucia OTR/L [id number]     AM-PAC Daily Activity Raw Score:      Recommended Adaptive Equipment: TBD     Diagnosis: afib with RVR.        Pertinent Medical History: CHF, asthma, COPD, HTN, DM   [address] Living: Pt lives with wife in a single story home. Bathroom setup: walk in shower with seat, standard commode      Prior Level of Function: PLOF from pt report, pt is a questionable historian. Pt reports daily \"maid\" to assist in bathing, Mod I in toileting, grooming and dressing. Wife and \"maid\" assist in IADLs; completed functional mobility with WW.      Pain Level: marked pain in R UE with any touch/movement.      Cognition: Pt alert and conversing but confused requiring max cues for safey                Functional Assessment:    Initial Eval Status  Date: 21 Treatment session: 21 Short Term Goals      Feeding Set up       Grooming SBA  Standing sink level for hand hygiene     Max A     Mod I   UB Dressing Min A  Management of hospital gown Mod A to doff/ don gown   Mod I   LB Dressing Max A  Donning B socks seated EOB  Min A    Bathing Mod A  Min A   Toileting Min A  Use of grab bar for support in transfer  Assist minimally in posterior corina care  Mod I   Bed Mobility  Supine to sit: Min A Supine to sit: Min A     Functional Transfers STS: CGA  STS: Mod A  From EOB  SPT: Mod A  From bed to chair with ww Mod I   Functional Mobility CGA with [employer]  Household distance   Mod I during ADLs   Balance Sitting: fair plus     Standing: fair at [employer] Sitting: Min progressing to [address]  Standing:  Mod A     Activity Tolerance Fair minus Poor standing tolerance  standing bev x7-8 min with fair plus balance during self care tasks                 Treatment: Upon arrival pt was supine in bed.  Pt required cues for initiation/ completion of tasks. Assistance with AD management was provided during transfers to ensure safety. At end of session pt transferred to chair with alarm on, all lines and tubes intact and call light within reach. Education: Transfer training, AD management and benefits of OOB activity all to increase tolerance to ADLs. · Pt has made good progress towards set goals. Plan of Care: 2-5 days/week for 1-2 weeks PRN   [x]? ?? ADL retraining/adaptive techniques and AE recommendations to increase functional independence within precautions                    [x]? ? ? Energy conservation techniques to improve tolerance for ADL/IADLs  [x]? ? ? Functional transfer/mobility training/DME recommendations for increased independence, safety and fall prevention         [x]? ? ? Patient/family education to increase safety and functional independence during daily routine          [x]? ? ? Environmental modifications for safe mobility and completion of ADLs                             []? ?? Cognitive retraining to improve problem solving skills & safe participation in ADLs/IADLs     []? ? ?Sensory re-education techniques to improve extremity awareness, maintain skin integrity and improve hand function                             []? ? ? Visual/Perceptual retraining to improve body awareness and safety during transfers and ADLs  []? ?? Splinting/positioning needs to maintain joint/skin integrity and contracture prevention  [x]? ? ? Therapeutic activity to improve functional performance during ADLs                                        [x]? ? ? Therapeutic exercise to improve tolerance and functional strength for ADLs   [x]? ? ? Balance retraining/tolerance tasks for facilitation of postural control with dynamic challenges during ADLs  []? ? ?Neuromuscular re-education to facilitate righting/equilibrium reactions, midline orientation, scapular stability/mobility, normalize muscle tone and facilitate active functional

## 2021-05-24 NOTE — PROGRESS NOTES
SPEECH/LANGUAGE PATHOLOGY  CLINICAL ASSESSMENT OF SWALLOWING FUNCTION   and PLAN OF CARE    PATIENT NAME:  Greg Kebede  (male)     MRN:  [id number]    :  1935  (80 y.o.)  STATUS:  Inpatient: Room 7055/5661-L    TODAY'S DATE:  2021  REFERRING PROVIDER:   Dr David Rivera MD  SPECIFIC PROVIDER ORDER: SLP eval and treat Date of order:  21  REASON FOR REFERRAL: assess for dysphagia   EVALUATING THERAPIST: JANNETTE Romeo                 RESULTS:    DYSPHAGIA DIAGNOSIS:   Clinical indicators of  oropharyngeal phase dysphagia, however inconsistent. Recommend video swallow eval to further assess need for liquid thickener. DIET RECOMMENDATIONS: continue current diet until video swallow eval completed     FEEDING RECOMMENDATIONS:     Assistance level:  No assistance needed      Compensatory strategies recommended: Small bites/sips, Alternate solids and liquids and No straw      Discussed recommendations with nursing and/or faxed report to referring provider: Yes    SPEECH THERAPY  PLAN OF CARE   The dysphagia POC is established based on physician order, dysphagia diagnosis and results of clinical assessment     Will establish POC once MBSS is completed. Conditions Requiring Skilled Therapeutic Intervention for dysphagia:    Wet vocal quality during PO intake    Specific dysphagia interventions to include:     Compensatory strategy training   Therapeutic exercises  Trials of upgraded diet/liquid     Specific instructions for next treatment:  MBSS    Patient Treatment Goals:    Short Term Goals:  Pt will participate in MBSS to fully assess oropharyngeal swallow function and to assist in determining the least restrictive PO diet to maintain adequate nutrition/hydration     Long Term Goals: A Video Swallow Study (MBSS) is recommended and requires a physician order when medical status permits transport to xray department      Patient/family Goal:    Did not state.   Will further assess during treatment. Plan of care discussed with Patient   The Patient did not demonstrate complete understanding of the diagnosis, prognosis and plan of care     Rehabilitation Potential/Prognosis: fair                    ADMITTING DIAGNOSIS: Atrial fibrillation with RVR ([address] [I48.91]    VISIT DIAGNOSIS:      PATIENT REPORT/COMPLAINT: none    RN cleared patient for participation in assessment     yes     PRIOR LEVEL OF SWALLOW FUNCTION:    PAST HISTORY OF DYSPHAGIA?: none reported    DIET DURING HOSPITAL ADMISSION: Regular consistency solids with honey consistency (moderately thick) liquids    PROCEDURE:  Consistencies Administered During the Evaluation   Liquids: thin liquid and honey thick liquid   Solids:  pureed foods and solid foods      Method of Intake:   cup, straw, spoon  Self fed, Fed by clinician      Position:   Seated, upright    CLINICAL ASSESSMENT:  Oral Stage:       Decreased mastication due to:  poor/missing dentition   and cognitive function and Oral residuals were noted :  throughout the oral cavity      Pharyngeal Stage:    Wet/gurgly vocal quality was noted after presentation of thin liquid and honey consistency liquid inconsistently  No other signs of aspiration were noted during this evaluation however, silent aspiration cannot be ruled out at bedside. If silent aspiration is suspected, a Videofluoroscopic Study of Swallowing (MBS) is recommended and requires a physician order. Cognition:   Confusion noted and Hard of hearing    Oral Peripheral Examination   Could not test    Current Respiratory Status    room air     Parameters of Speech Production  Respiration:  Adequate for speech production  Quality:   Within functional limits  Intensity: Within functional limits    Volitional Swallow: present     Volitional Cough:   Did not follow commands to complete     Pain: No pain reported.     EDUCATION:   The Speech Language Pathologist (SLP) completed education regarding results of evaluation and that intervention is warranted at this time. Learner: Patient  Education: Reviewed results and recommendations of this evaluation  Evaluation of Education:  Needs further instruction    This plan may be re-evaluated and revised as warranted. Evaluation Time includes thorough review of current medical information, gathering information on past medical history/social history and prior level of function, completion of standardized testing/informal observation of tasks, assessment of data and education on plan of care and goals. INTERVENTION    Pt/family educated on above results and plan of care. Pt/family trained on compensatory strategies for safe swallow and signs and symptoms of aspiration (throat clearing, coughing/choking, wet vocal quality. , etc.) and encouraged to notify staff if observed. Handouts provided as warranted. Pt/family encouraged to engage in question/answer session. All questions answered and pt/family verbalized understanding of above. [x]The admitting diagnosis and active problem list, have been reviewed prior to initiation of this evaluation.         ACTIVE PROBLEM LIST:   Patient Active Problem List   Diagnosis    Acquired varus deformity knee    COPD without exacerbation ([address]    Mixed hyperlipidemia    Chronic osteomyelitis of left femur ([address]    Degenerative joint disease of left knee    Acquired hypothyroidism    Primary osteoarthritis involving multiple joints    Anxiety    Vitamin D deficiency    Pulmonary emphysema ([address]    H/O calcium pyrophosphate deposition disease (CPPD)    Unexplained weight loss    Mild intermittent asthma without complication    Elevated liver function tests    Hyponatremia    Unable to walk    Generalized weakness    Low vitamin B12 level    Recurrent falls    Essential hypertension    Aortic valve stenosis    Atrial fibrillation with RVR (HCC)    Acute on chronic diastolic congestive heart failure ([address]         CPT code: 66836  bedside swallow eval, 00701 dysphagia    Christ PINEDO. CCC/SLP [id number]  Speech-Language Pathologist

## 2021-05-25 NOTE — TELEPHONE ENCOUNTER
Called patient to schedule 1 week follow-up per DR. Mckeon. Patients wife stated he is in rehab and not doing well. She will call us when he is better and able to get him here.

## 2021-06-13 NOTE — ED NOTES
[facility] contacted and spoke to [place]. Patient placed on hold for donation at this time. Reference # [id number].      [address] [address], RN  06/13/21 9432

## 2021-06-13 NOTE — ED NOTES
Still  Waiting for Dr. Piotr Gonzalez to call back to sign death certificate.       Stan Moore RN  06/13/21 [id number]

## 2021-06-13 NOTE — ED NOTES
Yumiko Rosenbaum (spouse) [phone]    Ivette Michele (son) [address] [facility], RN  06/13/21 1353

## 2021-06-13 NOTE — ED PROVIDER NOTES
HPI:  6/13/21, Time: 4:35 AM EDT         Bernard Berger is a 80 y.o. male presenting to the ED for cardiac arrest, beginning over an hour ago. The complaint has been persistent, severe in severity, and worsened by nothing. Patient presenting here because of cardiac arrest.  Patient was found asystolic per EMS. Patient had return of pulses around 4:00 am.  Patient did lose pulses around 4:09 AM.  Patient arrived here pulseless and was in V. fib. Patient was intubated by EMS. Patient unresponsive pale and cool to touch pupils are nonreactive    ROS:   Pertinent positives and negatives are stated within HPI, all other systems reviewed and are negative.  --------------------------------------------- PAST HISTORY ---------------------------------------------  Past Medical History:  has a past medical history of Acute on chronic diastolic congestive heart failure ([address] Anxiety, Asthma, Bladder cancer ([address] Chronic sinusitis, Closed displaced midcervical fracture of left femur ([address] Complication of internal orthopedic device, initial encounter ([address] Constipation, chronic, COPD, Degenerative joint disease of left knee, Depression, Gastritis, Hearing loss, Hyperlipidemia, Hypertension, Hypothyroidism, Iron deficiency anemia, Left leg pain, Prediabetes, Type 2 diabetes mellitus without complication ([address] and Vitamin D deficiency. Past Surgical History:  has a past surgical history that includes Upper gastrointestinal endoscopy; Colonoscopy; Femur fracture surgery (04/12/2010); fracture surgery; and pr partial hip replacement (Left, 5/14/2018). Social History:  reports that he quit smoking about 41 years ago. His smoking use included cigars. He quit after 20.00 years of use. He has never used smokeless tobacco. He reports current alcohol use of about 7.0 standard drinks of alcohol per week. He reports that he does not use drugs. Family History: family history includes Arthritis in his mother and son;  Cancer in his mother; Coronary Art Dis in his father; Diabetes in an other family member; Emphysema in his father; Heart Disease in his father. The patients home medications have been reviewed. Allergies: Cephalexin and Sulfa antibiotics    ---------------------------------------------------PHYSICAL EXAM--------------------------------------    Constitutional/General: Unresponsive  Head: Normocephalic and atraumatic  Eyes: Pupils nonreactive  Mouth: ET tube in place  Neck: Supple, full ROM, non tender to palpation in the midline, no stridor, no crepitus, no meningeal signs  Pulmonary: Lungs breath sounds noted with bagging  Cardiovascular: No heart sounds no pulses  Chest: no chest wall tenderness  Abdomen: Soft. Non tender. Non distended. +BS. No rebound, guarding, or rigidity. No pulsatile masses appreciated. Musculoskeletal: No upper or lower extremity movement  Skin: warm and dry. No rashes. Neurologic: Unresponsive no movement    -------------------------------------------------- RESULTS -------------------------------------------------  I have personally reviewed all laboratory and imaging results for this patient. Results are listed below. LABS:  No results found for this visit on 06/13/21. RADIOLOGY:  Interpreted by Radiologist.  No orders to display         ------------------------- NURSING NOTES AND VITALS REVIEWED ---------------------------   The nursing notes within the ED encounter and vital signs as below have been reviewed by myself. There were no vitals taken for this visit. Oxygen Saturation Interpretation: Normal    The patients available past medical records and past encounters were reviewed.         ------------------------------ ED COURSE/MEDICAL DECISION MAKING----------------------  Medications   lidocaine (cardiac) (XYLOCAINE) injection (100 mg Intraosseous Given 6/13/21 0426)   EPINEPHrine 1 MG/10ML injection (1 mg Intraosseous Given 6/13/21 0428)             Medical Decision Making:      Arrived here in cardiac arrest.  Patient was found asystolic around little after 3:30 AM.  Patient did have return of pulses around 4:00 but then lost pulses again around 409. Patient arrived here with no pulse. Patient was unresponsive pupils are nonreactive. Patient was in V. fib patient was shocked several times here and CPR continued. Patient went into asystole he was pronounced around 4:30 AM  Re-Evaluations:             Re-evaluation. Patients symptoms show no change  Patient arrived here and was unresponsive. Patient had no pulse he was in V. fib. Patient had CPR continued here in the emergency department. Patient was given lidocaine as well as epinephrine and was shocked several times. Patient went into asystole. Patient had been worked on for at least over an hour. Patient was pronounced at 4:30 AM.    Consultations:             Call will be placed to his PCP    Critical Care: This patient's ED course included: a personal history and physicial eaxmination    This patient has been closely monitored during their ED course. Counseling: The emergency provider has spoken with the  and discussed todays results, in addition to providing specific details for the plan of care and counseling regarding the diagnosis and prognosis. Questions are answered at this time and they are agreeable with the plan.       --------------------------------- IMPRESSION AND DISPOSITION ---------------------------------    IMPRESSION  1. Cardiac arrest ([facility]        DISPOSITION  Disposition: Other Disposition:           NOTE: This report was transcribed using voice recognition software.  Every effort was made to ensure accuracy; however, inadvertent computerized transcription errors may be present          Shantel Gao MD  21 [phone] [address] [place], [place]  21 [phone]

## 2021-06-13 NOTE — CODE DOCUMENTATION
Compressions held, v fib on monitor, pt rcvd 360j shock  Pt asystole on monitor  TOD 0430  Total 50cc NS infused

## 2021-06-13 NOTE — ED NOTES
Family chose [place]  home in [facility].       [address] [facility], RN  21 [phone]

## 2021-06-13 NOTE — CODE DOCUMENTATION
Pt arrived, compressions occurring by EMS. Pt rcvd 360j shock by EMS prior to transfer to ED card for v fib.  0424 pt transferred to ED cart and compressions resumed.

## (undated) DEVICE — DRESSING,GAUZE,XEROFORM,CURAD,5"X9",ST: Brand: CURAD

## (undated) DEVICE — Device

## (undated) DEVICE — TOTAL HIP PK

## (undated) DEVICE — TOWEL,OR,DSP,ST,BLUE,DLX,10/PK,8PK/CS: Brand: MEDLINE

## (undated) DEVICE — GAUZE,SPONGE,AVANT,4"X4",4PLY,STRL,10/TR: Brand: MEDLINE

## (undated) DEVICE — STAPLER SKIN L39MM DIA0.53MM CRWN 5.7MM S STL FIX HD PROX

## (undated) DEVICE — SET ORTHO CENTRAX CUPS

## (undated) DEVICE — DISCONTINUED USE 413460 PILLOW ABDUCTION HIP 22X15X6IN MED

## (undated) DEVICE — ELASTIC BANDAGE: Brand: DEROYAL

## (undated) DEVICE — SOLUTION IV IRRIG WATER 1000ML POUR BRL 2F7114

## (undated) DEVICE — WAX SURG 2.5GM HEMSTAT BNE BEESWAX PARAFFIN ISO PALMITATE

## (undated) DEVICE — SET ORTHO STD STORTSTD2

## (undated) DEVICE — 3M™ IOBAN™ 2 ANTIMICROBIAL INCISE DRAPE 6650EZ: Brand: IOBAN™ 2

## (undated) DEVICE — RACK TUBE CURETTE

## (undated) DEVICE — FEMORAL CANAL TIP, IRRIGATION/SUCTION

## (undated) DEVICE — DRILL SYSTEM 7

## (undated) DEVICE — GOWN,BREATHABLE SLV,AURORA,XLG,STRL: Brand: MEDLINE

## (undated) DEVICE — SET ORTHO ACCOLADE HEMI HIP BROACH

## (undated) DEVICE — 1.5L THIN WALL CAN: Brand: CRD

## (undated) DEVICE — GLOVE ORANGE PI 7 1/2   MSG9075

## (undated) DEVICE — SURGICAL PROCEDURE PACK BASIC

## (undated) DEVICE — TRAP,MUCUS SPECIMEN,40CC: Brand: MEDLINE

## (undated) DEVICE — SET STRYKER GLUE GUN

## (undated) DEVICE — SET LAMBOTTE

## (undated) DEVICE — 2108 SERIES SAGITTAL BLADE, OFFSET (20.0 X 0.89 X 80.0MM)

## (undated) DEVICE — RETRACTOR INIT

## (undated) DEVICE — DRAPE,REIN 53X77,STERILE: Brand: MEDLINE

## (undated) DEVICE — STANDARD HYPODERMIC NEEDLE,POLYPROPYLENE HUB: Brand: MONOJECT

## (undated) DEVICE — SPONGE LAP W18XL18IN WHT COT 4 PLY FLD STRUNG RADPQ DISP ST

## (undated) DEVICE — DRESSING,GAUZE,XEROFORM,CURAD,1"X8",ST: Brand: CURAD

## (undated) DEVICE — ELECTRODE PT RET AD L9FT HI MOIST COND ADH HYDRGEL CORDED

## (undated) DEVICE — BLADE ES L6IN ELASTOMERIC COAT EXT DURABLE BEND UPTO 90DEG

## (undated) DEVICE — SOLUTION IV IRRIG POUR BRL 0.9% SODIUM CHL 2F7124

## (undated) DEVICE — SET ORTHO ACCOLADE HEMI HIP INSRT

## (undated) DEVICE — YANKAUER,BULB TIP,W/O VENT,RIGID,STERILE: Brand: MEDLINE

## (undated) DEVICE — HANDPIECE SET WITH BONE CLEANING TIP AND SUCTION TUBE: Brand: INTERPULSE

## (undated) DEVICE — DRAPE 33X23IN INCISE ANTIMICROB IOBAN 2

## (undated) DEVICE — SET ORTHO STD STORTSTD1

## (undated) DEVICE — GRADUATE

## (undated) DEVICE — EXTRAS HIP